# Patient Record
Sex: FEMALE | Race: WHITE | NOT HISPANIC OR LATINO | Employment: OTHER | URBAN - METROPOLITAN AREA
[De-identification: names, ages, dates, MRNs, and addresses within clinical notes are randomized per-mention and may not be internally consistent; named-entity substitution may affect disease eponyms.]

---

## 2020-07-04 ENCOUNTER — APPOINTMENT (OUTPATIENT)
Dept: CT IMAGING | Facility: HOSPITAL | Age: 80
End: 2020-07-04

## 2020-07-04 ENCOUNTER — HOSPITAL ENCOUNTER (INPATIENT)
Facility: HOSPITAL | Age: 80
LOS: 6 days | Discharge: NURSING, REHAB OR LONG-TERM CARE | End: 2020-07-10
Attending: INTERNAL MEDICINE | Admitting: INTERNAL MEDICINE

## 2020-07-04 DIAGNOSIS — Z78.9 IMPAIRED MOBILITY AND ADLS: ICD-10-CM

## 2020-07-04 DIAGNOSIS — Z74.09 IMPAIRED MOBILITY AND ADLS: ICD-10-CM

## 2020-07-04 DIAGNOSIS — F41.9 ANXIETY DISORDER, UNSPECIFIED TYPE: Primary | ICD-10-CM

## 2020-07-04 DIAGNOSIS — E11.40 TYPE 2 DIABETES MELLITUS WITH DIABETIC NEUROPATHY, WITH LONG-TERM CURRENT USE OF INSULIN (HCC): ICD-10-CM

## 2020-07-04 DIAGNOSIS — Z79.4 TYPE 2 DIABETES MELLITUS WITH DIABETIC NEUROPATHY, WITH LONG-TERM CURRENT USE OF INSULIN (HCC): ICD-10-CM

## 2020-07-04 PROBLEM — J96.01 ACUTE HYPOXEMIC RESPIRATORY FAILURE (HCC): Status: ACTIVE | Noted: 2020-07-04

## 2020-07-04 PROBLEM — J69.0 ASPIRATION PNEUMONIA OF BOTH LUNGS (HCC): Status: ACTIVE | Noted: 2020-07-04

## 2020-07-04 PROCEDURE — 70496 CT ANGIOGRAPHY HEAD: CPT

## 2020-07-04 PROCEDURE — 74176 CT ABD & PELVIS W/O CONTRAST: CPT

## 2020-07-04 PROCEDURE — 70498 CT ANGIOGRAPHY NECK: CPT

## 2020-07-04 PROCEDURE — 71250 CT THORAX DX C-: CPT

## 2020-07-04 PROCEDURE — 93005 ELECTROCARDIOGRAM TRACING: CPT | Performed by: INTERNAL MEDICINE

## 2020-07-04 PROCEDURE — 70450 CT HEAD/BRAIN W/O DYE: CPT

## 2020-07-04 PROCEDURE — 0042T HC CT CEREBRAL PERFUSION W/WO CONTRAST: CPT

## 2020-07-04 RX ORDER — SODIUM CHLORIDE 0.9 % (FLUSH) 0.9 %
10 SYRINGE (ML) INJECTION EVERY 12 HOURS SCHEDULED
Status: DISCONTINUED | OUTPATIENT
Start: 2020-07-05 | End: 2020-07-10 | Stop reason: HOSPADM

## 2020-07-04 RX ORDER — ASPIRIN 300 MG/1
300 SUPPOSITORY RECTAL DAILY
Status: DISCONTINUED | OUTPATIENT
Start: 2020-07-05 | End: 2020-07-07

## 2020-07-04 RX ORDER — ATORVASTATIN CALCIUM 40 MG/1
80 TABLET, FILM COATED ORAL NIGHTLY
Status: DISCONTINUED | OUTPATIENT
Start: 2020-07-05 | End: 2020-07-10 | Stop reason: HOSPADM

## 2020-07-04 RX ORDER — SODIUM CHLORIDE 0.9 % (FLUSH) 0.9 %
10 SYRINGE (ML) INJECTION AS NEEDED
Status: DISCONTINUED | OUTPATIENT
Start: 2020-07-04 | End: 2020-07-10 | Stop reason: HOSPADM

## 2020-07-04 RX ORDER — ASPIRIN 325 MG
325 TABLET ORAL DAILY
Status: DISCONTINUED | OUTPATIENT
Start: 2020-07-05 | End: 2020-07-07

## 2020-07-04 RX ADMIN — IOPAMIDOL 115 ML: 755 INJECTION, SOLUTION INTRAVENOUS at 23:50

## 2020-07-05 ENCOUNTER — APPOINTMENT (OUTPATIENT)
Dept: CARDIOLOGY | Facility: HOSPITAL | Age: 80
End: 2020-07-05

## 2020-07-05 ENCOUNTER — APPOINTMENT (OUTPATIENT)
Dept: MRI IMAGING | Facility: HOSPITAL | Age: 80
End: 2020-07-05

## 2020-07-05 PROBLEM — I25.10 CAD (CORONARY ARTERY DISEASE): Status: ACTIVE | Noted: 2020-07-05

## 2020-07-05 PROBLEM — I50.22 SYSTOLIC CHF, CHRONIC (HCC): Status: ACTIVE | Noted: 2020-07-05

## 2020-07-05 PROBLEM — E11.9 DM2 (DIABETES MELLITUS, TYPE 2) (HCC): Status: ACTIVE | Noted: 2020-07-05

## 2020-07-05 PROBLEM — N39.0 ACUTE UTI (URINARY TRACT INFECTION): Status: ACTIVE | Noted: 2020-07-05

## 2020-07-05 PROBLEM — R53.1 LEFT-SIDED WEAKNESS: Status: ACTIVE | Noted: 2020-07-05

## 2020-07-05 PROBLEM — I82.409 ACUTE DVT (DEEP VENOUS THROMBOSIS) (HCC): Status: ACTIVE | Noted: 2020-07-05

## 2020-07-05 PROBLEM — I63.9 ACUTE CVA (CEREBROVASCULAR ACCIDENT) (HCC): Status: ACTIVE | Noted: 2020-07-05

## 2020-07-05 PROBLEM — I10 HTN (HYPERTENSION): Status: ACTIVE | Noted: 2020-07-05

## 2020-07-05 LAB
ALBUMIN SERPL-MCNC: 3.2 G/DL (ref 3.5–5.2)
ALBUMIN SERPL-MCNC: 3.4 G/DL (ref 3.5–5.2)
ALBUMIN/GLOB SERPL: 1.1 G/DL
ALBUMIN/GLOB SERPL: 1.2 G/DL
ALP SERPL-CCNC: 75 U/L (ref 39–117)
ALP SERPL-CCNC: 81 U/L (ref 39–117)
ALT SERPL W P-5'-P-CCNC: 35 U/L (ref 1–33)
ALT SERPL W P-5'-P-CCNC: 37 U/L (ref 1–33)
ANION GAP SERPL CALCULATED.3IONS-SCNC: 14 MMOL/L (ref 5–15)
ANION GAP SERPL CALCULATED.3IONS-SCNC: 14 MMOL/L (ref 5–15)
APTT PPP: 31.1 SECONDS (ref 50–95)
APTT PPP: 69.7 SECONDS (ref 50–95)
AST SERPL-CCNC: 60 U/L (ref 1–32)
AST SERPL-CCNC: 61 U/L (ref 1–32)
BACTERIA UR QL AUTO: ABNORMAL /HPF
BASOPHILS # BLD AUTO: 0.03 10*3/MM3 (ref 0–0.2)
BASOPHILS # BLD AUTO: 0.03 10*3/MM3 (ref 0–0.2)
BASOPHILS NFR BLD AUTO: 0.2 % (ref 0–1.5)
BASOPHILS NFR BLD AUTO: 0.2 % (ref 0–1.5)
BH CV ECHO MEAS - AO MAX PG (FULL): 11.3 MMHG
BH CV ECHO MEAS - AO MAX PG: 15 MMHG
BH CV ECHO MEAS - AO MEAN PG (FULL): 6 MMHG
BH CV ECHO MEAS - AO MEAN PG: 8.2 MMHG
BH CV ECHO MEAS - AO ROOT AREA: 7.8 CM^2
BH CV ECHO MEAS - AO ROOT DIAM: 3.1 CM
BH CV ECHO MEAS - AO V2 MAX: 194.1 CM/SEC
BH CV ECHO MEAS - AO V2 MEAN: 131.5 CM/SEC
BH CV ECHO MEAS - AO V2 VTI: 48 CM
BH CV ECHO MEAS - ASC AORTA: 3.7 CM
BH CV ECHO MEAS - AVA(I,A): 1.6 CM^2
BH CV ECHO MEAS - AVA(I,D): 1.6 CM^2
BH CV ECHO MEAS - AVA(V,A): 1.5 CM^2
BH CV ECHO MEAS - AVA(V,D): 1.5 CM^2
BH CV ECHO MEAS - BSA(HAYCOCK): 2.3 M^2
BH CV ECHO MEAS - BSA: 2.1 M^2
BH CV ECHO MEAS - BZI_BMI: 43.6 KILOGRAMS/M^2
BH CV ECHO MEAS - BZI_METRIC_HEIGHT: 160 CM
BH CV ECHO MEAS - BZI_METRIC_WEIGHT: 111.6 KG
BH CV ECHO MEAS - EDV(CUBED): 87.1 ML
BH CV ECHO MEAS - EDV(MOD-SP2): 86.2 ML
BH CV ECHO MEAS - EDV(MOD-SP4): 111 ML
BH CV ECHO MEAS - EDV(TEICH): 89.2 ML
BH CV ECHO MEAS - EF(CUBED): 65.4 %
BH CV ECHO MEAS - EF(MOD-SP2): 51.6 %
BH CV ECHO MEAS - EF(MOD-SP4): 54.3 %
BH CV ECHO MEAS - EF(TEICH): 57.1 %
BH CV ECHO MEAS - EF{MOD-BP}: 54.1 %
BH CV ECHO MEAS - ESV(CUBED): 30.1 ML
BH CV ECHO MEAS - ESV(MOD-SP2): 41.7 ML
BH CV ECHO MEAS - ESV(MOD-SP4): 50.7 ML
BH CV ECHO MEAS - ESV(TEICH): 38.3 ML
BH CV ECHO MEAS - FS: 29.8 %
BH CV ECHO MEAS - IVS/LVPW: 0.92
BH CV ECHO MEAS - IVSD: 0.96 CM
BH CV ECHO MEAS - LA DIMENSION: 3.2 CM
BH CV ECHO MEAS - LA/AO: 1
BH CV ECHO MEAS - LAD MAJOR: 6.3 CM
BH CV ECHO MEAS - LV MASS(C)D: 148.9 GRAMS
BH CV ECHO MEAS - LV MAX PG: 3.7 MMHG
BH CV ECHO MEAS - LV MEAN PG: 2.2 MMHG
BH CV ECHO MEAS - LV V1 MAX: 96 CM/SEC
BH CV ECHO MEAS - LV V1 MEAN: 65.3 CM/SEC
BH CV ECHO MEAS - LV V1 VTI: 25 CM
BH CV ECHO MEAS - LVIDD: 4.4 CM
BH CV ECHO MEAS - LVIDS: 3.1 CM
BH CV ECHO MEAS - LVLD AP2: 7.5 CM
BH CV ECHO MEAS - LVLD AP4: 7.3 CM
BH CV ECHO MEAS - LVLS AP2: 5.7 CM
BH CV ECHO MEAS - LVLS AP4: 6.1 CM
BH CV ECHO MEAS - LVOT AREA (M): 3.1 CM^2
BH CV ECHO MEAS - LVOT AREA: 3 CM^2
BH CV ECHO MEAS - LVOT DIAM: 2 CM
BH CV ECHO MEAS - LVPWD: 1 CM
BH CV ECHO MEAS - MV A MAX VEL: 98.5 CM/SEC
BH CV ECHO MEAS - MV DEC SLOPE: 300.6 CM/SEC^2
BH CV ECHO MEAS - MV DEC TIME: 0.55 SEC
BH CV ECHO MEAS - MV E MAX VEL: 108 CM/SEC
BH CV ECHO MEAS - MV E/A: 1.1
BH CV ECHO MEAS - PA ACC TIME: 0.18 SEC
BH CV ECHO MEAS - PA MAX PG: 2.6 MMHG
BH CV ECHO MEAS - PA PR(ACCEL): -1 MMHG
BH CV ECHO MEAS - PA V2 MAX: 79.5 CM/SEC
BH CV ECHO MEAS - RAP SYSTOLE: 8 MMHG
BH CV ECHO MEAS - RVSP: 43 MMHG
BH CV ECHO MEAS - SV(AO): 373.8 ML
BH CV ECHO MEAS - SV(CUBED): 57 ML
BH CV ECHO MEAS - SV(LVOT): 76.2 ML
BH CV ECHO MEAS - SV(MOD-SP2): 44.5 ML
BH CV ECHO MEAS - SV(MOD-SP4): 60.3 ML
BH CV ECHO MEAS - SV(TEICH): 50.9 ML
BH CV ECHO MEAS - TAPSE (>1.6): 1.89 CM2
BH CV ECHO MEAS - TR MAX PG: 35 MMHG
BH CV ECHO MEAS - TR MAX VEL: 396 CM/SEC
BH CV ECHO MEAS - TV E MAX VEL: 55.3 CM/SEC
BH CV LOW VAS LEFT COMMON FEMORAL SPONT: 1
BH CV LOW VAS LEFT DISTAL FEMORAL SPONT: 1
BH CV LOW VAS LEFT GASTRONEMIUS VESSEL: 1
BH CV LOW VAS LEFT MID FEMORAL SPONT: 1
BH CV LOW VAS LEFT PERONEAL VESSEL: 1
BH CV LOW VAS LEFT POPLITEAL SPONT: 1
BH CV LOW VAS LEFT POSTERIOR TIBIAL VESSEL: 1
BH CV LOW VAS LEFT PROXIMAL FEMORAL SPONT: 1
BH CV LOWER VASCULAR LEFT COMMON FEMORAL AUGMENT: NORMAL
BH CV LOWER VASCULAR LEFT COMMON FEMORAL COMPRESS: NORMAL
BH CV LOWER VASCULAR LEFT COMMON FEMORAL PHASIC: NORMAL
BH CV LOWER VASCULAR LEFT COMMON FEMORAL SPONT: NORMAL
BH CV LOWER VASCULAR LEFT COMMON FEMORAL THROMBUS: NORMAL
BH CV LOWER VASCULAR LEFT DISTAL FEMORAL AUGMENT: NORMAL
BH CV LOWER VASCULAR LEFT DISTAL FEMORAL COMPRESS: NORMAL
BH CV LOWER VASCULAR LEFT DISTAL FEMORAL PHASIC: NORMAL
BH CV LOWER VASCULAR LEFT DISTAL FEMORAL SPONT: NORMAL
BH CV LOWER VASCULAR LEFT DISTAL FEMORAL THROMBUS: NORMAL
BH CV LOWER VASCULAR LEFT GASTRONEMIUS COMPRESS: NORMAL
BH CV LOWER VASCULAR LEFT GASTRONEMIUS THROMBUS: NORMAL
BH CV LOWER VASCULAR LEFT GREATER SAPH AK COMPRESS: NORMAL
BH CV LOWER VASCULAR LEFT GREATER SAPH BK COMPRESS: NORMAL
BH CV LOWER VASCULAR LEFT LESSER SAPH COMPRESS: NORMAL
BH CV LOWER VASCULAR LEFT MID FEMORAL AUGMENT: NORMAL
BH CV LOWER VASCULAR LEFT MID FEMORAL COMPETENT: NORMAL
BH CV LOWER VASCULAR LEFT MID FEMORAL COMPRESS: NORMAL
BH CV LOWER VASCULAR LEFT MID FEMORAL PHASIC: NORMAL
BH CV LOWER VASCULAR LEFT MID FEMORAL SPONT: NORMAL
BH CV LOWER VASCULAR LEFT MID FEMORAL THROMBUS: NORMAL
BH CV LOWER VASCULAR LEFT PERONEAL COMPRESS: NORMAL
BH CV LOWER VASCULAR LEFT PERONEAL THROMBUS: NORMAL
BH CV LOWER VASCULAR LEFT POPLITEAL AUGMENT: NORMAL
BH CV LOWER VASCULAR LEFT POPLITEAL COMPRESS: NORMAL
BH CV LOWER VASCULAR LEFT POPLITEAL PHASIC: NORMAL
BH CV LOWER VASCULAR LEFT POPLITEAL SPONT: NORMAL
BH CV LOWER VASCULAR LEFT POPLITEAL THROMBUS: NORMAL
BH CV LOWER VASCULAR LEFT POSTERIOR TIBIAL COMPRESS: NORMAL
BH CV LOWER VASCULAR LEFT POSTERIOR TIBIAL THROMBUS: NORMAL
BH CV LOWER VASCULAR LEFT PROFUNDA FEMORAL COMPRESS: NORMAL
BH CV LOWER VASCULAR LEFT PROXIMAL FEMORAL AUGMENT: NORMAL
BH CV LOWER VASCULAR LEFT PROXIMAL FEMORAL COMPRESS: NORMAL
BH CV LOWER VASCULAR LEFT PROXIMAL FEMORAL PHASIC: NORMAL
BH CV LOWER VASCULAR LEFT PROXIMAL FEMORAL SPONT: NORMAL
BH CV LOWER VASCULAR LEFT PROXIMAL FEMORAL THROMBUS: NORMAL
BH CV LOWER VASCULAR LEFT SAPHENOFEMORAL JUNCTION AUGMENT: NORMAL
BH CV LOWER VASCULAR LEFT SAPHENOFEMORAL JUNCTION COMPRESS: NORMAL
BH CV LOWER VASCULAR LEFT SAPHENOFEMORAL JUNCTION PHASIC: NORMAL
BH CV LOWER VASCULAR LEFT SAPHENOFEMORAL JUNCTION SPONT: NORMAL
BH CV LOWER VASCULAR RIGHT COMMON FEMORAL AUGMENT: NORMAL
BH CV LOWER VASCULAR RIGHT COMMON FEMORAL COMPRESS: NORMAL
BH CV LOWER VASCULAR RIGHT COMMON FEMORAL PHASIC: NORMAL
BH CV LOWER VASCULAR RIGHT COMMON FEMORAL SPONT: NORMAL
BH CV LOWER VASCULAR RIGHT DISTAL FEMORAL COMPRESS: NORMAL
BH CV LOWER VASCULAR RIGHT GASTRONEMIUS COMPRESS: NORMAL
BH CV LOWER VASCULAR RIGHT GREATER SAPH AK COMPRESS: NORMAL
BH CV LOWER VASCULAR RIGHT GREATER SAPH BK COMPRESS: NORMAL
BH CV LOWER VASCULAR RIGHT LESSER SAPH COMPRESS: NORMAL
BH CV LOWER VASCULAR RIGHT MID FEMORAL AUGMENT: NORMAL
BH CV LOWER VASCULAR RIGHT MID FEMORAL COMPRESS: NORMAL
BH CV LOWER VASCULAR RIGHT MID FEMORAL PHASIC: NORMAL
BH CV LOWER VASCULAR RIGHT MID FEMORAL SPONT: NORMAL
BH CV LOWER VASCULAR RIGHT PERONEAL COMPRESS: NORMAL
BH CV LOWER VASCULAR RIGHT POPLITEAL AUGMENT: NORMAL
BH CV LOWER VASCULAR RIGHT POPLITEAL COMPRESS: NORMAL
BH CV LOWER VASCULAR RIGHT POPLITEAL PHASIC: NORMAL
BH CV LOWER VASCULAR RIGHT POPLITEAL SPONT: NORMAL
BH CV LOWER VASCULAR RIGHT POSTERIOR TIBIAL COMPRESS: NORMAL
BH CV LOWER VASCULAR RIGHT PROFUNDA FEMORAL COMPRESS: NORMAL
BH CV LOWER VASCULAR RIGHT PROXIMAL FEMORAL COMPRESS: NORMAL
BH CV LOWER VASCULAR RIGHT SAPHENOFEMORAL JUNCTION AUGMENT: NORMAL
BH CV LOWER VASCULAR RIGHT SAPHENOFEMORAL JUNCTION COMPRESS: NORMAL
BH CV LOWER VASCULAR RIGHT SAPHENOFEMORAL JUNCTION PHASIC: NORMAL
BH CV LOWER VASCULAR RIGHT SAPHENOFEMORAL JUNCTION SPONT: NORMAL
BH CV LOWER VASCULAR RIGHT SOLEAL COMPRESS: NORMAL
BH CV XLRA - RV BASE: 3.9 CM
BH CV XLRA - RV LENGTH: 8.2 CM
BH CV XLRA - RV MID: 3.9 CM
BH CV XLRA - TDI S': 12.6 CM/SEC
BILIRUB SERPL-MCNC: 0.3 MG/DL (ref 0.2–1.2)
BILIRUB SERPL-MCNC: 0.3 MG/DL (ref 0.2–1.2)
BILIRUB UR QL STRIP: NEGATIVE
BUN SERPL-MCNC: 22 MG/DL (ref 8–23)
BUN SERPL-MCNC: 23 MG/DL (ref 8–23)
BUN/CREAT SERPL: 18.5 (ref 7–25)
BUN/CREAT SERPL: 20 (ref 7–25)
CALCIUM SPEC-SCNC: 8.8 MG/DL (ref 8.6–10.5)
CALCIUM SPEC-SCNC: 8.8 MG/DL (ref 8.6–10.5)
CHLORIDE SERPL-SCNC: 101 MMOL/L (ref 98–107)
CHLORIDE SERPL-SCNC: 99 MMOL/L (ref 98–107)
CHOLEST SERPL-MCNC: 312 MG/DL (ref 0–200)
CLARITY UR: CLEAR
CO2 SERPL-SCNC: 23 MMOL/L (ref 22–29)
CO2 SERPL-SCNC: 25 MMOL/L (ref 22–29)
COLOR UR: YELLOW
CREAT SERPL-MCNC: 1.1 MG/DL (ref 0.57–1)
CREAT SERPL-MCNC: 1.24 MG/DL (ref 0.57–1)
D DIMER PPP FEU-MCNC: 11.18 MCGFEU/ML (ref 0–0.56)
D-LACTATE SERPL-SCNC: 2.4 MMOL/L (ref 0.5–2)
D-LACTATE SERPL-SCNC: 2.4 MMOL/L (ref 0.5–2)
DEPRECATED RDW RBC AUTO: 44.1 FL (ref 37–54)
DEPRECATED RDW RBC AUTO: 45.8 FL (ref 37–54)
EOSINOPHIL # BLD AUTO: 0.01 10*3/MM3 (ref 0–0.4)
EOSINOPHIL # BLD AUTO: 0.03 10*3/MM3 (ref 0–0.4)
EOSINOPHIL NFR BLD AUTO: 0.1 % (ref 0.3–6.2)
EOSINOPHIL NFR BLD AUTO: 0.2 % (ref 0.3–6.2)
ERYTHROCYTE [DISTWIDTH] IN BLOOD BY AUTOMATED COUNT: 13 % (ref 12.3–15.4)
ERYTHROCYTE [DISTWIDTH] IN BLOOD BY AUTOMATED COUNT: 13.2 % (ref 12.3–15.4)
GFR SERPL CREATININE-BSD FRML MDRD: 42 ML/MIN/1.73
GFR SERPL CREATININE-BSD FRML MDRD: 48 ML/MIN/1.73
GLOBULIN UR ELPH-MCNC: 2.7 GM/DL
GLOBULIN UR ELPH-MCNC: 3.1 GM/DL
GLUCOSE BLDC GLUCOMTR-MCNC: 197 MG/DL (ref 70–130)
GLUCOSE BLDC GLUCOMTR-MCNC: 285 MG/DL (ref 70–130)
GLUCOSE BLDC GLUCOMTR-MCNC: 290 MG/DL (ref 70–130)
GLUCOSE BLDC GLUCOMTR-MCNC: 329 MG/DL (ref 70–130)
GLUCOSE SERPL-MCNC: 284 MG/DL (ref 65–99)
GLUCOSE SERPL-MCNC: 334 MG/DL (ref 65–99)
GLUCOSE UR STRIP-MCNC: ABNORMAL MG/DL
HBA1C MFR BLD: 8.1 % (ref 4.8–5.6)
HBA1C MFR BLD: 8.2 % (ref 4.8–5.6)
HCT VFR BLD AUTO: 35.4 % (ref 34–46.6)
HCT VFR BLD AUTO: 37.7 % (ref 34–46.6)
HDLC SERPL-MCNC: 44 MG/DL (ref 40–60)
HGB BLD-MCNC: 11.4 G/DL (ref 12–15.9)
HGB BLD-MCNC: 12.3 G/DL (ref 12–15.9)
HGB UR QL STRIP.AUTO: ABNORMAL
HYALINE CASTS UR QL AUTO: ABNORMAL /LPF
IMM GRANULOCYTES # BLD AUTO: 0.07 10*3/MM3 (ref 0–0.05)
IMM GRANULOCYTES # BLD AUTO: 0.11 10*3/MM3 (ref 0–0.05)
IMM GRANULOCYTES NFR BLD AUTO: 0.5 % (ref 0–0.5)
IMM GRANULOCYTES NFR BLD AUTO: 0.7 % (ref 0–0.5)
INR PPP: 1.12 (ref 0.85–1.16)
INR PPP: 1.16 (ref 0.85–1.16)
KETONES UR QL STRIP: NEGATIVE
LACTATE HOLD SPECIMEN: NORMAL
LDH SERPL-CCNC: 292 U/L (ref 135–214)
LDLC SERPL CALC-MCNC: 233 MG/DL (ref 0–100)
LDLC/HDLC SERPL: 5.29 {RATIO}
LEUKOCYTE ESTERASE UR QL STRIP.AUTO: ABNORMAL
LIPASE SERPL-CCNC: 18 U/L (ref 13–60)
LYMPHOCYTES # BLD AUTO: 2.12 10*3/MM3 (ref 0.7–3.1)
LYMPHOCYTES # BLD AUTO: 2.48 10*3/MM3 (ref 0.7–3.1)
LYMPHOCYTES NFR BLD AUTO: 13.7 % (ref 19.6–45.3)
LYMPHOCYTES NFR BLD AUTO: 19.3 % (ref 19.6–45.3)
MAXIMAL PREDICTED HEART RATE: 140 BPM
MCH RBC QN AUTO: 30.4 PG (ref 26.6–33)
MCH RBC QN AUTO: 30.6 PG (ref 26.6–33)
MCHC RBC AUTO-ENTMCNC: 32.2 G/DL (ref 31.5–35.7)
MCHC RBC AUTO-ENTMCNC: 32.6 G/DL (ref 31.5–35.7)
MCV RBC AUTO: 93.1 FL (ref 79–97)
MCV RBC AUTO: 94.9 FL (ref 79–97)
MONOCYTES # BLD AUTO: 0.92 10*3/MM3 (ref 0.1–0.9)
MONOCYTES # BLD AUTO: 1.1 10*3/MM3 (ref 0.1–0.9)
MONOCYTES NFR BLD AUTO: 7.1 % (ref 5–12)
MONOCYTES NFR BLD AUTO: 7.1 % (ref 5–12)
NEUTROPHILS NFR BLD AUTO: 12.04 10*3/MM3 (ref 1.7–7)
NEUTROPHILS NFR BLD AUTO: 72.8 % (ref 42.7–76)
NEUTROPHILS NFR BLD AUTO: 78.1 % (ref 42.7–76)
NEUTROPHILS NFR BLD AUTO: 9.36 10*3/MM3 (ref 1.7–7)
NITRITE UR QL STRIP: POSITIVE
NRBC BLD AUTO-RTO: 0 /100 WBC (ref 0–0.2)
NRBC BLD AUTO-RTO: 0 /100 WBC (ref 0–0.2)
PH UR STRIP.AUTO: <=5 [PH] (ref 5–8)
PLAT MORPH BLD: NORMAL
PLATELET # BLD AUTO: 138 10*3/MM3 (ref 140–450)
PLATELET # BLD AUTO: 144 10*3/MM3 (ref 140–450)
PMV BLD AUTO: 11.3 FL (ref 6–12)
PMV BLD AUTO: 11.5 FL (ref 6–12)
POTASSIUM SERPL-SCNC: 3.2 MMOL/L (ref 3.5–5.2)
POTASSIUM SERPL-SCNC: 3.5 MMOL/L (ref 3.5–5.2)
PROCALCITONIN SERPL-MCNC: 7.52 NG/ML (ref 0.1–0.25)
PROT SERPL-MCNC: 5.9 G/DL (ref 6–8.5)
PROT SERPL-MCNC: 6.5 G/DL (ref 6–8.5)
PROT UR QL STRIP: ABNORMAL
PROTHROMBIN TIME: 14.1 SECONDS (ref 11.5–14)
PROTHROMBIN TIME: 14.5 SECONDS (ref 11.5–14)
RBC # BLD AUTO: 3.73 10*6/MM3 (ref 3.77–5.28)
RBC # BLD AUTO: 4.05 10*6/MM3 (ref 3.77–5.28)
RBC # UR: ABNORMAL /HPF
RBC MORPH BLD: NORMAL
REF LAB TEST METHOD: ABNORMAL
SARS-COV-2 RNA RESP QL NAA+PROBE: NOT DETECTED
SODIUM SERPL-SCNC: 138 MMOL/L (ref 136–145)
SODIUM SERPL-SCNC: 138 MMOL/L (ref 136–145)
SP GR UR STRIP: 1.05 (ref 1–1.03)
SQUAMOUS #/AREA URNS HPF: ABNORMAL /HPF
STRESS TARGET HR: 119 BPM
TRIGL SERPL-MCNC: 176 MG/DL (ref 0–150)
TROPONIN T SERPL-MCNC: 0.41 NG/ML (ref 0–0.03)
TROPONIN T SERPL-MCNC: 0.45 NG/ML (ref 0–0.03)
TSH SERPL DL<=0.05 MIU/L-ACNC: 0.64 UIU/ML (ref 0.27–4.2)
UFH PPP CHRO-ACNC: 0.1 IU/ML (ref 0.3–0.7)
UFH PPP CHRO-ACNC: 0.26 IU/ML (ref 0.3–0.7)
UFH PPP CHRO-ACNC: 0.39 IU/ML (ref 0.3–0.7)
UROBILINOGEN UR QL STRIP: ABNORMAL
VLDLC SERPL-MCNC: 35.2 MG/DL
WBC # BLD AUTO: 12.87 10*3/MM3 (ref 3.4–10.8)
WBC # BLD AUTO: 15.43 10*3/MM3 (ref 3.4–10.8)
WBC MORPH BLD: NORMAL
WBC UR QL AUTO: ABNORMAL /HPF

## 2020-07-05 PROCEDURE — 80053 COMPREHEN METABOLIC PANEL: CPT | Performed by: INTERNAL MEDICINE

## 2020-07-05 PROCEDURE — 85007 BL SMEAR W/DIFF WBC COUNT: CPT | Performed by: INTERNAL MEDICINE

## 2020-07-05 PROCEDURE — 85379 FIBRIN DEGRADATION QUANT: CPT | Performed by: INTERNAL MEDICINE

## 2020-07-05 PROCEDURE — C9803 HOPD COVID-19 SPEC COLLECT: HCPCS | Performed by: INTERNAL MEDICINE

## 2020-07-05 PROCEDURE — 70551 MRI BRAIN STEM W/O DYE: CPT

## 2020-07-05 PROCEDURE — 25010000002 CEFTRIAXONE: Performed by: INTERNAL MEDICINE

## 2020-07-05 PROCEDURE — 85025 COMPLETE CBC W/AUTO DIFF WBC: CPT | Performed by: INTERNAL MEDICINE

## 2020-07-05 PROCEDURE — 92523 SPEECH SOUND LANG COMPREHEN: CPT

## 2020-07-05 PROCEDURE — 99222 1ST HOSP IP/OBS MODERATE 55: CPT | Performed by: PSYCHIATRY & NEUROLOGY

## 2020-07-05 PROCEDURE — 0 IOPAMIDOL PER 1 ML: Performed by: INTERNAL MEDICINE

## 2020-07-05 PROCEDURE — 83036 HEMOGLOBIN GLYCOSYLATED A1C: CPT | Performed by: INTERNAL MEDICINE

## 2020-07-05 PROCEDURE — 80061 LIPID PANEL: CPT | Performed by: INTERNAL MEDICINE

## 2020-07-05 PROCEDURE — 85520 HEPARIN ASSAY: CPT | Performed by: INTERNAL MEDICINE

## 2020-07-05 PROCEDURE — 25010000002 HEPARIN (PORCINE) PER 1000 UNITS

## 2020-07-05 PROCEDURE — 93010 ELECTROCARDIOGRAM REPORT: CPT | Performed by: INTERNAL MEDICINE

## 2020-07-05 PROCEDURE — 85730 THROMBOPLASTIN TIME PARTIAL: CPT | Performed by: INTERNAL MEDICINE

## 2020-07-05 PROCEDURE — 82962 GLUCOSE BLOOD TEST: CPT

## 2020-07-05 PROCEDURE — 87086 URINE CULTURE/COLONY COUNT: CPT | Performed by: INTERNAL MEDICINE

## 2020-07-05 PROCEDURE — 85520 HEPARIN ASSAY: CPT

## 2020-07-05 PROCEDURE — 84443 ASSAY THYROID STIM HORMONE: CPT | Performed by: INTERNAL MEDICINE

## 2020-07-05 PROCEDURE — 93970 EXTREMITY STUDY: CPT

## 2020-07-05 PROCEDURE — 25010000002 HEPARIN (PORCINE) 25000-0.45 UT/250ML-% SOLUTION: Performed by: INTERNAL MEDICINE

## 2020-07-05 PROCEDURE — 81001 URINALYSIS AUTO W/SCOPE: CPT | Performed by: INTERNAL MEDICINE

## 2020-07-05 PROCEDURE — 84484 ASSAY OF TROPONIN QUANT: CPT | Performed by: INTERNAL MEDICINE

## 2020-07-05 PROCEDURE — 93306 TTE W/DOPPLER COMPLETE: CPT

## 2020-07-05 PROCEDURE — 83605 ASSAY OF LACTIC ACID: CPT | Performed by: INTERNAL MEDICINE

## 2020-07-05 PROCEDURE — 99223 1ST HOSP IP/OBS HIGH 75: CPT | Performed by: INTERNAL MEDICINE

## 2020-07-05 PROCEDURE — 93970 EXTREMITY STUDY: CPT | Performed by: INTERNAL MEDICINE

## 2020-07-05 PROCEDURE — 85610 PROTHROMBIN TIME: CPT | Performed by: INTERNAL MEDICINE

## 2020-07-05 PROCEDURE — 25010000002 CEFTRIAXONE PER 250 MG: Performed by: INTERNAL MEDICINE

## 2020-07-05 PROCEDURE — 83690 ASSAY OF LIPASE: CPT | Performed by: INTERNAL MEDICINE

## 2020-07-05 PROCEDURE — 83615 LACTATE (LD) (LDH) ENZYME: CPT | Performed by: INTERNAL MEDICINE

## 2020-07-05 PROCEDURE — 93306 TTE W/DOPPLER COMPLETE: CPT | Performed by: INTERNAL MEDICINE

## 2020-07-05 PROCEDURE — 87635 SARS-COV-2 COVID-19 AMP PRB: CPT | Performed by: INTERNAL MEDICINE

## 2020-07-05 PROCEDURE — 63710000001 INSULIN LISPRO (HUMAN) PER 5 UNITS: Performed by: INTERNAL MEDICINE

## 2020-07-05 PROCEDURE — 84145 PROCALCITONIN (PCT): CPT | Performed by: INTERNAL MEDICINE

## 2020-07-05 RX ORDER — ERGOCALCIFEROL 1.25 MG/1
50000 CAPSULE ORAL WEEKLY
COMMUNITY

## 2020-07-05 RX ORDER — FERROUS SULFATE 325(65) MG
325 TABLET ORAL
Status: DISCONTINUED | OUTPATIENT
Start: 2020-07-05 | End: 2020-07-10 | Stop reason: HOSPADM

## 2020-07-05 RX ORDER — ACETAMINOPHEN 650 MG/1
650 SUPPOSITORY RECTAL EVERY 4 HOURS PRN
Status: DISCONTINUED | OUTPATIENT
Start: 2020-07-05 | End: 2020-07-10 | Stop reason: HOSPADM

## 2020-07-05 RX ORDER — CYCLOBENZAPRINE HCL 10 MG
10 TABLET ORAL 3 TIMES DAILY PRN
Status: ON HOLD | COMMUNITY
End: 2020-07-06

## 2020-07-05 RX ORDER — FAMOTIDINE 20 MG/1
40 TABLET, FILM COATED ORAL DAILY
Status: DISCONTINUED | OUTPATIENT
Start: 2020-07-05 | End: 2020-07-10 | Stop reason: HOSPADM

## 2020-07-05 RX ORDER — AMITRIPTYLINE HYDROCHLORIDE 10 MG/1
10 TABLET, FILM COATED ORAL NIGHTLY
COMMUNITY

## 2020-07-05 RX ORDER — SODIUM CHLORIDE 0.9 % (FLUSH) 0.9 %
10 SYRINGE (ML) INJECTION AS NEEDED
Status: DISCONTINUED | OUTPATIENT
Start: 2020-07-05 | End: 2020-07-10 | Stop reason: HOSPADM

## 2020-07-05 RX ORDER — LEVOTHYROXINE SODIUM 0.05 MG/1
50 TABLET ORAL DAILY
COMMUNITY

## 2020-07-05 RX ORDER — NICOTINE POLACRILEX 4 MG
15 LOZENGE BUCCAL
Status: DISCONTINUED | OUTPATIENT
Start: 2020-07-05 | End: 2020-07-10 | Stop reason: HOSPADM

## 2020-07-05 RX ORDER — PRAMIPEXOLE DIHYDROCHLORIDE 0.5 MG/1
0.5 TABLET ORAL NIGHTLY
Status: ON HOLD | COMMUNITY
End: 2020-07-06

## 2020-07-05 RX ORDER — CARVEDILOL 12.5 MG/1
12.5 TABLET ORAL 2 TIMES DAILY WITH MEALS
Status: DISCONTINUED | OUTPATIENT
Start: 2020-07-05 | End: 2020-07-10 | Stop reason: HOSPADM

## 2020-07-05 RX ORDER — ONDANSETRON 4 MG/1
4 TABLET, FILM COATED ORAL EVERY 6 HOURS PRN
Status: DISCONTINUED | OUTPATIENT
Start: 2020-07-05 | End: 2020-07-10 | Stop reason: HOSPADM

## 2020-07-05 RX ORDER — PREGABALIN 100 MG/1
300 CAPSULE ORAL 2 TIMES DAILY
COMMUNITY
End: 2020-07-10 | Stop reason: HOSPADM

## 2020-07-05 RX ORDER — CARBAMAZEPINE 100 MG/1
100 TABLET, EXTENDED RELEASE ORAL NIGHTLY
COMMUNITY
End: 2020-07-10 | Stop reason: HOSPADM

## 2020-07-05 RX ORDER — IPRATROPIUM BROMIDE AND ALBUTEROL SULFATE 2.5; .5 MG/3ML; MG/3ML
3 SOLUTION RESPIRATORY (INHALATION) EVERY 4 HOURS PRN
COMMUNITY

## 2020-07-05 RX ORDER — HYDROCODONE BITARTRATE AND ACETAMINOPHEN 5; 325 MG/1; MG/1
1 TABLET ORAL EVERY 8 HOURS PRN
Status: DISCONTINUED | OUTPATIENT
Start: 2020-07-05 | End: 2020-07-06

## 2020-07-05 RX ORDER — FUROSEMIDE 40 MG/1
40 TABLET ORAL DAILY
COMMUNITY

## 2020-07-05 RX ORDER — DULOXETIN HYDROCHLORIDE 60 MG/1
60 CAPSULE, DELAYED RELEASE ORAL NIGHTLY
COMMUNITY

## 2020-07-05 RX ORDER — OMEPRAZOLE 20 MG/1
20 CAPSULE, DELAYED RELEASE ORAL DAILY
COMMUNITY

## 2020-07-05 RX ORDER — HEPARIN SODIUM 1000 [USP'U]/ML
2500 INJECTION, SOLUTION INTRAVENOUS; SUBCUTANEOUS ONCE
Status: COMPLETED | OUTPATIENT
Start: 2020-07-05 | End: 2020-07-05

## 2020-07-05 RX ORDER — HYDROCODONE BITARTRATE AND ACETAMINOPHEN 10; 325 MG/1; MG/1
1 TABLET ORAL 3 TIMES DAILY
COMMUNITY

## 2020-07-05 RX ORDER — SODIUM CHLORIDE 0.9 % (FLUSH) 0.9 %
10 SYRINGE (ML) INJECTION EVERY 12 HOURS SCHEDULED
Status: DISCONTINUED | OUTPATIENT
Start: 2020-07-05 | End: 2020-07-10 | Stop reason: HOSPADM

## 2020-07-05 RX ORDER — DOCUSATE SODIUM 100 MG/1
100 CAPSULE, LIQUID FILLED ORAL 2 TIMES DAILY PRN
Status: DISCONTINUED | OUTPATIENT
Start: 2020-07-05 | End: 2020-07-10 | Stop reason: HOSPADM

## 2020-07-05 RX ORDER — HEPARIN SODIUM 1000 [USP'U]/ML
40 INJECTION, SOLUTION INTRAVENOUS; SUBCUTANEOUS AS NEEDED
Status: DISCONTINUED | OUTPATIENT
Start: 2020-07-05 | End: 2020-07-05

## 2020-07-05 RX ORDER — CARVEDILOL 12.5 MG/1
12.5 TABLET ORAL 2 TIMES DAILY WITH MEALS
Status: ON HOLD | COMMUNITY
End: 2020-07-06

## 2020-07-05 RX ORDER — FLUTICASONE PROPIONATE 50 MCG
2 SPRAY, SUSPENSION (ML) NASAL DAILY
Status: DISCONTINUED | OUTPATIENT
Start: 2020-07-05 | End: 2020-07-10 | Stop reason: HOSPADM

## 2020-07-05 RX ORDER — ACETAMINOPHEN 325 MG/1
650 TABLET ORAL EVERY 4 HOURS PRN
Status: DISCONTINUED | OUTPATIENT
Start: 2020-07-05 | End: 2020-07-10 | Stop reason: HOSPADM

## 2020-07-05 RX ORDER — ICOSAPENT ETHYL 1000 MG/1
1 CAPSULE ORAL 2 TIMES DAILY WITH MEALS
COMMUNITY

## 2020-07-05 RX ORDER — ASPIRIN 81 MG/1
81 TABLET ORAL DAILY
COMMUNITY

## 2020-07-05 RX ORDER — DONEPEZIL HYDROCHLORIDE 10 MG/1
10 TABLET, FILM COATED ORAL NIGHTLY
COMMUNITY

## 2020-07-05 RX ORDER — ONDANSETRON 2 MG/ML
4 INJECTION INTRAMUSCULAR; INTRAVENOUS EVERY 6 HOURS PRN
Status: DISCONTINUED | OUTPATIENT
Start: 2020-07-05 | End: 2020-07-10 | Stop reason: HOSPADM

## 2020-07-05 RX ORDER — HEPARIN SODIUM 10000 [USP'U]/100ML
13 INJECTION, SOLUTION INTRAVENOUS
Status: DISCONTINUED | OUTPATIENT
Start: 2020-07-05 | End: 2020-07-06

## 2020-07-05 RX ORDER — ACETAMINOPHEN 160 MG/5ML
650 SOLUTION ORAL EVERY 4 HOURS PRN
Status: DISCONTINUED | OUTPATIENT
Start: 2020-07-05 | End: 2020-07-10 | Stop reason: HOSPADM

## 2020-07-05 RX ORDER — LEVOTHYROXINE SODIUM 0.05 MG/1
50 TABLET ORAL DAILY
Status: DISCONTINUED | OUTPATIENT
Start: 2020-07-05 | End: 2020-07-10 | Stop reason: HOSPADM

## 2020-07-05 RX ORDER — PROPRANOLOL HYDROCHLORIDE 40 MG/1
40 TABLET ORAL 2 TIMES DAILY
COMMUNITY

## 2020-07-05 RX ORDER — LOSARTAN POTASSIUM 50 MG/1
50 TABLET ORAL 2 TIMES DAILY
Status: ON HOLD | COMMUNITY
End: 2020-07-10 | Stop reason: SDUPTHER

## 2020-07-05 RX ORDER — HEPARIN SODIUM 1000 [USP'U]/ML
80 INJECTION, SOLUTION INTRAVENOUS; SUBCUTANEOUS AS NEEDED
Status: DISCONTINUED | OUTPATIENT
Start: 2020-07-05 | End: 2020-07-05

## 2020-07-05 RX ORDER — SODIUM CHLORIDE 9 MG/ML
50 INJECTION, SOLUTION INTRAVENOUS CONTINUOUS
Status: DISCONTINUED | OUTPATIENT
Start: 2020-07-05 | End: 2020-07-05

## 2020-07-05 RX ORDER — BUMETANIDE 2 MG/1
2 TABLET ORAL DAILY
COMMUNITY
End: 2020-07-10 | Stop reason: HOSPADM

## 2020-07-05 RX ORDER — IPRATROPIUM BROMIDE AND ALBUTEROL SULFATE 2.5; .5 MG/3ML; MG/3ML
3 SOLUTION RESPIRATORY (INHALATION) EVERY 4 HOURS PRN
Status: DISCONTINUED | OUTPATIENT
Start: 2020-07-05 | End: 2020-07-10 | Stop reason: HOSPADM

## 2020-07-05 RX ORDER — DEXTROSE MONOHYDRATE 25 G/50ML
25 INJECTION, SOLUTION INTRAVENOUS
Status: DISCONTINUED | OUTPATIENT
Start: 2020-07-05 | End: 2020-07-10 | Stop reason: HOSPADM

## 2020-07-05 RX ORDER — ISOSORBIDE MONONITRATE 60 MG/1
60 TABLET, EXTENDED RELEASE ORAL DAILY
COMMUNITY
End: 2020-07-10 | Stop reason: HOSPADM

## 2020-07-05 RX ORDER — FERROUS SULFATE 325(65) MG
325 TABLET ORAL
COMMUNITY

## 2020-07-05 RX ORDER — LORAZEPAM 1 MG/1
1 TABLET ORAL NIGHTLY PRN
Status: ON HOLD | COMMUNITY
End: 2020-07-10 | Stop reason: SDUPTHER

## 2020-07-05 RX ADMIN — HYDROCODONE BITARTRATE AND ACETAMINOPHEN 1 TABLET: 5; 325 TABLET ORAL at 13:30

## 2020-07-05 RX ADMIN — SODIUM CHLORIDE, PRESERVATIVE FREE 10 ML: 5 INJECTION INTRAVENOUS at 20:18

## 2020-07-05 RX ADMIN — CEFTRIAXONE SODIUM 1 G: 1 INJECTION, POWDER, FOR SOLUTION INTRAMUSCULAR; INTRAVENOUS at 20:14

## 2020-07-05 RX ADMIN — ASPIRIN 325 MG ORAL TABLET 325 MG: 325 PILL ORAL at 08:14

## 2020-07-05 RX ADMIN — LEVOTHYROXINE SODIUM 50 MCG: 50 TABLET ORAL at 06:18

## 2020-07-05 RX ADMIN — HEPARIN SODIUM 13.4 UNITS/KG/HR: 10000 INJECTION, SOLUTION INTRAVENOUS at 10:59

## 2020-07-05 RX ADMIN — SODIUM CHLORIDE 500 ML: 9 INJECTION, SOLUTION INTRAVENOUS at 03:30

## 2020-07-05 RX ADMIN — HEPARIN SODIUM 2500 UNITS: 1000 INJECTION, SOLUTION INTRAVENOUS; SUBCUTANEOUS at 19:45

## 2020-07-05 RX ADMIN — INSULIN LISPRO 4 UNITS: 100 INJECTION, SOLUTION INTRAVENOUS; SUBCUTANEOUS at 12:33

## 2020-07-05 RX ADMIN — FAMOTIDINE 40 MG: 20 TABLET, FILM COATED ORAL at 08:14

## 2020-07-05 RX ADMIN — CEFTRIAXONE SODIUM 1 G: 1 INJECTION, POWDER, FOR SOLUTION INTRAMUSCULAR; INTRAVENOUS at 03:30

## 2020-07-05 RX ADMIN — HYDROCODONE BITARTRATE AND ACETAMINOPHEN 1 TABLET: 5; 325 TABLET ORAL at 05:38

## 2020-07-05 RX ADMIN — SODIUM CHLORIDE 50 ML/HR: 9 INJECTION, SOLUTION INTRAVENOUS at 06:14

## 2020-07-05 RX ADMIN — INSULIN LISPRO 4 UNITS: 100 INJECTION, SOLUTION INTRAVENOUS; SUBCUTANEOUS at 08:17

## 2020-07-05 RX ADMIN — ATORVASTATIN CALCIUM 80 MG: 40 TABLET, FILM COATED ORAL at 00:59

## 2020-07-05 RX ADMIN — ATORVASTATIN CALCIUM 80 MG: 40 TABLET, FILM COATED ORAL at 20:14

## 2020-07-05 RX ADMIN — INSULIN LISPRO 5 UNITS: 100 INJECTION, SOLUTION INTRAVENOUS; SUBCUTANEOUS at 03:30

## 2020-07-05 RX ADMIN — ACETAMINOPHEN 650 MG: 650 SUPPOSITORY RECTAL at 00:58

## 2020-07-05 RX ADMIN — FERROUS SULFATE TAB 325 MG (65 MG ELEMENTAL FE) 325 MG: 325 (65 FE) TAB at 08:14

## 2020-07-05 RX ADMIN — HYDROCODONE BITARTRATE AND ACETAMINOPHEN 1 TABLET: 5; 325 TABLET ORAL at 20:28

## 2020-07-05 NOTE — NURSING NOTE
ACC REVIEW REPORT: [facility]        PATIENT NAME: Steffen Zeng    PATIENT ID: [id number]      COVID-19 ACC SCREENING       DOES THE PATIENT HAVE A FEVER GREATER THAN OR EQUAL .4: no    IS THE PATIENT EXPERIENCING SHORTNESS OF BREATH: no    DOES THE PATIENT HAVE A COUGH: no    DOES THE PATIENT HAVE ANY OF THE FOLLOWING RISK FACTORS:    EXPOSURE TO SUSPECTED OR KNOWN COVID-19: no    RECENT TRAVEL HISTORY TO ENDEMIC AREA (DOMESTIC/LOCAL): no    IS THE PATIENT A HEALTHCARE WORKER: no    HAS THE PATIENT BEEN TESTED FOR COVID-19: yes    DATE TESTED: 7/4/2020    LAB TESTING SENT TO: in house and it was negative      BED: S321    BED TYPE: TELEMETRY    BED GIVEN TO: LAILA ISAACS RN    TIME BED GIVEN: 2234    YOB: 1940    AGE: 79    GENDER: FEMALE    PREVIOUS ADMIT TO [facility]: NO    PREVIOUS ADMISSION DATE: N/A    PATIENT CLASS: OBSERVATION    TODAY'S DATE: 7/4/2020    TRANSFER DATE: 7/4/2020    ETA: 2310    TRANSFERRING FACILITY: [facility]    TRANSFERRING FACILITY PHONE # : [phone]    TRANSFERRING MD: ALICIA    ACCEPTING PROVIDER: NAVIGATOR    NEUROLOGY PHYSICIAN: YULISA    DATE/TIME REQUEST RECEIVED: 7/4/2020 2106    [facility] RN: EULALIA CISNEROS RN    REPORT FROM: LAILA ISAACS RN    TIME REPORT TAKEN: 2230    DIAGNOSIS: CVA    REASON FOR TRANSFER TO [facility]: HIGHER LEVEL OF CARE    TRANSPORTATION: HELICOPTER    CLINICAL REASON FOR TRANSFER TO [facility]: LAST KNOWN WELL 830 AM DAUGHTER HAD FOUND HER SLUMPED OVER ON TOILET AROUND 5PM  SHE ARRIVED AT ER 1927.      CLINICAL INFORMATION    HEIGHT: 62 INCHES    WEIGHT: 252.4 LBS    ALLERGIES: SULFA, CODEINE    WYATT: NO    INFECTIOUS DISEASE: NO    ISOLATION: NO    LAST VITAL SIGNS:  TIME: 2230  TEMP: 99.8  PULSE: 98  B/P: 115/59  RESP: 18    LAB INFORMATION: wbc 13.94, PTT 22.2, BLOOD GLUCOSE 335, TROPONIN 0.48    MEDS/IV FLUIDS: #22 LH, #20 RAC 1 GM ROCEPHIN, 325 MG ASA RECTALLY, ERYTHROMYCIN 500 MG IV, HEPARIN 5000 UNIT BOLUS AND 12/KG/HR,  NS@500ML/HR      CARDIAC SYSTEM:    CHEST PAIN: NO    RATE:0    SCALE: 1/10    RHYTHM: SINUS TACH 1 MM DEPRESSION IN V5 V6    Is patient taking or has patient been given any drugs that could increase bleeding? YES  (Plavix, Brilinta, Effient, Eliquis, Xarelto, Warfarin, Integrilin, Angiomax)    DRUG: PLAVIX     DOSE/FREQUENCY: 75 MG DAILY      RESPIRATORY SYSTEM:    LUNG SOUNDS:    CLEAR: Y  CRACKLES: N  WHEEZES: N  RHONCHI: N  DIMINISHED: N    OXYGEN: YES    O2 SAT: 98    ADMINISTRATION ROUTE: 40% VENTI MASK.  SHE WAS 96% ON ROOM AIR BUT THEY SAID SHE WAS MOUTH BREATHING SO THEY PUT HER ON THE MASK    IMAGING FINDINGS: CHEST XRAY WAS NOT DONE THEY SAID THERE WASN'T TIME      CNS/MUSCULOSKELETAL      DAISY COMA SCALE:    E: 4  M: 6  V: 5    LAST KNOWN WELL: 830 AM          NIHSS    Survey Item  0: Means Alert  1: Drowsy or Answer Correctly  2: Incorrect, Forced, Can't Resist Gravity  3: Complete or No Effort  4: No Movement  NT: Not Testable Acceptable As Noted Above      1A: Level of Consciousness: 0    1B: LOC Questions (month, age) : 0    1C: LOC Commands (open/close eyes, make a fist & let go): 0    2:  Best Gaze (eyes open-pt follows examiner's fingers or face): 0    3:  Visual (introduce visual stimulus/threat to pt's visual field quad. Cover 1 eye and hold up fingers in all 4 quadrants) : 0    4.  Facial Palsy (show teeth, raise eyebrows and squeeze eyes tightly shut): 2    5A: Motor Arm-Left (elevate extremity to 90 degrees and score drift/movement.  Count to 10 aloud and use fingers for visual cue): 1    5B:  Motor Arm-Right (elevate extremity to 90 degrees and score drift/movement.  Count to 10 aloud and use fingers for visual cue): 0    6A:  Motor Leg-Left (elevate extremity to 30 degrees and score drift/movement.  Count to 5 out loud and use fingers for visual cue): 2    6B:  Motor Leg-Right (elevate extremity to 30 degrees and score drift/movement.  Count to 5 out loud and use fingers for visual cue):  2    7:  Limb Ataxia- finger to nose, heel down shin: 0    8:  Sensory- pin prick to face, arms, trunk, and legs. Compare sharpness side to side: 0    9:  Best Language- name, items, describe picture, and read sentences.  Do not forget glasses if they normally wear them: 0    10: Dysarthria- elevate speech clarity by pt reading or repeating words on a list: 2    11: Extinction and Inattention- Use information from prior testing or double simultaneous stimuli testing to identify neglect. Face, arms, legs and visual field: 0    Total NIHSS Score: 9-11  Date: 4/4/2020  Time of NIHSS Assessment: 2000        SIZE OF HEMORRHAGE: N/A    CAT SCAN RESULTS: NO ACUTE FINDINGS    MRI RESULTS: N/A    CNS/MUSCULOSKELETAL NOTES: NO      GI//GY      ABDOMINAL PAIN: NO    VOMITING: NO    DIARRHEA: NO    NAUSEA: NO    BOWEL SOUNDS: ACTIVE    OCCULT STOOL: UNKNOWN    VAGINAL BLEEDING: NO    TESTICULAR PAIN: N/A    HEMATURIA: NO     PAST MEDICAL HISTORY: DM, CHF, MI 2016, COPD, CAD, HTN    ADDITIONAL NOTES: NO URINE STUDIES ARE DONE.          Mary Hernandez RN  7/4/2020  21:40

## 2020-07-05 NOTE — PROGRESS NOTES
[facility] Medicine Services  ADMISSION FOLLOW-UP NOTE          Patient admitted after midnight, H&P by my partner performed earlier on today's date reviewed.  Interim findings, labs, and charting also reviewed.        The [facility] Hospital Problem List has been managed and updated to include any new diagnoses:  Active Hospital Problems    Diagnosis  POA   • **Left-sided weakness [R53.1]  Yes   • HTN (hypertension) [I10]  Yes   • CAD (coronary artery disease) [I25.10]  Yes   • Systolic CHF, chronic (CMS/HCC) [I50.22]  Yes   • DM2 (diabetes mellitus, type 2) (CMS/[facility]) [E11.9]  Yes   • Acute UTI (urinary tract infection) [N39.0]  Yes   • Acute hypoxemic respiratory failure (CMS/[facility]) [J96.01]  Yes      Resolved Hospital Problems   No resolved problems to display.         ADDITIONAL PLAN:  - detailed assessment and plan from admission reviewed  -Awaiting MRI of the brain.  Ceftriaxone treatment for urinary tract infection.  Continue treatment per protocol.  Neurology recommendations appreciated.  Supportive care and symptom treatment.  Repeat laboratory studies tomorrow.      Electronically signed by Karthik Vazquez MD, 07/05/20, 8:24 AM.

## 2020-07-05 NOTE — NURSING NOTE
WOC nurse consulted for Stage 2 pressure injury on right gluteus and left ankle; skin tear right lower leg. Pt having venous doppler and Echo at this time. Skin orders place, including Q2H turning, off loading heel boots. WOC nurse will f/u at later time.

## 2020-07-05 NOTE — PLAN OF CARE
Problem: Patient Care Overview  Goal: Plan of Care Review  Outcome: Outcome(s) achieved  Flowsheets (Taken 7/5/2020 1116)  Plan of Care Reviewed With: patient  Note:   SLP evaluation completed. Will sign-off for speech, language and cognition at this time . Please see note for further details and recommendations.

## 2020-07-05 NOTE — THERAPY DISCHARGE NOTE
"Acute Care - Speech Language Pathology Initial Eval/Discharge  [facility]     Patient Name: Steffen Zeng  : 1940  MRN: [id number]  Today's Date: 2020               Admit Date: 2020     Visit Dx:  No diagnosis found.  Patient Active Problem List   Diagnosis   • Acute hypoxemic respiratory failure (CMS/HCC)   • HTN (hypertension)   • CAD (coronary artery disease)   • Systolic CHF, chronic (CMS/HCC)   • DM2 (diabetes mellitus, type 2) (CMS/HCC)   • Left-sided weakness   • Acute UTI (urinary tract infection)     Past Medical History:   Diagnosis Date   • CHF (congestive heart failure) (CMS/HCC)    • Coronary artery disease    • Diabetes mellitus (CMS/HCC)    • Disease of thyroid gland    • Elevated cholesterol    • Hypertension    • Stroke (CMS/HCC)      Past Surgical History:   Procedure Laterality Date   • APPENDECTOMY     • CARDIAC CATHETERIZATION     •  SECTION     • CHOLECYSTECTOMY     • HYSTERECTOMY     • KNEE ARTHROSCOPY Left           SLP EVALUATION (last 72 hours)      SLP SLC Evaluation     Row Name 20 1030                   Communication Assessment/Intervention    Document Type  evaluation  -HG        Subjective Information  no complaints  -HG        Patient Observations  alert;cooperative;agree to therapy  -HG        Patient/Family Observations  Pt alert, in bed and stated, \"I have a bad memory.\"  -HG        Patient Effort  good  -HG        Symptoms Noted During/After Treatment  none  -HG           General Information    Patient Profile Reviewed  yes  -HG        Pertinent History Of Current Problem  Pt is an 79 year old female admitted with left sided weakness via stroke pathway.  Pt with dysarthria, left upper/lower extremity weakness, facial droop. Cardiac hx.   -HG        Precautions/Limitations, Vision  WFL with corrective lenses;other (see comments) glasses not present  -HG        Precautions/Limitations, Hearing  hearing impairment, bilaterally;other (see comments) not " present for eval.  -HG        Patient Level of Education  8th grade  -HG        Prior Level of Function-Communication  other (see comments) unknown- no family present  -        Plans/Goals Discussed with  patient  -HG        Barriers to Rehab  none identified  -           Pain Scale: Numbers Pre/Post-Treatment    Pre/Post Treatment Pain Comment  chronic back pain  -HG           Auditory Comprehension Assessment/Intervention    Auditory Comprehension (Communication)  WFL  -HG        Answers Questions (Communication)  mulit-unit;[employer]  -HG        Able to Follow Commands (Communication)  3-step;[employer]  -HG        Narrative Discourse  simple paragraph level;L  -HG           Reading Comprehension Assessment/Intervention    Visual Matching Ability (Reading)  picture/word;L  -HG           Verbal Expression Assessment/Intervention    Verbal Expression  WFL  -        Repetition  sentences;[employer]  -        Phrase Completion  WFL  -        Responsive Naming  WFL  -        Confrontational Naming  WFL  -        Spontaneous/Functional Words  WFL  -        Sentence Formulation  WFL  -        Conversational Discourse/Fluency  WF  -           Graphic Expression Assessment/Intervention    Graphic Expression  WFL  -        Biographical Information  name;address;[employer]  -           Oral Motor Structure and Function    Oral Motor Structure and Function  WFL  -HG        Dentition Assessment  natural, present and adequate  -        Mucosal Quality  moist, healthy  -           Oral Musculature and Cranial Nerve Assessment    Oral Motor General Assessment  WFL  -HG           Motor Speech Assessment/Intervention    Motor Speech Function  WFL  -HG           Cursory Voice Assessment/Intervention    Quality and Resonance (Voice)  WF  -           Cognitive Assessment Intervention- SLP    Orientation Status (Cognition)  awareness of basic personal information;person;place;time;situation;WFL  -        Memory (Cognitive)   simple;immediate;short-term;long-term;delayed;related;[facility]        Attention (Cognitive)  sustained;alternating;[facility]        Thought Organization (Cognitive)  concrete divergent;concrete convergent;mild impairment  -        Reasoning (Cognitive)  mental flexibility;[facility]        Problem Solving (Cognitive)  simple;[facility]        Pragmatics (Communication)  [facility]  -           SLP Clinical Impressions    SLP Diagnosis  Fxnl speech, language and cog skills at this time. Pt with reported memory deficits pre-existing before admitting symptoms started.   -           Recommendations    Therapy Frequency (SLP SLC)  evaluation only  -           SLP Discharge Summary    Discharge Destination  unknown  -          User Key  (r) = Recorded By, (t) = Taken By, (c) = Cosigned By    Initials Name Effective Dates    Cari Baer MS CCC-SLP 06/22/15 -            EDUCATION  The patient has been educated in the following areas:   Cognitive Impairment Communication Impairment.      SLP Recommendation and Plan  SLP Diagnosis: Fxnl speech, language and cog skills at this time. Pt with reported memory deficits pre-existing before admitting symptoms started.                    Plan of Care Reviewed With: patient              Time Calculation:   Time Calculation- SLP     Row Name 07/05/20 1120             Time Calculation- SLP    SLP Start Time  1030  -      SLP Received On  07/05/20  -        User Key  (r) = Recorded By, (t) = Taken By, (c) = Cosigned By    Initials Name Provider Type    Cari Baer MS CCC-SLP Speech and Language Pathologist          Therapy Charges for Today     Code Description Service Date Service Provider Modifiers Qty    89712000281 HC ST EVAL SPEECH AND PROD W LANG  4 7/5/2020 Cari Aly MS CCC-SLP GN 1                   SLP Discharge Summary  Discharge Destination: unknown    MS GINA Reed  7/5/2020

## 2020-07-05 NOTE — PROGRESS NOTES
Prelim LE US positive for DVT, heparin drip for now and monitor, consider NOAC later depending on findings and plan

## 2020-07-05 NOTE — CONSULTS
Stroke Consult Note    Patient Name: Steffen Zeng   MRN: [id number]  Age: 79 y.o.  Sex: female  : 1940    Primary Care Physician: Provider, No Known  Referring Physician:  Stacey Sommer MD    TIME STROKE TEAM CALLED:  EST     TIME PATIENT SEEN:  EST    Handedness: Unknown  Race:     Chief Complaint/Reason for Consultation: Altered mental status, LUE weakness,BLE weakness, slurred speech    Subjective .  HPI: Steffen Zeng is an 79 yr old female with a PMH significant for HTN, T2DM, CHF, MI (), CAD, COPD, and stroke (per family she received TPA 1 yr ago), that presents to [facility] via [facility] from [facility] for further evaluation of stroke symptoms. She is on Plavix daily.  Per the OSH, the patient's LKW was 0830 2020. Her daughter tried to call her at 1650 and was unable to reach her. She went to her home @ 1700 and found her sitting on the toilet slumped to the left side and unresponsive. EMS was called and the patient was transported to the OSH. Upon arrival there, she was noted to respond to voice and follow simple commands. BLE weakness, diaphoresis, and slurred speech noted as well. Her initial FSBS was 370. GCS 13. NIH fluctuating from 9-11 per ED physician. The ED physician denied any SOA, fever, cough, or N/V/D. He stated the patient was on room air and normotensive. CTH @ OSH was negative for any acute processes. Rapid COVID test @ OSH was reported as negative. 325mg ASA given per rectum.     Upon arrival to [facility], the patient was found to be on antibiotics and a heparin gtt. Per the flight crew, the patient was diagnosed with a NSTEMI @ the OSH. Troponin was 0.48. Her WBC was 13.94. In addition, she was on a 40% venti mask on arrival. She was taken to CT for a CTA/CTP. Dr. Sommer notified of changes from original report. CT chest and abdomen ordered. CT chest revealed bibasilar atelectasis. CT abdomen negative for any acute processes. CTP indicated a 10cc  area of ischemic change in the left occipital lobe without visualized core infarction. CTA H/N with no significant carotid stenosis. RPCA visualized. Proximal  LPCA as well as the proximal vertebral arteries are difficult to visualize per radiology.      Last Known Normal Date/Time: 0830 7/4/2020EST     Review of Systems   Constitutional: Positive for activity change and diaphoresis.   HENT: Negative.    Eyes: Negative.    Respiratory: Positive for shortness of breath.    Gastrointestinal: Positive for abdominal pain.   Endocrine: Negative.    Genitourinary: Negative.    Musculoskeletal: Positive for back pain.   Skin: Negative.    Allergic/Immunologic: Negative.    Neurological: Positive for speech difficulty and weakness.      Past Medical History:   Diagnosis Date   • Arthritis    • Coronary artery disease    • Elevated cholesterol    • Hypertension    • Stroke (CMS/HCC)      No past surgical history on file.  No family history on file.  Social History     Socioeconomic History   • Marital status:      Spouse name: Not on file   • Number of children: Not on file   • Years of education: Not on file   • Highest education level: Not on file   Tobacco Use   • Smoking status: Never Smoker   • Smokeless tobacco: Never Used     Not on File  Prior to Admission medications    Not on File             Objective     Temp:  [96.5 °F (35.8 °C)] 96.5 °F (35.8 °C)  Heart Rate:  [81-86] 81  Resp:  [18] 18  BP: (132)/(83) 132/83  Neurological Exam  Mental Status  Awake and alert. Oriented only to person. dysarthria present. Language is fluent with no aphasia.    Cranial Nerves  CN II: Visual fields full to confrontation.  CN III, IV, VI: Extraocular movements intact bilaterally. Extraocular movements intact bilaterally. Normal lids and orbits bilaterally. Pupils equal round and reactive to light bilaterally.  CN V: Facial sensation is normal.  CN VII:  Right: Minor facial droop.  CN VIII: Hearing is normal.  CN IX, X:  Palate elevates symmetrically  CN XI: Shoulder shrug strength is normal.  CN XII: Tongue midline without atrophy or fasciculations.    Motor    MAZARIEGOS x4. BLE strength 3/5 on arrival to [facility]. When reassessed after CT obtained, improved to 5/5. 5/5 in BUE. .    Sensory  RLE.       Physical Exam   Constitutional: She appears well-developed and well-nourished.   HENT:   Head: Normocephalic and atraumatic.   Eyes: Pupils are equal, round, and reactive to light. EOM and lids are normal.   Neck: Normal range of motion. Neck supple.   Cardiovascular: Normal rate and regular rhythm.   Pulmonary/Chest: Breath sounds normal.   SOA when lying flat   Abdominal: Soft. Bowel sounds are normal.   Musculoskeletal:   As above   Neurological: She is alert.   As above   Skin: Skin is warm and dry.       Acute Stroke Data    Alteplase (tPA) Inclusion / Exclusion Criteria    Time: 2325  Person Administering Scale: KAIA Ho    Inclusion Criteria  [x]   18 years of age or greater   []   Onset of symptoms < 4.5 hours before beginning treatment (stroke onset = time patient was last seen well or without symptoms).   []   Diagnosis of acute ischemic stroke causing measurable disabling deficit (Complete Hemianopia, Any Aphasia, Visual or Sensory Extinction, Any weakness limiting sustained effort against gravity)   []   Any remaining deficit considered potentially disabling in view of patient and practitioner   Exclusion criteria (Do not proceed with Alteplase if any are checked under exclusion criteria)  [x]   Onset unknown or GREATER than 4.5 hours   []   ICH on CT/MRI   []   CT demonstrates hypodensity representing acute or subacute infarct   []   Significant head trauma or prior stroke in the previous 3 months   []   Symptoms suggestive of subarachnoid hemorrhage   []   History of un-ruptured intracranial aneurysm GREATER than 10 mm   []   Recent intracranial or intraspinal surgery within the last 3 months   []   Arterial  puncture at a non-compressible site in the previous 7 days   []   Active internal bleeding   []   Acute bleeding tendency   []   Platelet count LESS than 100,000 for known hematological diseases such as leukemia, thrombocytopenia or chronic cirrhosis   []   Current use of anticoagulant with INR GREATER than 1.7 or PT GREATER than 15 seconds, aPTT GREATER than 40 seconds   []   Heparin received within 48 hours, resulting in abnormally elevated aPTT GREATER than upper limit of normal   []   Current use of direct thrombin inhibitors or direct factor Xa inhibitors in the past 48 hours   []   Elevated blood pressure refractory to treatment (systolic GREATER than 185 mm/Hg or diastolic  GREATER than 110 mm/Hg   []   Suspected infective endocarditis and aortic arch dissection   []   Current use of therapeutic treatment dose of low-molecular-weight heparin (LMWH) within the previous 24 hours   []   Structural GI malignancy or bleed   Relative exclusion for all patients  []   Only minor non-disabling symptoms   []   Pregnancy   []   Seizure at onset with postictal residual neurological impairments   []   Major surgery or previous trauma within past 14 days   []   History of previous spontaneous ICH, intracranial neoplasm, or AV malformation   []   Postpartum (within previous 14 days)   []   Recent GI or urinary tract hemorrhage (within previous 21 days)   []   Recent acute MI (within previous 3 months)   []   History of un-ruptured intracranial aneurysm LESS than 10 mm   []   History of ruptured intracranial aneurysm   []   Blood glucose LESS than 50 mg/dL (2.7 mmol/L)   []   Dural puncture within the last 7 days   []   Known GREATER than 10 cerebral microbleeds   Additional exclusions for patients with symptoms onset between 3 and 4.5 hours.  []   Age > 80.   []   On any anticoagulants regardless of INR  >>> Warfarin (Coumadin), Heparin, Enoxaparin (Lovenox), fondaparinux (Arixtra), bivalirudin (Angiomax), Argatroban,  dabigatran (Pradaxa), rivaroxaban (Xarelto), or apixaban (Eliquis)   []   Severe stroke (NIHSS > 25).   []   History of BOTH diabetes and previous ischemic stroke.   []   The risks and benefits have been discussed with the patient or family related to the administration of IV Alteplase for stroke symptoms.   []   I have discussed and reviewed the patient's case and imaging with the attending prior to IV Alteplase.    Time Alteplase administered       Hospital Meds:  Scheduled-     aspirin 325 mg Oral Daily   Or      aspirin 300 mg Rectal Daily   atorvastatin 80 mg Oral Nightly   fluticasone 2 spray Each Nare Daily   sodium chloride 10 mL Intravenous Q12H     Infusions-     PRNs- •  acetaminophen  •  sodium chloride    Functional Status Prior to Current Stroke/[place] Score: 2    NIH Stroke Scale  Time: 5  Person Administering Scale: KAIA Ho    1a  Level of consciousness: 0=alert; keenly responsive   1b. LOC questions:  1=Performs one task correctly   1c. LOC commands: 0=Performs both tasks correctly   2.  Best Gaze: 0=normal   3.  Visual: 0=No visual loss   4. Facial Palsy: 1=Minor paralysis   5a.  Motor left arm: 1=Drift, limb holds 90 (or 45) degrees but drifts down before full 10 seconds: does not hit bed   5b.  Motor right arm: 0=No drift, limb holds 90 (or 45) degrees for full 10 seconds   6a. motor left le=Some effort against gravity, limb cannot get to or maintain (if cured) 90 (or 45) degrees, drifts down to bed, but has some effort against gravity   6b  Motor right le=Some effort against gravity, limb cannot get to or maintain (if cured) 90 (or 45) degrees, drifts down to bed, but has some effort against gravity   7. Limb Ataxia: 0=Absent   8.  Sensory: 1=Mild to moderate sensory loss; patient feels pinprick is less sharp or is dull on the affected side; there is a loss of superficial pain with pinprick but patient is aware She is being touched   9. Best Language:  0=No aphasia,  normal   10. Dysarthria: 1=Mild to moderate, patient slurs at least some words and at worst, can be understood with some difficulty   11. Extinction and Inattention: 0=No abnormality    Total:   9       Results Reviewed:  I have personally reviewed current lab, radiology, and data and agree with results.    Ct Abdomen Pelvis Without Contrast    Result Date: 7/4/2020  Negative CT of the abdomen and pelvis. Signer Name: Franklin Bella MD  Signed: 7/4/2020 11:56 PM  Workstation Name: [facility]  Radiology [facility]    Ct Angiogram Neck    Result Date: 7/5/2020  Study is somewhat limited. There is poor evaluation of both posterior cerebral arteries. Probably the right one is present but is difficult see the proximal left PCA. It is also Also difficult see the proximal vertebral arteries in the neck. No significant carotid stenosis is identified. Signer Name: Franklin Bella MD  Signed: 7/5/2020 12:17 AM  Workstation Name: [facility]    Ct Chest Without Contrast    Result Date: 7/4/2020  Minimal bibasilar atelectasis. Otherwise negative CT chest Signer Name: Franklin Bella MD  Signed: 7/4/2020 11:53 PM  Workstation Name: [facility]    Ct Angiogram Head    Result Date: 7/5/2020  Study is somewhat limited. There is poor evaluation of both posterior cerebral arteries. Probably the right one is present but is difficult see the proximal left PCA. It is also Also difficult see the proximal vertebral arteries in the neck. No significant carotid stenosis is identified. Signer Name: Franklin Bella MD  Signed: 7/5/2020 12:17 AM  Workstation Name: [facility]    Ct Head Without Contrast Stroke Protocol    Result Date: 7/4/2020  Normal, negative unenhanced head CT. Signer Name: Franklin Bella MD  Signed: 7/4/2020 11:50 PM  Workstation Name: [facility]    Ct Cerebral Perfusion With &  Without Contrast    Result Date: 7/5/2020  There is a 10 cc area of ischemic change in the left occipital lobe without visualized core infarct. I have given the results to the patient's nurse (Karly) by telephone at 12 4:00 AM Signer Name: Franklin Bella MD  Signed: 7/5/2020 12:04 AM  Workstation Name: [email or web address]              Assessment/Plan:  Steffen Zeng is an 79 yr old female with a PMH significant for HTN, T2DM, CHF, MI (2016), CAD, COPD, and stroke (per family she received TPA 1 yr ago), that presents to [facility] via [facility] from [facility] for further evaluation of stroke symptoms. CTP indicated a 10cc area of ischemic change in the left occipital lobe without visualized core infarction.        Altered mental status, LUE weakness,BLE weakness, slurred speech;   -CTP indicated a 10cc area of ischemic change in the left occipital lobe without visualized core infarction.   -CTA H/N with no significant carotid stenosis. RPCA visualized. Proximal  LPCA as well as the proximal vertebral arteries are difficult to visualize per radiology.  -Hx of CVA; unknown at this time if she has residual deficits    -Ischemic Stroke without thrombolytic therapy order set  -MRI later today  -ASA 325mg; given SD @ OSH yesterday  -High dose statin; Lipids in am.   -T2DM; HgbA1c in am; SSI per hospital medicine  -CHF; Echo later today  -COVID testing from OSH negative; retesting per hospital medicine; patient placed in contact and airborne precautions  -?NSTEMI; Serial troponins ordered; Heparin on hold per hospital medicine  -HTN; permissive for 24 hrs  -PT/OT/SLP  -Fall precautions  -Bed rest      KAIA Ho, AGACNP-BC, Stroke Navigator  July 5, 2020  12:43 AM

## 2020-07-05 NOTE — H&P
[facility] Medicine Services  HISTORY AND PHYSICAL    Patient Name: Steffen Zeng  : 1940  MRN: [id number]  Primary Care Physician: Provider, No Known  Date of admission: 2020      Subjective   Subjective     Chief Complaint:  Transferred from [facility] for stroke evaluation, patient moderate historian.  History obtained from the chart    HPI:  Steffen Zeng is a 79 y.o. female, presented to our hospital for stroke work-up.  As per records at 5 PM on 2020-daughter found patient slumped over on toilet, pale, very diaphoretic, had no strength in her extremities, awake but not answering questions-the last time she was known well was at 8:30 AM in the morning.  Patient was noted to have left-sided weakness- his NIH stroke scale-was reported as 9- for dysarthria, left upper/lower extremity weakness, facial droop.    On ED work-up patient was also found to have leukocytosis, troponin-0.48, EKG was reported as 1 mm ST depression in V5-V6 sinus tachycardia-currently there are no records available to review.    Patient herself complains chiefly of back pain currently, does not complain of headache, do not complain of any chest pain, or abdominal pain.  No acute illness is reported by patient.  Denies any complaint of cough, does complain of exertional dyspnea.  Patient cannot tell us whether she takes aspirin or Plavix at home, but does state that she has stroke affecting her left upper extremity in the past.    At the external ED patient was given aspirin 325 per rectum, Rocephin, erythromycin and heparin drip was initiated, normal saline at 500.    Review of Systems   Complete ROS done, which is negative except as above and HPI.  Old records reviewed and summarized in PM hx    Past medical history  DM 2  COPD  CHF  Hypertension  CAD- with history of MI in 2016  Morbidly obese  ?  History of CVA-affecting right upper extremity      Past surgical history- hysterectomy, stent  placement  P hysterectomy, stent as past medical history past medical history t medical history  Past medical historcal Histor  Family History: Patient could not give any detailed history.    Social History:  Currently non-smoker and no alcohol use.    Social History     Social History Narrative   • Not on file       Medications:  Available home medication information reviewed.  No medications prior to admission.       Not on File    Objective   Objective     Vital Signs:   Temp:  [96.5 °F (35.8 °C)] 96.5 °F (35.8 °C)  Heart Rate:  [86] 86  Resp:  [18] 18  BP: (132)/(83) 132/83   Total (NIH Stroke Scale): 8    Physical Exam     GENERAL-obese, not in distress  RS-decreased air entry anteriorly  CVS- s1s2 Regular, no murmur.  ABD-distended bowel sounds positive, nontender  EXT-2+ edema  NEURO- AAO-place, person not so much to time, speech is slow- but clear, finger  equal in both hands, left upper/lower extremity slightly weaker than the right, no obvious sensory deficit could be elicited, Babinski equivocal both extremity, deep tendon reflexes 2+,  EYES-extraocular movement intact, visual field could not be tested conjunctivae are normal. Pupils are equal, round, and reactive to light. No scleral icterus.   ENT- no external ear nose lesions, mucosa moist.  NECK- No JVD present. No tracheal deviation present. No thyromegaly present,No cervical lymphadenopathy.  JOINTS/MSK- no deformity, no swelling.  SKIN- no rash , warm to touch.  PSYCHIATRIC- Normal mood and affect. Behavior is normal. Thought content normal.     Results Reviewed:  I have personally reviewed current lab and radiology data.              Invalid input(s):  ALKPHOS  CrCl cannot be calculated (No successful lab value found.).  Brief Urine Lab Results     None        Imaging Results (Last 24 Hours)     Procedure Component Value Units Date/Time    CT Angiogram Neck [[id number]] Collected:  07/05/20 0017     Updated:  07/05/20 0019    Narrative:        CTA Neck, CTA Head    INDICATION:   Decreased responsiveness. Abnormal perfusion study showing ischemic change in the left occipital lobe    TECHNIQUE:   CT angiogram of the head and neck with IV contrast. 3-D reconstructions were obtained and reviewed.  Evaluation for a significant carotid arterial stenosis is based on the NASCET criteria. Radiation dose reduction techniques included automated exposure  control or exposure modulation based on body size. Count of known CT and cardiac nuc med studies performed in previous 12 months: 0.     COMPARISON:   CT brain and CT perfusion study of the brain from today    FINDINGS:   CTA neck:  Lung apices clear. Thyroid gland is enlarged and has a low-density lesion in the right lobe measuring 2.6 cm in diameter. Submandibular and parotid glands are normal.    Vascular findings aortic arch is normal in size. Great vessels are all patent. The study slightly degraded by motion. It is difficult to see either vertebral artery in the lower neck. There is reconstitution of the more distal vertebral arteries and the  left one is dominant. 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 There are calcified plaques in each carotid bifurcation region without significant stenosis. The rest of the internal carotid arteries are patent.    CTA head:  The middle and anterior tissue arteries appear normal.. A right posterior cerebral artery appears be present but is a lot of venous opacification. Its difficult to clearly see the left posterior cerebral artery.      Impression:       Study is somewhat limited. There is poor evaluation of both posterior cerebral arteries. Probably the right one is present but is difficult see the proximal left PCA. It is also Also difficult see the proximal vertebral arteries in the neck.    No significant carotid stenosis is identified.    Signer Name: Franklin Bella MD   Signed: 7/5/2020 12:17 AM   Workstation Name: [facility]    [facility]      CT Angiogram Head [[id number]] Collected:  07/05/20 0017     Updated:  07/05/20 0019    Narrative:       CTA Neck, CTA Head    INDICATION:   Decreased responsiveness. Abnormal perfusion study showing ischemic change in the left occipital lobe    TECHNIQUE:   CT angiogram of the head and neck with IV contrast. 3-D reconstructions were obtained and reviewed.  Evaluation for a significant carotid arterial stenosis is based on the NASCET criteria. Radiation dose reduction techniques included automated exposure  control or exposure modulation based on body size. Count of known CT and cardiac nuc med studies performed in previous 12 months: 0.     COMPARISON:   CT brain and CT perfusion study of the brain from today    FINDINGS:   CTA neck:  Lung apices clear. Thyroid gland is enlarged and has a low-density lesion in the right lobe measuring 2.6 cm in diameter. Submandibular and parotid glands are normal.    Vascular findings aortic arch is normal in size. Great vessels are all patent. The study slightly degraded by motion. It is difficult to see either vertebral artery in the lower neck. There is reconstitution of the more distal vertebral arteries and the  left one is dominant. 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 There are calcified plaques in each carotid bifurcation region without significant stenosis. The rest of the internal carotid arteries are patent.    CTA head:  The middle and anterior tissue arteries appear normal.. A right posterior cerebral artery appears be present but is a lot of venous opacification. Its difficult to clearly see the left posterior cerebral artery.      Impression:       Study is somewhat limited. There is poor evaluation of both posterior cerebral arteries. Probably the right one is present but is difficult see the proximal left PCA. It is also Also difficult see the proximal vertebral arteries in the neck.    No significant carotid stenosis is identified.    Signer Name: Franklin  MD Shayne   Signed: 7/5/2020 12:17 AM   Workstation Name: [facility]    [facility]    CT Cerebral Perfusion With & Without Contrast [[id number]] Collected:  07/05/20 0004     Updated:  07/05/20 0006    Narrative:       CT CEREBRAL PERFUSION W WO CONTRAST    HISTORY:       TECHNIQUE:   Axial CT images of the brain without and with intravenous contrast using cerebral perfusion protocol. Post-processing parametric maps were created using RAPID software and reviewed. Radiation dose reduction techniques included automated exposure control  or exposure modulation based on body size. CT and nuclear cardiology exams in the last 12 months: 0.    COMPARISON:   CT scan earlier today    FINDINGS:   Arterial input function is optimal.     Ischemic tissue volume (Tmax > 6 seconds, includes core and penumbra): 10 cc in the left occipital lobe  Ischemic core volume (rCBF < 30%): 0  Mismatch (penumbra) volume 10 cc, ratio and then it  Volume of poor collateral perfusion (Tmax > 10 seconds): 0  Hypoperfusion index (Tmax > 10 seconds/Tmax > 6 seconds): 0  CBV index: 0.1      Impression:       There is a 10 cc area of ischemic change in the left occipital lobe without visualized core infarct.    I have given the results to the patient's nurse (Karly) by telephone at 12 4:00 AM          Signer Name: Franklin Bella MD   Signed: 7/5/2020 12:04 AM   Workstation Name: [facility]    CT Abdomen Pelvis Without Contrast [[id number]] Collected:  07/04/20 2356     Updated:  07/04/20 2358    Narrative:       CT Abdomen Pelvis WO    INDICATION:   Abdominal pain. Decreased responsiveness    TECHNIQUE:   CT of the abdomen and pelvis without IV contrast. Coronal and sagittal reconstructions were obtained.  Radiation dose reduction techniques included automated exposure control or exposure modulation based on body size. Count of known CT and cardiac nuc  med studies performed in previous 12  months: 0.     COMPARISON:   None available.    FINDINGS:  Abdomen: The gallbladder is been removed. The liver, spleen, pancreas, adrenal glands and kidneys are normal in appearance. Aorta is normal in size. There is no adenopathy. Bowel is normal except for a few sigmoid diverticuli.    Pelvis: Uterus is been removed. There are no adnexal masses. The bladder is normal except for focal area of benign-appearing calcification so she with posterior wall. This is 12 mm in diameter.      Impression:       Negative CT of the abdomen and pelvis.          Signer Name: Franklin Bella MD   Signed: 7/4/2020 11:56 PM   Workstation Name: [facility]    CT Chest Without Contrast [[id number]] Collected:  07/04/20 2353     Updated:  07/04/20 2355    Narrative:       CT Chest WO    INDICATION:   Shortness of air tonight    TECHNIQUE:   CT of the thorax without IV contrast. Coronal and sagittal reconstructions were obtained.  Radiation dose reduction techniques included automated exposure control or exposure modulation based on body size. Count of known CT and cardiac nuc med studies  performed in previous 12 months: 0.     COMPARISON:   None available.    FINDINGS:  There is minimal basilar atelectasis otherwise the lungs are clear. The airways are patent. The thyroid tissue is enlarged bilaterally. The aorta is normal in size. There is no adenopathy. Bones are unremarkable.      Impression:       Minimal bibasilar atelectasis. Otherwise negative CT chest    Signer Name: Franklin Bella MD   Signed: 7/4/2020 11:53 PM   Workstation [facility]: [facility]    CT Head Without Contrast Stroke Protocol [[id number]] Collected:  07/04/20 2350     Updated:  07/04/20 2352    Narrative:       CT Head WO Code Stroke    HISTORY:   Decreased responsiveness tonight    TECHNIQUE:   Axial unenhanced head CT. Radiation dose reduction techniques included automated exposure control or  exposure modulation based on body size. Count of known CT and cardiac nuc med studies performed in previous 12 months: 0.     Time of scan: 11:40 PM    COMPARISON:   None.    FINDINGS:   No intracranial hemorrhage, mass, or infarct. No hydrocephalus or extra-axial fluid collection. Brain parenchymal density is normal. The skull base, calvarium, and extracranial soft tissues are normal.      Impression:       Normal, negative unenhanced head CT.          Signer Name: Franklin Bella MD   Signed: 7/4/2020 11:50 PM   Workstation Name: [email or web address]    [facility]             Ekg-  Cxr-    Assessment/Plan   Active Hospital Problems    Diagnosis POA   • Acute hypoxemic respiratory failure (CMS/HCC) [J96.01] Yes       Assessment & Plan   Acute onset of left-sided weakness-  CT perfusion-positive for ischemia changes in the left occipital lobe without any visualized cord infarct.  CT angiogram head and neck- limited study-please see the official report.  -Patient already seen by stroke navigator, and received aspirin 325 external ED, high-dose statin initiated.  - We will try to call the daughter.  (Need updated medication list).    Leukocytosis- history of recurrent UTI, already started on Rocephin will continue that.  -UA still pending please follow-up.    Troponin- 0.48, 0.44-no acute ST changes on EKG, no complaint of chest pain-we will continue to monitor.    CKD-monitor the creatinine.  BUN/creatinine 23/1.2    DM 2-current blood glucose is 329, will start with fingerstick coverage, currently patient n.p.o.    COPD?-initially patient was on 40% FiO2, not in any acute respiratory distress, once the patient settled down she was noted to be on 97% on 2 L nasal cannula.  - Patient had a rapid COVID testing done at the external ED which was negative, considering she was hypoxic initially, patient is again retested for COVID-19.    CHF/chronic pedal edema-on diuretics-Lasix, dose unknown-patient does  appear to have pedal edema, home meds unknown, proBNP sent, echocardiogram in a.m., currently patient is not overtly hypoxic-once we have more data- can start her on her home meds.  CT chest-minimal bibasilar atelectasis.    Hypertension-will allow permissive hypertension secondary to acute CVA.  As per daughter-on losartan 50 mg p.o. every 12, Imdur 30 mg p.o. daily    CAD- with history of MI in 2016- patient is unsure about her home medications, troponin elevated at the external ED at 0.4, EKG-sinus rhythm at 84 bpm, first-degree AV block, , Q waves in lead III and V1 and V2 no acute ST-T wave changes noted.  -Recently was seen by cardiologist in [place], offered her stress test which she declined.  -Follow-up troponin trend, currently holding the heparin drip until we get more results.    Morbidly obese- monitor for hypoxia during her sleep.    ?  History of CVA-affecting left upper extremity- which has completely resolved-currently on aspirin 81 mg.    Chronic severe back pain/spinal stenosis/neuropathy-on Lyrica, hydrocodone-q 6 hrly.-  Which family thinks needs to be restarted as soon as possible-given her history of almost.  Withdrawal symptoms in the past.    History of recurrent UTI-recently treated inpatient.    DVT prophylaxis:    -TEDs/SCDs  -Lovenox    CODE STATUS:    Code Status and Medical Interventions:   Ordered at: 07/04/20 1948     Level Of Support Discussed With:    Patient     Code Status:    CPR     Medical Interventions (Level of Support Prior to Arrest):    Full       Admission Communication  Due to current limited visitation policies, an attempt will be made to update patient's identified best point-of-contact(s) at admission to discuss plan of care.   Contact: shaggy Kwon   Relation:    Time of communication: phone call 2 am  [phone]     Notes (if applicable):      Admission Status:  I believe this patient meets inpatient criteria secondary to need of work-up for stroke,  altered mental status.    Electronically signed by Radha Reed MD, 07/05/20, 12:46 AM.

## 2020-07-05 NOTE — PROGRESS NOTES
HEPARIN INFUSION    Steffen Zeng is an 79 y.o. female receiving heparin infusion.     Therapy for (VTE/Cardiac): VTE  Patient Weight: 112 kg  Initial Bolus (Y/N): No - received at OSH on 7/4  Any Bolus (Y/N): Yes        Signs or Symptoms of Bleeding: none noted    VTE (PE/DVT)   Initial Bolus: 80 units/kg (Max 10,000 units)  Initial rate: 18 units/kg/hr (Max 1,500 units/hr)      (Anti-Xa) (IU/mL) Bolus Dose Stop Infusion Rate Change Repeat (Anti-Xa)     ? 0.19 80 units/kg 0 hrs Increase rate by 4 units/kg/hr 6 hrs      0.2 - 0.29 40 units/kg 0 hrs Increase rate by 2 units/kg/hr 6 hrs    0.3 - 0.7  0 0 hrs No change 6 hrs    0.71 - 0.99   0  0 hrs Decrease rate by 2 units/kg/hr 6 hrs      ? 1 0 Hold 1 hr Decrease rate by 3 units/kg/hr 6 hrs      Results from last 7 days   Lab Units 07/05/20  1045 07/05/20  0513 07/05/20  0055   INR  1.12  --  1.16   HEMOGLOBIN g/dL  --  11.4* 12.3   HEMATOCRIT %  --  35.4 37.7   PLATELETS 10*3/mm3  --  138* 144        Date   Time   Anti-Xa Current Rate (Unit/kg/hr) Bolus   (Units) Rate Change   (Unit/kg/hr) New Rate (Unit/kg/hr) Next   Anti-Xa Comments  Pump Check Daily   7/5 1100 0.1 OFF -- -- 13.4 1700 Hep gtt started at OSH but was held upon admission here; d/w RN                                                                                                                                                                                                                                 Kendrick Washington, PharmD, BCPS  7/5/2020  11:51

## 2020-07-06 ENCOUNTER — APPOINTMENT (OUTPATIENT)
Dept: CT IMAGING | Facility: HOSPITAL | Age: 80
End: 2020-07-06

## 2020-07-06 PROBLEM — I21.4 NSTEMI, INITIAL EPISODE OF CARE (HCC): Status: ACTIVE | Noted: 2020-07-06

## 2020-07-06 PROBLEM — E78.5 HYPERLIPIDEMIA LDL GOAL <70: Status: ACTIVE | Noted: 2020-07-06

## 2020-07-06 PROBLEM — I21.4 NSTEMI, INITIAL EPISODE OF CARE (HCC): Status: RESOLVED | Noted: 2020-07-06 | Resolved: 2020-07-06

## 2020-07-06 PROBLEM — N18.30 CKD (CHRONIC KIDNEY DISEASE) STAGE 3, GFR 30-59 ML/MIN (HCC): Status: ACTIVE | Noted: 2020-07-06

## 2020-07-06 PROBLEM — I24.8 DEMAND ISCHEMIA (HCC): Status: ACTIVE | Noted: 2020-07-06

## 2020-07-06 PROBLEM — IMO0002 UNCONTROLLED TYPE 2 DIABETES MELLITUS: Status: ACTIVE | Noted: 2020-07-05

## 2020-07-06 PROBLEM — Z86.73 HISTORY OF CVA (CEREBROVASCULAR ACCIDENT): Status: ACTIVE | Noted: 2020-07-06

## 2020-07-06 PROBLEM — I50.9 CHRONIC HEART FAILURE (HCC): Status: ACTIVE | Noted: 2020-07-05

## 2020-07-06 LAB
ANION GAP SERPL CALCULATED.3IONS-SCNC: 12 MMOL/L (ref 5–15)
BACTERIA SPEC AEROBE CULT: NO GROWTH
BUN SERPL-MCNC: 17 MG/DL (ref 8–23)
BUN/CREAT SERPL: 17.7 (ref 7–25)
CALCIUM SPEC-SCNC: 9.1 MG/DL (ref 8.6–10.5)
CHLORIDE SERPL-SCNC: 102 MMOL/L (ref 98–107)
CO2 SERPL-SCNC: 25 MMOL/L (ref 22–29)
CREAT SERPL-MCNC: 0.96 MG/DL (ref 0.57–1)
DEPRECATED RDW RBC AUTO: 44.4 FL (ref 37–54)
ERYTHROCYTE [DISTWIDTH] IN BLOOD BY AUTOMATED COUNT: 13.2 % (ref 12.3–15.4)
GFR SERPL CREATININE-BSD FRML MDRD: 56 ML/MIN/1.73
GLUCOSE BLDC GLUCOMTR-MCNC: 236 MG/DL (ref 70–130)
GLUCOSE BLDC GLUCOMTR-MCNC: 266 MG/DL (ref 70–130)
GLUCOSE BLDC GLUCOMTR-MCNC: 294 MG/DL (ref 70–130)
GLUCOSE BLDC GLUCOMTR-MCNC: 323 MG/DL (ref 70–130)
GLUCOSE SERPL-MCNC: 294 MG/DL (ref 65–99)
HCT VFR BLD AUTO: 36.6 % (ref 34–46.6)
HGB BLD-MCNC: 12.1 G/DL (ref 12–15.9)
MCH RBC QN AUTO: 30.6 PG (ref 26.6–33)
MCHC RBC AUTO-ENTMCNC: 33.1 G/DL (ref 31.5–35.7)
MCV RBC AUTO: 92.7 FL (ref 79–97)
PLATELET # BLD AUTO: 153 10*3/MM3 (ref 140–450)
PMV BLD AUTO: 12 FL (ref 6–12)
POTASSIUM SERPL-SCNC: 3.5 MMOL/L (ref 3.5–5.2)
RBC # BLD AUTO: 3.95 10*6/MM3 (ref 3.77–5.28)
SODIUM SERPL-SCNC: 139 MMOL/L (ref 136–145)
UFH PPP CHRO-ACNC: 0.57 IU/ML (ref 0.3–0.7)
UFH PPP CHRO-ACNC: 0.81 IU/ML (ref 0.3–0.7)
WBC # BLD AUTO: 9.84 10*3/MM3 (ref 3.4–10.8)

## 2020-07-06 PROCEDURE — 25010000002 ONDANSETRON PER 1 MG: Performed by: INTERNAL MEDICINE

## 2020-07-06 PROCEDURE — 0 IOPAMIDOL PER 1 ML: Performed by: INTERNAL MEDICINE

## 2020-07-06 PROCEDURE — 63710000001 INSULIN DETEMIR PER 5 UNITS: Performed by: INTERNAL MEDICINE

## 2020-07-06 PROCEDURE — G0108 DIAB MANAGE TRN  PER INDIV: HCPCS

## 2020-07-06 PROCEDURE — 99232 SBSQ HOSP IP/OBS MODERATE 35: CPT | Performed by: NURSE PRACTITIONER

## 2020-07-06 PROCEDURE — 25010000002 HEPARIN (PORCINE) 25000-0.45 UT/250ML-% SOLUTION

## 2020-07-06 PROCEDURE — 97110 THERAPEUTIC EXERCISES: CPT

## 2020-07-06 PROCEDURE — 97165 OT EVAL LOW COMPLEX 30 MIN: CPT

## 2020-07-06 PROCEDURE — 99232 SBSQ HOSP IP/OBS MODERATE 35: CPT | Performed by: INTERNAL MEDICINE

## 2020-07-06 PROCEDURE — 25010000002 CEFTRIAXONE PER 250 MG: Performed by: INTERNAL MEDICINE

## 2020-07-06 PROCEDURE — 99222 1ST HOSP IP/OBS MODERATE 55: CPT | Performed by: NURSE PRACTITIONER

## 2020-07-06 PROCEDURE — 25010000002 MORPHINE PER 10 MG: Performed by: INTERNAL MEDICINE

## 2020-07-06 PROCEDURE — 80048 BASIC METABOLIC PNL TOTAL CA: CPT | Performed by: INTERNAL MEDICINE

## 2020-07-06 PROCEDURE — 82962 GLUCOSE BLOOD TEST: CPT

## 2020-07-06 PROCEDURE — 85520 HEPARIN ASSAY: CPT

## 2020-07-06 PROCEDURE — 63710000001 INSULIN LISPRO (HUMAN) PER 5 UNITS: Performed by: INTERNAL MEDICINE

## 2020-07-06 PROCEDURE — 71275 CT ANGIOGRAPHY CHEST: CPT

## 2020-07-06 PROCEDURE — 85027 COMPLETE CBC AUTOMATED: CPT | Performed by: INTERNAL MEDICINE

## 2020-07-06 RX ORDER — DULOXETIN HYDROCHLORIDE 60 MG/1
60 CAPSULE, DELAYED RELEASE ORAL NIGHTLY
Status: DISCONTINUED | OUTPATIENT
Start: 2020-07-06 | End: 2020-07-10 | Stop reason: HOSPADM

## 2020-07-06 RX ORDER — CLONIDINE HYDROCHLORIDE 0.1 MG/1
0.1 TABLET ORAL 2 TIMES DAILY PRN
Status: ON HOLD | COMMUNITY
End: 2020-07-06

## 2020-07-06 RX ORDER — LORAZEPAM 1 MG/1
1 TABLET ORAL NIGHTLY PRN
Status: DISCONTINUED | OUTPATIENT
Start: 2020-07-06 | End: 2020-07-10 | Stop reason: HOSPADM

## 2020-07-06 RX ORDER — CASTOR OIL AND BALSAM, PERU 788; 87 MG/G; MG/G
OINTMENT TOPICAL EVERY 12 HOURS SCHEDULED
Status: DISCONTINUED | OUTPATIENT
Start: 2020-07-06 | End: 2020-07-10 | Stop reason: HOSPADM

## 2020-07-06 RX ORDER — CHLORAL HYDRATE 500 MG
1000 CAPSULE ORAL
COMMUNITY

## 2020-07-06 RX ORDER — NITROGLYCERIN 0.4 MG/1
0.4 TABLET SUBLINGUAL
COMMUNITY

## 2020-07-06 RX ORDER — MORPHINE SULFATE 2 MG/ML
2 INJECTION, SOLUTION INTRAMUSCULAR; INTRAVENOUS EVERY 4 HOURS PRN
Status: DISCONTINUED | OUTPATIENT
Start: 2020-07-06 | End: 2020-07-10 | Stop reason: HOSPADM

## 2020-07-06 RX ORDER — HYDROCODONE BITARTRATE AND ACETAMINOPHEN 5; 325 MG/1; MG/1
1 TABLET ORAL EVERY 6 HOURS PRN
Status: DISCONTINUED | OUTPATIENT
Start: 2020-07-06 | End: 2020-07-10 | Stop reason: HOSPADM

## 2020-07-06 RX ORDER — CARBAMAZEPINE 100 MG/1
100 TABLET, EXTENDED RELEASE ORAL NIGHTLY
Status: DISCONTINUED | OUTPATIENT
Start: 2020-07-06 | End: 2020-07-06

## 2020-07-06 RX ORDER — ISOSORBIDE MONONITRATE 30 MG/1
30 TABLET, EXTENDED RELEASE ORAL
Status: DISCONTINUED | OUTPATIENT
Start: 2020-07-06 | End: 2020-07-10 | Stop reason: HOSPADM

## 2020-07-06 RX ORDER — GUAIFENESIN 600 MG/1
600 TABLET, EXTENDED RELEASE ORAL 2 TIMES DAILY
COMMUNITY

## 2020-07-06 RX ORDER — SACCHAROMYCES BOULARDII 250 MG
250 CAPSULE ORAL DAILY
COMMUNITY

## 2020-07-06 RX ORDER — VITAMIN E 268 MG
400 CAPSULE ORAL DAILY
COMMUNITY

## 2020-07-06 RX ORDER — AMITRIPTYLINE HYDROCHLORIDE 10 MG/1
10 TABLET, FILM COATED ORAL NIGHTLY
Status: DISCONTINUED | OUTPATIENT
Start: 2020-07-06 | End: 2020-07-10 | Stop reason: HOSPADM

## 2020-07-06 RX ORDER — LORATADINE 10 MG/1
10 TABLET ORAL DAILY
COMMUNITY

## 2020-07-06 RX ORDER — CYCLOBENZAPRINE HCL 10 MG
10 TABLET ORAL 3 TIMES DAILY PRN
Status: DISCONTINUED | OUTPATIENT
Start: 2020-07-06 | End: 2020-07-10 | Stop reason: HOSPADM

## 2020-07-06 RX ORDER — PRAMIPEXOLE DIHYDROCHLORIDE 0.5 MG/1
0.5 TABLET ORAL
COMMUNITY

## 2020-07-06 RX ORDER — TROLAMINE SALICYLATE 10 G/100G
CREAM TOPICAL 4 TIMES DAILY PRN
Status: DISCONTINUED | OUTPATIENT
Start: 2020-07-06 | End: 2020-07-10 | Stop reason: HOSPADM

## 2020-07-06 RX ORDER — CYCLOBENZAPRINE HCL 10 MG
10 TABLET ORAL
COMMUNITY

## 2020-07-06 RX ORDER — CARVEDILOL 12.5 MG/1
12.5 TABLET ORAL 2 TIMES DAILY WITH MEALS
COMMUNITY

## 2020-07-06 RX ADMIN — HYDROCODONE BITARTRATE AND ACETAMINOPHEN 1 TABLET: 5; 325 TABLET ORAL at 15:56

## 2020-07-06 RX ADMIN — ISOSORBIDE MONONITRATE 30 MG: 30 TABLET, EXTENDED RELEASE ORAL at 15:56

## 2020-07-06 RX ADMIN — INSULIN LISPRO 4 UNITS: 100 INJECTION, SOLUTION INTRAVENOUS; SUBCUTANEOUS at 08:45

## 2020-07-06 RX ADMIN — HYDROCODONE BITARTRATE AND ACETAMINOPHEN 1 TABLET: 5; 325 TABLET ORAL at 05:57

## 2020-07-06 RX ADMIN — MORPHINE SULFATE 2 MG: 2 INJECTION, SOLUTION INTRAMUSCULAR; INTRAVENOUS at 13:10

## 2020-07-06 RX ADMIN — AMITRIPTYLINE HYDROCHLORIDE 10 MG: 10 TABLET, FILM COATED ORAL at 20:47

## 2020-07-06 RX ADMIN — ACETAMINOPHEN 650 MG: 325 TABLET, FILM COATED ORAL at 10:19

## 2020-07-06 RX ADMIN — IOPAMIDOL 95 ML: 755 INJECTION, SOLUTION INTRAVENOUS at 15:30

## 2020-07-06 RX ADMIN — FERROUS SULFATE TAB 325 MG (65 MG ELEMENTAL FE) 325 MG: 325 (65 FE) TAB at 08:45

## 2020-07-06 RX ADMIN — FLUTICASONE PROPIONATE 2 SPRAY: 50 SPRAY, METERED NASAL at 10:19

## 2020-07-06 RX ADMIN — FAMOTIDINE 40 MG: 20 TABLET, FILM COATED ORAL at 08:44

## 2020-07-06 RX ADMIN — LEVOTHYROXINE SODIUM 50 MCG: 50 TABLET ORAL at 05:57

## 2020-07-06 RX ADMIN — INSULIN LISPRO 5 UNITS: 100 INJECTION, SOLUTION INTRAVENOUS; SUBCUTANEOUS at 11:56

## 2020-07-06 RX ADMIN — TROLAMINE SALICYLATE: 100 CREAM TOPICAL at 23:56

## 2020-07-06 RX ADMIN — CARVEDILOL 12.5 MG: 12.5 TABLET, FILM COATED ORAL at 08:45

## 2020-07-06 RX ADMIN — ATORVASTATIN CALCIUM 80 MG: 40 TABLET, FILM COATED ORAL at 20:43

## 2020-07-06 RX ADMIN — INSULIN DETEMIR 10 UNITS: 100 INJECTION, SOLUTION SUBCUTANEOUS at 20:49

## 2020-07-06 RX ADMIN — HEPARIN SODIUM 13 UNITS/KG/HR: 10000 INJECTION, SOLUTION INTRAVENOUS at 05:57

## 2020-07-06 RX ADMIN — SODIUM CHLORIDE, PRESERVATIVE FREE 10 ML: 5 INJECTION INTRAVENOUS at 08:46

## 2020-07-06 RX ADMIN — LORAZEPAM 1 MG: 1 TABLET ORAL at 21:52

## 2020-07-06 RX ADMIN — INSULIN LISPRO 3 UNITS: 100 INJECTION, SOLUTION INTRAVENOUS; SUBCUTANEOUS at 16:54

## 2020-07-06 RX ADMIN — CASTOR OIL AND BALSAM, PERU: 788; 87 OINTMENT TOPICAL at 20:48

## 2020-07-06 RX ADMIN — ASPIRIN 325 MG ORAL TABLET 325 MG: 325 PILL ORAL at 08:44

## 2020-07-06 RX ADMIN — CEFTRIAXONE SODIUM 1 G: 1 INJECTION, POWDER, FOR SOLUTION INTRAMUSCULAR; INTRAVENOUS at 20:43

## 2020-07-06 RX ADMIN — APIXABAN 5 MG: 5 TABLET, FILM COATED ORAL at 15:46

## 2020-07-06 RX ADMIN — CASTOR OIL AND BALSAM, PERU: 788; 87 OINTMENT TOPICAL at 15:56

## 2020-07-06 RX ADMIN — CARVEDILOL 12.5 MG: 12.5 TABLET, FILM COATED ORAL at 16:54

## 2020-07-06 RX ADMIN — HYDROCODONE BITARTRATE AND ACETAMINOPHEN 1 TABLET: 5; 325 TABLET ORAL at 23:56

## 2020-07-06 RX ADMIN — CYCLOBENZAPRINE HYDROCHLORIDE 10 MG: 10 TABLET, FILM COATED ORAL at 20:43

## 2020-07-06 RX ADMIN — MORPHINE SULFATE 2 MG: 2 INJECTION, SOLUTION INTRAMUSCULAR; INTRAVENOUS at 21:53

## 2020-07-06 RX ADMIN — ONDANSETRON 4 MG: 2 INJECTION INTRAMUSCULAR; INTRAVENOUS at 13:10

## 2020-07-06 RX ADMIN — DULOXETINE HYDROCHLORIDE 60 MG: 60 CAPSULE, DELAYED RELEASE ORAL at 20:43

## 2020-07-06 RX ADMIN — SODIUM CHLORIDE, PRESERVATIVE FREE 10 ML: 5 INJECTION INTRAVENOUS at 20:44

## 2020-07-06 NOTE — NURSING NOTE
Pt a/o x4. NIH 4. Neurovascular checks preformed. Heparin gtt infusing @ 13u/kg/hr. Pt monitored closely. Prolapsed bladder noted. In-out cath done pt retaining 700ml of urine.

## 2020-07-06 NOTE — PROGRESS NOTES
Continued Stay Note  [facility]     Patient Name: Steffen Zeng  MRN: [id number]  Today's Date: 7/6/2020    Admit Date: 7/4/2020    Discharge Plan     Row Name 07/06/20 1231       Plan    Plan  [facility]    Provided Post Acute Provider List?  Yes    Post Acute Provider List  Inpatient Rehab    Provided Post Acute Provider Quality & Resource List?  Yes    Post Acute Provider Quality and Resource List  Inpatient Rehab    Delivered To  Patient    Method of Delivery  In person    Patient/Family in Agreement with Plan  yes    Plan Comments  Spopke with patient at [facility] (daughter) at bedside. They would like rehab at [facility]. CM called Karla at [facility] and made referral and faxed report. CM will continue to follow.    Final Discharge Disposition Code  03 - skilled nursing facility (SNF)        Discharge Codes    No documentation.             Kota Mayers RN

## 2020-07-06 NOTE — PLAN OF CARE
Problem: Patient Care Overview  Goal: Plan of Care Review  Outcome: Ongoing (interventions implemented as appropriate)  Flowsheets (Taken 7/6/2020 1123)  Consent Given to Review Plan with: OT evaluation completed.  Pt. mod of 1-2 with all bed mobility and transfer up to BSC.  Pt. able to perform grooming SBA, but max A with higher level ADL task.  Decreased standing balance and limited core strength.  Pt. appropriate for skilled OT services to promote return to PLOF.  Recommend SNF at discharge.  Plan of Care Reviewed With: patient  Patient Agreement with Plan of Care: agrees

## 2020-07-06 NOTE — CONSULTS
Inpatient Cardiology Consult  Consult performed by: Marie Maria APRN  Consult ordered by: Anna Castillo DO          [facility] at [facility]  Cardiovascular Consultation Note    Reason for consult DVT     Patient is an 79-year-old female with a history of prior CVA, diabetes mellitus, coronary artery disease status post PCI, hypertension, hyperlipidemia, and heart failure that was transferred from [facility] in [place], [place] on 7/5/2020 for concern of possible CVA.  The patient's daughter found her on the toilet slumped over, pale and diaphoretic with decreased level of consciousness.  Although her mother was conscious she was unable to answer any questions.  She was also noted to have left-sided weakness and facial droop.  Work-up at outside facility showed elevated troponin at 0.48,  EKG with 1 mm ST depression in V5-V6 with sinus tachycardia and leukocytosis.  She was treated with full strength aspirin, IV Rocephin, erythromycin and heparin drip initiated.  No IV TPA was given as patient was outside of window for last known well.  Neurology has evaluated and CT of the head/neck were unremarkable and MRI did not reveal an acute CVA.  The patient underwent a venous duplex of her lower extremities due to left leg edema and pain.  Results showed an extensive DVT of the left leg for which we have been asked to consult for consideration of a mechanical thrombectomy and/or catheter infused TPA.     The patient has some memory difficulties but her daughter is present at bedside.  The patient apparently had an MI back in 2016 and a cardiac cath showed multivessel disease.  She was referred to CT surgery for consideration of coronary artery bypass grafting.  According to the daughter a CT surgeon did evaluate and reported the patient would be high risk for CABG.  The patient and her family made the decision to proceed with multivessel PCI and medical management.  The  daughter reports the patient was a nonresponder to Plavix and was maintained on aspirin and Brilinta for quite some time.  The patient reports since her PCI several years ago she has always experienced occasional angina a few times a year that resolves with 1 sublingual nitroglycerin. Over the past 3 months she reports progressive angina and almost weekly use of sublingual nitroglycerin. She reports it now takes 2 nitroglycerin to relieve her chest discomfort.  Troponins here x2 have been elevated but flat at 0.447 and 0.411.  EKG shows sinus rhythm with first-degree AV block but no ST changes concerning for acute ischemia.    Cardiac risk factors: Advanced age, known CAD, hypertension, hyperlipidemia, uncontrolled diabetes mellitus    Past medical and surgical history, social and family history reviewed in EMR.    REVIEW OF SYSTEMS:   H&P ROS reviewed and pertinent CV ROS as noted in HPI.         Vital Sign Min/Max for last 24 hours  Temp  Min: 97.3 °F (36.3 °C)  Max: 98.1 °F (36.7 °C)   BP  Min: 158/74  Max: 178/86   Pulse  Min: 78  Max: 92   Resp  Min: 17  Max: 19   SpO2  Min: 94 %  Max: 96 %   No data recorded      Intake/Output Summary (Last 24 hours) at 7/6/2020 1531  Last data filed at 7/6/2020 0235  Gross per 24 hour   Intake --   Output 700 ml   Net -700 ml           Physical Exam   Constitutional: She is oriented to person, place, and time. She appears well-developed and well-nourished.   HENT:   Head: Normocephalic and atraumatic.   Eyes: Conjunctivae are normal. No scleral icterus.   Neck: Normal range of motion. No JVD present. Carotid bruit is not present. No thyromegaly present.   Cardiovascular: Normal rate and regular rhythm. Exam reveals no gallop.   No murmur heard.  Pulmonary/Chest: Effort normal and breath sounds normal.   Abdominal: Soft. She exhibits no distension and no mass. There is no hepatosplenomegaly.   Musculoskeletal: She exhibits no edema.   Neurological: She is alert and oriented to  person, place, and time.   Skin: Skin is warm and dry. No rash noted.   Psychiatric: She has a normal mood and affect. Her behavior is normal.       EKG: Normal sinus rhythm first degree AV block 7/5/2020    Lab Review:   Labs reviewed in the electronic medical record.  Pertinent findings include:  Lab Results   Component Value Date    GLUCOSE 294 (H) 07/06/2020    BUN 17 07/06/2020    CREATININE 0.96 07/06/2020    EGFRIFNONA 56 (L) 07/06/2020    BCR 17.7 07/06/2020    K 3.5 07/06/2020    CO2 25.0 07/06/2020    CALCIUM 9.1 07/06/2020    ALBUMIN 3.20 (L) 07/05/2020    AST 60 (H) 07/05/2020    ALT 35 (H) 07/05/2020     Lab Results   Component Value Date    WBC 9.84 07/06/2020    HGB 12.1 07/06/2020    HCT 36.6 07/06/2020    MCV 92.7 07/06/2020     07/06/2020     Lab Results   Component Value Date    CHOL 312 (H) 07/05/2020    TRIG 176 (H) 07/05/2020    HDL 44 07/05/2020     (H) 07/05/2020     Results from last 7 days   Lab Units 07/05/20  0055   HEMOGLOBIN A1C % 8.20*  8.10*            Active Hospital Problems    Diagnosis   • **Left-sided weakness     · Transfer from [facility] for signs symptoms concerning of CVA on 7/4/2020  · CT of the head and neck (7/4/2020): Perfusion deficit in the left occipital region and some stenosis in the left PCA as well as vertebrals but no significant intracarotid stenosis  · MRI (7/5/2020): Unremarkable no acute intracranial disease     • History of CVA (cerebrovascular accident)   • CKD (chronic kidney disease) stage 3, GFR 30-59 ml/min (CMS/[facility])   • Demand ischemia (CMS/[facility])   • Uncontrolled type 2 diabetes mellitus (CMS/[facility])   • Acute DVT (deep venous thrombosis) (CMS/[facility])     · Lower extremity venous duplex (7/5/2020):  Acute left extremity DVT in the common femoral, proximal femoral, mid femoral, distal femoral, popliteal, posterior tibial, peroneal and gastrocnemius     • Acute hypoxemic respiratory failure (CMS/[facility])   • Coronary  artery disease involving native coronary artery of native heart without angina pectoris     · Cardiac cath for MI at [facility] (2016): Multivessel CAD.  Deemed not candidate for CABG.  Multiple PCI.  Data deficit  · Echo (7/5/2020): LVEF 51-55%.  Moderate TR.  RVSP 43 mmHg     • Chronic heart failure (CMS/HCC)     · Echo (7/5/2020): LVEF 51-55%.  Moderate TR.  RVSP 43 mmHg.     • Hyperlipidemia LDL goal <70     · High intensity statin therapy indicated given presence of CAD and history of CVA     • Essential hypertension   • Acute UTI (urinary tract infection)               Acute DVT of left lower extremity  · Recommend anticoagulation with IV heparin which is gold standard for treatment of DVTs. Reserve invasive therapies including mechanical thrombectomy and/or catheter infused TPA for DVT's that are refractory to anticoagulation therapy.  Please contact us if left leg edema and pain persist despite anticoagulation.  · Recommend transitioning IV heparin to p.o. Eliquis at discharge      TIA versus CVA  · CT angio of the head and neck does not indicate acute CVA  · MRI does not indicate acute CVA  · Currently on full strength aspirin and statin therapy    Demand ischemia  · Elevated troponins x2 are flat which is consistent with demand ischemia in the setting of TIA  · Patient reports progressive angina over the past 3 months requiring increased use of sublingual nitro   · Patient was deemed to be too high risk for CABG in 2016 by CT surgery and underwent multivessel PCI.  This reportedly was done in [place].  Data deficit.  Trying to obtain records  · Patient prefers medical management for her CAD and does not want invasive procedures or cardiac caths  · Start isosorbide 30 mg daily    Heart failure with preserved EF  · Appears compensated  · Echo shows normal LVEF    Hypertension  · Blood pressures not well controlled currently 158/74  · On Coreg 12.5 mg twice daily  · Consider addition of amlodipine  if needed to keep B/P less than 130/80    Hyperlipidemia  · Lipitor 80 mg daily  · LDL not at goal 233    Uncontrolled diabetes mellitus  · Hemoglobin A1c greater than 8.0  · Receiving subcu insulin  · Hospitalist managing on insulin    Leukocytosis  · History of recurrent UTI  · Receiving antibiotics and management per hospitalist    Stage III chronic kidney disease  · Creatinine 1.10        · Continue IV heparin as gold standard for treatment of DVTs.  Contact us if DVT refractory to anticoagulation and will consider invasive procedure such as thrombectomy and/or catheter infused TPA  · Medical management for angina and CAD.  Add isosorbide 30 mg daily.  Consider addition of amlodipine to maintain SBP less than 130/80  · Patient can follow-up with cardiologist in her area at discharge or with us in 4 weeks.      Marie Maria, APRN, CCRN

## 2020-07-06 NOTE — PROGRESS NOTES
Discharge Planning Assessment  [facility]     Patient Name: Steffen Zeng  MRN: [id number]  Today's Date: 7/6/2020    Admit Date: 7/4/2020    Discharge Needs Assessment     Row Name 07/06/20 0813       Living Environment    Lives With  alone    Current Living Arrangements  home/apartment/condo    Primary Care Provided by  self    Provides Primary Care For  no one    Family Caregiver if Needed  child(mainor), adult    Family Caregiver Names  Cher Velázquez (daughter) [phone]    Quality of Family Relationships  helpful;involved;supportive    Able to Return to Prior Arrangements  yes       Resource/Environmental Concerns    Transportation Concerns  car, none       Transition Planning    Patient/Family Anticipates Transition to  home with help/services;inpatient rehabilitation facility    Patient/Family Anticipated Services at Transition  none    Transportation Anticipated  family or friend will provide       Discharge Needs Assessment    Readmission Within the Last 30 Days  no previous admission in last 30 days    Concerns to be Addressed  denies needs/concerns at this time    Equipment Currently Used at Home  walker, rolling    Anticipated Changes Related to Illness  none    Equipment Needed After Discharge  none        Discharge Plan     Row Name 07/06/20 0814       Plan    Plan  Home vs Rehab    Patient/Family in Agreement with Plan  yes    Plan Comments  Spoke with patient at bedside. Lives alone in [place].. Contact is Cher Velázquez (daughter) [phone]. Is independent with ADL's, daughter helps. No problems with Medicare/[employer] insurance or medications. Has a rolling walker at home. Plan is home vs rehab in [place]. CM will continue to follow.     Final Discharge Disposition Code  30 - still a patient        Destination      Coordination has not been started for this encounter.      Durable Medical Equipment      Coordination has not been started for this encounter.      Dialysis/Infusion      Coordination  has not been started for this encounter.      Home Medical Care      Coordination has not been started for this encounter.      Therapy      Coordination has not been started for this encounter.      Community Resources      Coordination has not been started for this encounter.          Demographic Summary     Row Name 07/06/20 0812       General Information    Admission Type  inpatient    Arrived From  hospital    Referral Source  admission list    Reason for Consult  discharge planning    Preferred Language  English     Used During This Interaction  no       Contact Information    Permission Granted to Share Info With      Contact Information Obtained for      Contact Information Comments  PCP is MD to me        Functional Status     Row Name 07/06/20 0812       Functional Status    Usual Activity Tolerance  moderate    Current Activity Tolerance  moderate       Functional Status, IADL    Medications  independent    Meal Preparation  assistive person    Housekeeping  independent    Shopping  independent;assistive person       Mental Status    General Appearance WDL  WDL       Mental Status Summary    Recent Changes in Mental Status/Cognitive Functioning  no changes       Employment/    Employment Status  retired        Psychosocial    No documentation.       Abuse/Neglect    No documentation.       Legal    No documentation.       Substance Abuse    No documentation.       Patient Forms    No documentation.           Kota Mayers RN

## 2020-07-06 NOTE — NURSING NOTE
WOC consult for wounds to RLE, left lateral ankle and right gluteal.     RLE with open blister/ulceration-Xeroform and foam dressing in place.     Left lateral ankle with stage 2 pressure injury-xeroform and foam dressing in place. Concur with both of these treatments.     Right lateral heel with old healed pressure injury-nothing needed at this time.     Patient is having a lot of pain at present-blood clot in LE-per daughter has small open area on right gluteal-will order Venelex for affected area-may mix with Z guard.     Will order dolphin bed without low air loss topper for patient comfort.     WOC will continue to follow.

## 2020-07-06 NOTE — PROGRESS NOTES
HEPARIN INFUSION    Steffen Zeng is an 79 y.o. female receiving heparin infusion.     Therapy for (VTE/Cardiac): VTE  Patient Weight: 112 kg  Initial Bolus (Y/N): No - received at OSH on 7/4  Any Bolus (Y/N): Yes        Signs or Symptoms of Bleeding: none noted    VTE (PE/DVT)   Initial Bolus: 80 units/kg (Max 10,000 units)  Initial rate: 18 units/kg/hr (Max 1,500 units/hr)      (Anti-Xa) (IU/mL) Bolus Dose Stop Infusion Rate Change Repeat (Anti-Xa)     ? 0.19 80 units/kg 0 hrs Increase rate by 4 units/kg/hr 6 hrs      0.2 - 0.29 40 units/kg 0 hrs Increase rate by 2 units/kg/hr 6 hrs    0.3 - 0.7  0 0 hrs No change 6 hrs    0.71 - 0.99   0  0 hrs Decrease rate by 2 units/kg/hr 6 hrs      ? 1 0 Hold 1 hr Decrease rate by 3 units/kg/hr 6 hrs      Results from last 7 days   Lab Units 07/06/20  0903 07/05/20  1045 07/05/20  0513 07/05/20  0055   INR   --  1.12  --  1.16   HEMOGLOBIN g/dL 12.1  --  11.4* 12.3   HEMATOCRIT % 36.6  --  35.4 37.7   PLATELETS 10*3/mm3 153  --  138* 144        Date   Time   Anti-Xa Current Rate (Unit/kg/hr) Bolus   (Units) Rate Change   (Unit/kg/hr) New Rate (Unit/kg/hr) Next   Anti-Xa Comments  Pump Check Daily   7/5 1100 0.1 OFF -- -- 13.4 1700 Hep gtt started at OSH but was held upon admission here; d/w RN   7/5 1715 0.26 13.4 2500 +1.6 15 7/6 @ 0100 D/w [place]   7/6 0039 0.81 15 -- -2 13 0900 Discussed w/ nurse   7/6 0903 0.57 13 -- -- 13 1500 D/W Annika Alfonso, PharmD  Pharmacy Resident   7/6/2020 12:07

## 2020-07-06 NOTE — CONSULTS
"Diabetes Education  Assessment/Teaching    Patient Name:  Steffen Zeng  YOB: 1940  MRN: [id number]  Admit Date:  7/4/2020      Assessment Date:  7/6/2020    Most Recent Value   General Information    Referral From:  -- [stroke protocol]   Height  160 cm (63\")   Height Method  Stated   Weight  110 kg (242 lb)   Weight Method  Bed scale   Pregnancy Assessment   Diabetes History   What type of diabetes do you have?  Type 2   Length of Diabetes Diagnosis  6 - 10 years   Current DM knowledge  fair   Have you had diabetes education/teaching in the past?  yes   When and where was your diabetes education?  per PCP office   Do you test your blood sugar at home?  yes   Frequency of checks  \" when i can remeber too, maybe a few times a week\"   Who performs the test?  \"daughter or self\"   Have you had low blood sugar? (<70mg/dl)  no   Have you had high blood sugar? (>140mg/dl)  yes   How often do you have high blood sugar?  occasionally   How would you rate your diabetes control?  fair   How often do you check your feet?  never   Education Preferences   What areas of diabetes would you like to learn about?  avoiding high blood sugar, avoiding low blood sugar, testing my blood sugar at home, diet information   Nutrition Information   Assessment Topics   Healthy Eating - Assessment  Needs education   Being Active - Assessment  Needs education   Taking Medication - Assessment  Competent   Problem Solving - Assessment  Needs education   Reducing Risk - Assessment  Needs education   Healthy Coping - Assessment  Needs education   Monitoring - Assessment  Needs education   DM Goals            Most Recent Value   DM Education Needs   Meter  Has own   Frequency of Testing  AC/HS   Blood Glucose Target Range  educated on blood glucose ranges fasting nad post prandial   Medication  Oral   Problem Solving  Hypoglycemia, Hyperglycemia, Sick days, Signs, Symptoms, Treatment   Reducing Risks  A1C testing   Physical Activity  " "None   Physical Activity Frequency  Never   Motivation  Moderate   Teaching Method  Explanation, Handouts   Patient Response  Verbalized understanding, Needs reinforcement            Other Comments:  Patient consents to diabetes education and allows for education in front of daughter whose at bedside     Discussed and taught patient and daughter about type 2 diabetes self-management, risk factors, and importance of blood glucose control to reduce complications. Target blood glucose readings and A1c goals per ADA were reviewed. Reviewed with patient current A1c 8.2 and discussed its significance. Signs, symptoms, and treatment of hyperglycemia and hypoglycemia were discussed. Lifestyle changes such as physical activity with MD approval and healthy eating were encouraged. Stressed the importance of strict blood sugar control after surgery to prevent complications such as infection and to promote healing of incision. Encouraged pt to monitor blood sugar at home 1-2  times per day and to call PCP if blood sugar is trending terry. Encouraged to keep record of blood glucose readings to take to follow up appointment with PCP. Provided patient with copy of [employer]'s \"What is Diabetes\" handout, \"Blood Glucose Goals\" handout, and \"What is A1c\" handout. We reviewed the plate method as a way to build a healthy diet as well as carb counting and attempted to stick to a recomened 45-60g of carbs per meal. Will place nutrition consult for further aid in dietary education. Thank you for this referral.          Electronically signed by:  Juan Bey RN  07/06/20 12:19  "

## 2020-07-06 NOTE — PROGRESS NOTES
[facility] Medicine Services  PROGRESS NOTE    Patient Name: Steffen Zeng  : 1940  MRN: [id number]    Date of Admission: 2020  Primary Care Physician: Provider, No Known    Subjective   Subjective     CC:  Mental status change, LE DVT    HPI:  Complaining of some pain in her left leg, requiring assistance with some activities. Daughter at bedside has list of meds.     Review of Systems  Gen- No fevers, chills  CV- No chest pain, palpitations  Resp- No cough, dyspnea  GI- No N/V/D, abd pain    Objective   Objective     Vital Signs:   Temp:  [97.5 °F (36.4 °C)-98.1 °F (36.7 °C)] 97.5 °F (36.4 °C)  Heart Rate:  [78-92] 92  Resp:  [17-19] 18  BP: (162-178)/(58-86) 164/58  Total (NIH Stroke Scale): 2     Physical Exam:  Constitutional: No acute distress, awake, alert  HENT: NCAT, mucous membranes moist  Respiratory: Clear to auscultation bilaterally, respiratory effort normal   Cardiovascular: RRR, no murmur  Gastrointestinal: Positive bowel sounds, soft, nontender  Musculoskeletal: pitting edema, left greater than right  Psychiatric: Appropriate affect, cooperative  Neurologic: Oriented to self, speech clear  Skin: No rashes    Results Reviewed:  Results from last 7 days   Lab Units 20  0920  1045 20  0520  0055   WBC 10*3/mm3 9.84  --  12.87* 15.43*   HEMOGLOBIN g/dL 12.1  --  11.4* 12.3   HEMATOCRIT % 36.6  --  35.4 37.7   PLATELETS 10*3/mm3 153  --  138* 144   INR   --  1.12  --  1.16   PROCALCITONIN ng/mL  --   --   --  7.52*     Results from last 7 days   Lab Units 20  0903 20  0513 20  0055   SODIUM mmol/L 139 138 138   POTASSIUM mmol/L 3.5 3.2* 3.5   CHLORIDE mmol/L 102 101 99   CO2 mmol/L 25.0 23.0 25.0   BUN mg/dL 17 22 23   CREATININE mg/dL 0.96 1.10* 1.24*   GLUCOSE mg/dL 294* 284* 334*   CALCIUM mg/dL 9.1 8.8 8.8   ALT (SGPT) U/L  --  35* 37*   AST (SGOT) U/L  --  60* 61*   TROPONIN T ng/mL  --  0.411* 0.447*     Estimated  Creatinine Clearance: 55.6 mL/min (by C-G formula based on SCr of 0.96 mg/dL).    Microbiology Results Abnormal     Procedure Component Value - Date/Time    Urine Culture - Urine, Urine, Clean Catch [[id number]]  (Normal) Collected:  07/05/20 0723    Lab Status:  Final result Specimen:  Urine, Clean Catch Updated:  07/06/20 1030     Urine Culture No growth    COVID PRE-OP / PRE-PROCEDURE SCREENING ORDER (NO ISOLATION) - Swab, Nasopharynx [[id number]] Collected:  07/05/20 0137    Lab Status:  Final result Specimen:  Swab from Nasopharynx Updated:  07/05/20 0249    Narrative:       The following orders were created for panel order COVID PRE-OP / PRE-PROCEDURE SCREENING ORDER (NO ISOLATION) - Swab, Nasopharynx.  Procedure                               Abnormality         Status                     ---------                               -----------         ------                     COVID-19,CEPHEID,MYA IN-...[[id number]]  Normal              Final result                 Please view results for these tests on the individual orders.    COVID-19,CEPHEID,MYA IN-HOUSE(OR EMERGENT/ADD-ON),NP SWAB IN TRANSPORT MEDIA 3-4 HR TAT - Swab, Nasopharynx [[id number]]  (Normal) Collected:  07/05/20 0137    Lab Status:  Final result Specimen:  Swab from Nasopharynx Updated:  07/05/20 0249     COVID19 Not Detected    Narrative:       Fact sheet for providers: https://www.fda.gov/media/883299/download     Fact sheet for patients: https://www.fda.gov/media/166059/download          Imaging Results (Last 24 Hours)     Procedure Component Value Units Date/Time    MRI Brain Without Contrast [[id number]] Collected:  07/05/20 1346     Updated:  07/05/20 2251    Narrative:       EXAMINATION: MRI BRAIN WO CONTRAST - 07/05/2020     INDICATION: Left-sided weakness. Evaluate for stroke.     TECHNIQUE: Sagittal and axial T1 and axial T2 FLAIR and  diffusion-weighted images of the brain, axial T2 Flash images.     COMPARISON: Head CT scan and cerebral  perfusion exam of 07/04/2020     FINDINGS: Perfusion study report indicates approximately 10 mL volume  area of ischemia in the left occipital lobe by rapid analysis. Today's  study shows no evidence of restricted diffusion to suggest acute  infarction the left occipital lobe or elsewhere. Remaining sequences  show a small focus of encephalomalacia in the medial left temporal lobe  and perhaps trace amount of gliosis in the same location of the right  occipital lobe. There is age-appropriate generalized cerebral atrophy  elsewhere. There is relatively mild nonconfluent central white matter  disease. There are no signal changes suggestive of hemorrhage, no  evidence of mass, mass effect, hydrocephalus, or abnormal extra-axial  collection. Sagittal images show the midline structures to appear  grossly normal. Anatomy of the craniocervical junction appears normal.  There is expected flow signal in the major intracranial arteries and in  the median sagittal sinus. There appears to be chronic opacification  left maxillary sinus. Remaining paranasal sinuses appear grossly clear.       Impression:       1. Small focus of encephalomalacia in the left occipital lobe.. No  evidence of acute infarction here or elsewhere.  2. Chronic-appearing changes of the aging brain. No evidence of acute  intracranial disease.     DICTATED:   07/05/2020  EDITED/ls :   07/05/2020      This report was finalized on 7/5/2020 10:48 PM by Dr. Matt Malave MD.             Results for orders placed during the hospital encounter of 07/04/20   Adult Transthoracic Echo Complete W/ Cont if Necessary Per Protocol    Narrative · Cardiac chamber sizes and wall thicknesses are normal. All wall   thicknesses are normal.  · Left ventricular systolic function appears to be at the lower limits of   normal to slightly depressed with an estimated ejection fraction of 51% -   55%.  · The trileaflet aortic valve has some degree of calcific changes.   Significant  aortic stenosis/aortic insufficiency is not seen.  · The mitral valve is normal in structure and function.  · There is moderate tricuspid regurgitation with an estimated right   ventricular systolic pressure of 43 mmHg, sugesting moderately severe PA   hypertension.  · An agitated saline study reveals no evidence of intracardiac shunting.  · There are no other important findings on this study.          I have reviewed the medications:  Scheduled Meds:  aspirin 325 mg Oral Daily   Or      aspirin 300 mg Rectal Daily   atorvastatin 80 mg Oral Nightly   carvedilol 12.5 mg Oral BID With Meals   cefTRIAXone 1 g Intravenous Q24H   famotidine 40 mg Oral Daily   ferrous sulfate 325 mg Oral Daily With Breakfast   fluticasone 2 spray Each Nare Daily   insulin detemir 10 Units Subcutaneous Nightly   insulin lispro 0-7 Units Subcutaneous TID AC   levothyroxine 50 mcg Oral Daily   sodium chloride 10 mL Intravenous Q12H   sodium chloride 10 mL Intravenous Q12H     Continuous Infusions:  heparin 13 Units/kg/hr Last Rate: 13 Units/kg/hr (07/06/20 0557)   Pharmacy to Dose Heparin       PRN Meds:.•  acetaminophen **OR** acetaminophen **OR** acetaminophen  •  acetaminophen  •  dextrose  •  dextrose  •  docusate sodium  •  glucagon (human recombinant)  •  HYDROcodone-acetaminophen  •  ipratropium-albuterol  •  ondansetron **OR** ondansetron  •  Pharmacy to Dose Heparin  •  sodium chloride  •  sodium chloride    Assessment/Plan   Assessment & Plan     Active Hospital Problems    Diagnosis  POA   • **Left-sided weakness [R53.1]  Yes   • HTN (hypertension) [I10]  Yes   • CAD (coronary artery disease) [I25.10]  Yes   • Systolic CHF, chronic (CMS/[facility]) [I50.22]  Yes   • DM2 (diabetes mellitus, type 2) (CMS/[facility]) [E11.9]  Yes   • Acute UTI (urinary tract infection) [N39.0]  Yes   • Acute DVT (deep venous thrombosis) (CMS/[facility]) [I82.409]  Yes   • Acute hypoxemic respiratory failure (CMS/[facility]) [J96.01]  Yes      Resolved Hospital Problems   No  resolved problems to display.        Brief Hospital Course to date:  Steffen Zeng is a 79 y.o. female 79 y.o. female, presented to our hospital for stroke work-up. MRI negative however LE duplex positive for significant LE DVT    Acute onset of left-sided weakness-  CT perfusion-positive for ischemia changes in the left occipital lobe without any visualized cord infarct.  CT angiogram head and neck without significant stenosis  CT perfusion shows 10 cc area of ischemic change in the left occipital lobe without visualized core infarct  MRI shows small focus of encephalomalacia in the left lobe with no evidence of acute infarction    Leukocytosis  - history of recurrent UTI  -Continue Rocephin  -Await urine culture    CKD stage III  -Baseline creatinine    History of heart failure  -Most recent echo shows EF 50 to 55% with no cardiac shunt  -We will order oral diuretics once home meds are in  -Check CTA chest to rule out PE    LLE DVT  -Continue on heparin drip  -As cardiology to eval regarding intracatheter TPA  -Spoke with Dr. Owens who says cardiology is able to do this  -Continue heparin drip, will need transition to oral anticoagulation prior to DC       Chronic severe back pain/spinal stenosis/neuropathy  - Restart home meds once updated list available    DVT Prophylaxis: On heparin drip  Discussed with daughter at bedside  Disposition: I expect the patient to be discharged TBD - will need rehab    CODE STATUS:   Code Status and Medical Interventions:   Ordered at: 07/05/20 0205     Limited Support to NOT Include:    Cardioversion/Defibrillation    Intubation     Level Of Support Discussed With:    Next of Kin (If No Surrogate)     Code Status:    No CPR     Medical Interventions (Level of Support Prior to Arrest):    Limited     Comments:    Discussed with daughter at length, patient never wished to be on the ventilator or wanted CPR.         Electronically signed by Anna Castillo DO, 07/06/20, 12:06.

## 2020-07-06 NOTE — DISCHARGE PLACEMENT REQUEST
"Steffen Zeng (79 y.o. Female)   Carlos Mayers, RN Case Manager  [phone]    Date of Birth Social Security Number Address Home Phone MRN    1940  [address] [place] [address] [phone] [id number]    [employer] Marital Status          [employer]        Admission Date Admission Type Admitting Provider Attending Provider Department, Room/Bed    7/4/20 Urgent Anna Castillo, Anna Vincent,  [facility] 3F, S314/1    Discharge Date Discharge Disposition Discharge Destination                       Attending Provider:  Anna Castillo DO    Allergies:  Not on File    Isolation:  None   Infection:  None   Code Status:  No CPR    Ht:  160 cm (63\")   Wt:  110 kg (242 lb)    Admission Cmt:  None   Principal Problem:  Left-sided weakness [R53.1]                 Active Insurance as of 7/4/2020     Primary Coverage     Payor Plan Insurance Group Employer/Plan Group    MEDICARE MEDICARE A & B      Payor Plan Address Payor Plan Phone Number Payor Plan Fax Number Effective Dates    [address] [phone]  10/1/2004 - None Entered    [facility] [id number]       Subscriber Name Subscriber Birth Date Member ID       STEFFEN ZENG 1940 [id number]           Secondary Coverage     Payor Plan Insurance Group Employer/Plan Group    [facility] SUPP [id number]     Payor Plan Address Payor Plan Phone Number Payor Plan Fax Number Effective Dates    [address]   12/1/2016 - None Entered    [facility] [id number]       Subscriber Name Subscriber Birth Date Member ID       STEFFEN ZENG 1940 [id number]                 Emergency Contacts      (Rel.) Home Phone Work Phone Mobile Phone    Cher Velázquez (Daughter) [phone] -- --    Payal Luu (Sister) [phone] -- --               History & Physical      aRdha Reed MD at 07/05/20 0046              [facility] Medicine Services  HISTORY AND PHYSICAL    Patient Name: Steffen " Karri  : 1940  MRN: [id number]  Primary Care Physician: Provider, No Known  Date of admission: 2020      Subjective   Subjective     Chief Complaint:  Transferred from [facility] for stroke evaluation, patient moderate historian.  History obtained from the chart    HPI:  Steffen Zeng is a 79 y.o. female, presented to our hospital for stroke work-up.  As per records at 5 PM on 2020-daughter found patient slumped over on toilet, pale, very diaphoretic, had no strength in her extremities, awake but not answering questions-the last time she was known well was at 8:30 AM in the morning.  Patient was noted to have left-sided weakness- his NIH stroke scale-was reported as 9- for dysarthria, left upper/lower extremity weakness, facial droop.    On ED work-up patient was also found to have leukocytosis, troponin-0.48, EKG was reported as 1 mm ST depression in V5-V6 sinus tachycardia-currently there are no records available to review.    Patient herself complains chiefly of back pain currently, does not complain of headache, do not complain of any chest pain, or abdominal pain.  No acute illness is reported by patient.  Denies any complaint of cough, does complain of exertional dyspnea.  Patient cannot tell us whether she takes aspirin or Plavix at home, but does state that she has stroke affecting her left upper extremity in the past.    At the external ED patient was given aspirin 325 per rectum, Rocephin, erythromycin and heparin drip was initiated, normal saline at 500.    Review of Systems   Complete ROS done, which is negative except as above and HPI.  Old records reviewed and summarized in PM hx    Past medical history  DM 2  COPD  CHF  Hypertension  CAD- with history of MI in 2016  Morbidly obese  ?  History of CVA-affecting right upper extremity      Past surgical history- hysterectomy, stent placement  P hysterectomy, stent as past medical history past medical history t medical history  Past  medical historcal Histor  Family History: Patient could not give any detailed history.    Social History:  Currently non-smoker and no alcohol use.    Social History     Social History Narrative   • Not on file       Medications:  Available home medication information reviewed.  No medications prior to admission.       Not on File    Objective   Objective     Vital Signs:   Temp:  [96.5 °F (35.8 °C)] 96.5 °F (35.8 °C)  Heart Rate:  [86] 86  Resp:  [18] 18  BP: (132)/(83) 132/83   Total (NIH Stroke Scale): 8    Physical Exam     GENERAL-obese, not in distress  RS-decreased air entry anteriorly  CVS- s1s2 Regular, no murmur.  ABD-distended bowel sounds positive, nontender  EXT-2+ edema  NEURO- AAO-place, person not so much to time, speech is slow- but clear, finger  equal in both hands, left upper/lower extremity slightly weaker than the right, no obvious sensory deficit could be elicited, Babinski equivocal both extremity, deep tendon reflexes 2+,  EYES-extraocular movement intact, visual field could not be tested conjunctivae are normal. Pupils are equal, round, and reactive to light. No scleral icterus.   ENT- no external ear nose lesions, mucosa moist.  NECK- No JVD present. No tracheal deviation present. No thyromegaly present,No cervical lymphadenopathy.  JOINTS/MSK- no deformity, no swelling.  SKIN- no rash , warm to touch.  PSYCHIATRIC- Normal mood and affect. Behavior is normal. Thought content normal.     Results Reviewed:  I have personally reviewed current lab and radiology data.              Invalid input(s):  ALKPHOS  CrCl cannot be calculated (No successful lab value found.).  Brief Urine Lab Results     None        Imaging Results (Last 24 Hours)     Procedure Component Value Units Date/Time    CT Angiogram Neck [[id number]] Collected:  07/05/20 0017     Updated:  07/05/20 0019    Narrative:       CTA Neck, CTA Head    INDICATION:   Decreased responsiveness. Abnormal perfusion study showing ischemic  change in the left occipital lobe    TECHNIQUE:   CT angiogram of the head and neck with IV contrast. 3-D reconstructions were obtained and reviewed.  Evaluation for a significant carotid arterial stenosis is based on the NASCET criteria. Radiation dose reduction techniques included automated exposure  control or exposure modulation based on body size. Count of known CT and cardiac nuc med studies performed in previous 12 months: 0.     COMPARISON:   CT brain and CT perfusion study of the brain from today    FINDINGS:   CTA neck:  Lung apices clear. Thyroid gland is enlarged and has a low-density lesion in the right lobe measuring 2.6 cm in diameter. Submandibular and parotid glands are normal.    Vascular findings aortic arch is normal in size. Great vessels are all patent. The study slightly degraded by motion. It is difficult to see either vertebral artery in the lower neck. There is reconstitution of the more distal vertebral arteries and the  left one is dominant. 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 There are calcified plaques in each carotid bifurcation region without significant stenosis. The rest of the internal carotid arteries are patent.    CTA head:  The middle and anterior tissue arteries appear normal.. A right posterior cerebral artery appears be present but is a lot of venous opacification. Its difficult to clearly see the left posterior cerebral artery.      Impression:       Study is somewhat limited. There is poor evaluation of both posterior cerebral arteries. Probably the right one is present but is difficult see the proximal left PCA. It is also Also difficult see the proximal vertebral arteries in the neck.    No significant carotid stenosis is identified.    Signer Name: Franklin Bella MD   Signed: 7/5/2020 12:17 AM   Workstation Name: [facility]    [facility]    CT Angiogram Head [[id number]] Collected:  07/05/20 0017     Updated:  07/05/20 0019    Narrative:         CTA Neck, CTA Head    INDICATION:   Decreased responsiveness. Abnormal perfusion study showing ischemic change in the left occipital lobe    TECHNIQUE:   CT angiogram of the head and neck with IV contrast. 3-D reconstructions were obtained and reviewed.  Evaluation for a significant carotid arterial stenosis is based on the NASCET criteria. Radiation dose reduction techniques included automated exposure  control or exposure modulation based on body size. Count of known CT and cardiac nuc med studies performed in previous 12 months: 0.     COMPARISON:   CT brain and CT perfusion study of the brain from today    FINDINGS:   CTA neck:  Lung apices clear. Thyroid gland is enlarged and has a low-density lesion in the right lobe measuring 2.6 cm in diameter. Submandibular and parotid glands are normal.    Vascular findings aortic arch is normal in size. Great vessels are all patent. The study slightly degraded by motion. It is difficult to see either vertebral artery in the lower neck. There is reconstitution of the more distal vertebral arteries and the  left one is dominant. 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 There are calcified plaques in each carotid bifurcation region without significant stenosis. The rest of the internal carotid arteries are patent.    CTA head:  The middle and anterior tissue arteries appear normal.. A right posterior cerebral artery appears be present but is a lot of venous opacification. Its difficult to clearly see the left posterior cerebral artery.      Impression:       Study is somewhat limited. There is poor evaluation of both posterior cerebral arteries. Probably the right one is present but is difficult see the proximal left PCA. It is also Also difficult see the proximal vertebral arteries in the neck.    No significant carotid stenosis is identified.    Signer Name: Franklin Bella MD   Signed: 7/5/2020 12:17 AM   Workstation Name: [facility]    [facility]  [place]    CT Cerebral Perfusion With & Without Contrast [[id number]] Collected:  07/05/20 0004     Updated:  07/05/20 0006    Narrative:       CT CEREBRAL PERFUSION W WO CONTRAST    HISTORY:       TECHNIQUE:   Axial CT images of the brain without and with intravenous contrast using cerebral perfusion protocol. Post-processing parametric maps were created using RAPID software and reviewed. Radiation dose reduction techniques included automated exposure control  or exposure modulation based on body size. CT and nuclear cardiology exams in the last 12 months: 0.    COMPARISON:   CT scan earlier today    FINDINGS:   Arterial input function is optimal.     Ischemic tissue volume (Tmax > 6 seconds, includes core and penumbra): 10 cc in the left occipital lobe  Ischemic core volume (rCBF < 30%): 0  Mismatch (penumbra) volume 10 cc, ratio and then it  Volume of poor collateral perfusion (Tmax > 10 seconds): 0  Hypoperfusion index (Tmax > 10 seconds/Tmax > 6 seconds): 0  CBV index: 0.1      Impression:       There is a 10 cc area of ischemic change in the left occipital lobe without visualized core infarct.    I have given the results to the patient's nurse (Karly) by telephone at 12 4:00 AM          Signer Name: Franklin Bella MD   Signed: 7/5/2020 12:04 AM   Workstation Name: [facility]-    [facility] [place]    CT Abdomen Pelvis Without Contrast [[id number]] Collected:  07/04/20 2356     Updated:  07/04/20 2358    Narrative:       CT Abdomen Pelvis WO    INDICATION:   Abdominal pain. Decreased responsiveness    TECHNIQUE:   CT of the abdomen and pelvis without IV contrast. Coronal and sagittal reconstructions were obtained.  Radiation dose reduction techniques included automated exposure control or exposure modulation based on body size. Count of known CT and cardiac nuc  med studies performed in previous 12 months: 0.     COMPARISON:   None available.    FINDINGS:  Abdomen: The gallbladder is been  removed. The liver, spleen, pancreas, adrenal glands and kidneys are normal in appearance. Aorta is normal in size. There is no adenopathy. Bowel is normal except for a few sigmoid diverticuli.    Pelvis: Uterus is been removed. There are no adnexal masses. The bladder is normal except for focal area of benign-appearing calcification so she with posterior wall. This is 12 mm in diameter.      Impression:       Negative CT of the abdomen and pelvis.          Signer Name: Franklin Bella MD   Signed: 7/4/2020 11:56 PM   Workstation Name: [facility]    CT Chest Without Contrast [[id number]] Collected:  07/04/20 2353     Updated:  07/04/20 2355    Narrative:       CT Chest WO    INDICATION:   Shortness of air tonight    TECHNIQUE:   CT of the thorax without IV contrast. Coronal and sagittal reconstructions were obtained.  Radiation dose reduction techniques included automated exposure control or exposure modulation based on body size. Count of known CT and cardiac nuc med studies  performed in previous 12 months: 0.     COMPARISON:   None available.    FINDINGS:  There is minimal basilar atelectasis otherwise the lungs are clear. The airways are patent. The thyroid tissue is enlarged bilaterally. The aorta is normal in size. There is no adenopathy. Bones are unremarkable.      Impression:       Minimal bibasilar atelectasis. Otherwise negative CT chest    Signer Name: Franklin Bella MD   Signed: 7/4/2020 11:53 PM   Workstation Name: [facility]    CT Head Without Contrast Stroke Protocol [[id number]] Collected:  07/04/20 2350     Updated:  07/04/20 2352    Narrative:       CT Head WO Code Stroke    HISTORY:   Decreased responsiveness tonight    TECHNIQUE:   Axial unenhanced head CT. Radiation dose reduction techniques included automated exposure control or exposure modulation based on body size. Count of known CT and cardiac nuc med studies performed in  previous 12 months: 0.     Time of scan: 11:40 PM    COMPARISON:   None.    FINDINGS:   No intracranial hemorrhage, mass, or infarct. No hydrocephalus or extra-axial fluid collection. Brain parenchymal density is normal. The skull base, calvarium, and extracranial soft tissues are normal.      Impression:       Normal, negative unenhanced head CT.          Signer Name: Frankiln Bella MD   Signed: 7/4/2020 11:50 PM   Workstation Name: [facility]-    [facility] [place]             Ekg-  Cxr-    Assessment/Plan   Active Hospital Problems    Diagnosis POA   • Acute hypoxemic respiratory failure (CMS/HCC) [J96.01] Yes       Assessment & Plan   Acute onset of left-sided weakness-  CT perfusion-positive for ischemia changes in the left occipital lobe without any visualized cord infarct.  CT angiogram head and neck- limited study-please see the official report.  -Patient already seen by stroke navigator, and received aspirin 325 external ED, high-dose statin initiated.  - We will try to call the daughter.  (Need updated medication list).    Leukocytosis- history of recurrent UTI, already started on Rocephin will continue that.  -UA still pending please follow-up.    Troponin- 0.48, 0.44-no acute ST changes on EKG, no complaint of chest pain-we will continue to monitor.    CKD-monitor the creatinine.  BUN/creatinine 23/1.2    DM 2-current blood glucose is 329, will start with fingerstick coverage, currently patient n.p.o.    COPD?-initially patient was on 40% FiO2, not in any acute respiratory distress, once the patient settled down she was noted to be on 97% on 2 L nasal cannula.  - Patient had a rapid COVID testing done at the external ED which was negative, considering she was hypoxic initially, patient is again retested for COVID-19.    CHF/chronic pedal edema-on diuretics-Lasix, dose unknown-patient does appear to have pedal edema, home meds unknown, proBNP sent, echocardiogram in a.m., currently patient is  not overtly hypoxic-once we have more data- can start her on her home meds.  CT chest-minimal bibasilar atelectasis.    Hypertension-will allow permissive hypertension secondary to acute CVA.  As per daughter-on losartan 50 mg p.o. every 12, Imdur 30 mg p.o. daily    CAD- with history of MI in 2016- patient is unsure about her home medications, troponin elevated at the external ED at 0.4, EKG-sinus rhythm at 84 bpm, first-degree AV block, , Q waves in lead III and V1 and V2 no acute ST-T wave changes noted.  -Recently was seen by cardiologist in [place], offered her stress test which she declined.  -Follow-up troponin trend, currently holding the heparin drip until we get more results.    Morbidly obese- monitor for hypoxia during her sleep.    ?  History of CVA-affecting left upper extremity- which has completely resolved-currently on aspirin 81 mg.    Chronic severe back pain/spinal stenosis/neuropathy-on Lyrica, hydrocodone-q 6 hrly.-  Which family thinks needs to be restarted as soon as possible-given her history of almost.  Withdrawal symptoms in the past.    History of recurrent UTI-recently treated inpatient.    DVT prophylaxis:    -TEDs/SCDs  -Lovenox    CODE STATUS:    Code Status and Medical Interventions:   Ordered at: 07/04/20 0099     Level Of Support Discussed With:    Patient     Code Status:    CPR     Medical Interventions (Level of Support Prior to Arrest):    Full       Admission Communication  Due to current limited visitation policies, an attempt will be made to update patient's identified best point-of-contact(s) at admission to discuss plan of care.   Contact: shaggy Kwon   Relation:    Time of communication: phone call 2 am  -[phone]     Notes (if applicable):      Admission Status:  I believe this patient meets inpatient criteria secondary to need of work-up for stroke, altered mental status.    Electronically signed by Radha Reed MD, 07/05/20, 12:46  AM.      Electronically signed by Radha Reed MD at 07/05/20 0342       Vital Signs (last day)     Date/Time   Temp   Temp src   Pulse   Resp   BP   Patient Position   SpO2    07/06/20 0759   97.5 (36.4)   Oral   92   18   164/58   Lying   94    07/06/20 0608   --   --   82   --   --   --   96    07/06/20 0320   97.6 (36.4)   Oral   86   19   178/86   Lying   95    07/06/20 0009   97.9 (36.6)   Oral   78   17   167/79   Lying   --    07/05/20 2133   98.1 (36.7)   Oral   78   17   162/70   Lying   95    07/05/20 1110   --   --   67   18   140/69   Lying   97    07/05/20 0900   --   --   67   --   --   --   98    07/05/20 0708   97.7 (36.5)   Oral   64   16   103/55   Lying   96    07/05/20 0619   --   --   66   --   --   --   --    07/05/20 0400   96.9 (36.1)   Oral   67   18   115/61   Lying   96    07/05/20 0048   96.5 (35.8)   Oral   81   18   132/83   Lying   96    07/05/20 0037   96.5 (35.8)   Oral   86   18   132/83   Lying   99                Current Facility-Administered Medications   Medication Dose Route Frequency Provider Last Rate Last Dose   • acetaminophen (TYLENOL) tablet 650 mg  650 mg Oral Q4H PRN Radha Reed MD   650 mg at 07/06/20 1019    Or   • acetaminophen (TYLENOL) 160 MG/5ML solution 650 mg  650 mg Oral Q4H PRN Radha Reed MD        Or   • acetaminophen (TYLENOL) suppository 650 mg  650 mg Rectal Q4H PRN Radha Reed MD       • acetaminophen (TYLENOL) suppository 650 mg  650 mg Rectal Q4H PRN Radha Reed MD   650 mg at 07/05/20 0058   • aspirin tablet 325 mg  325 mg Oral Daily Blanca Tom APRN   325 mg at 07/06/20 0844    Or   • aspirin suppository 300 mg  300 mg Rectal Daily Tom, Blanca J, APRN       • atorvastatin (LIPITOR) tablet 80 mg  80 mg Oral Nightly Blanca Tom, KAIA   80 mg at 07/05/20 2014   • carvedilol (COREG) tablet 12.5 mg  12.5 mg Oral BID With Meals Radha Reed MD   12.5 mg at 07/06/20 0845   • cefTRIAXone (ROCEPHIN) 1 g/100  mL 0.9% NS (MBP)  1 g Intravenous Q24H Karthik Vazquez MD   1 g at 07/05/20 2014   • dextrose (D50W) 25 g/ 50mL Intravenous Solution 25 g  25 g Intravenous Q15 Min PRN Radha Reed MD       • dextrose (GLUTOSE) oral gel 15 g  15 g Oral Q15 Min PRN Radha Reed MD       • docusate sodium (COLACE) capsule 100 mg  100 mg Oral BID PRN Radha Reed MD       • famotidine (PEPCID) tablet 40 mg  40 mg Oral Daily Radha Reed MD   40 mg at 07/06/20 0844   • ferrous sulfate tablet 325 mg  325 mg Oral Daily With Breakfast Radha Reed MD   325 mg at 07/06/20 0845   • fluticasone (FLONASE) 50 MCG/ACT nasal spray 2 spray  2 spray Each Nare Daily Radha Reed MD   2 spray at 07/06/20 1019   • glucagon (human recombinant) (GLUCAGEN DIAGNOSTIC) injection 1 mg  1 mg Subcutaneous Q15 Min PRN Radha Reed MD       • heparin 32576 units/250 mL (100 units/mL) in 0.45 % NaCl infusion  13 Units/kg/hr Intravenous Titrated Sanchez Kothari, [facility] 14.56 mL/hr at 07/06/20 0557 13 Units/kg/hr at 07/06/20 0557   • HYDROcodone-acetaminophen (NORCO) 5-325 MG per tablet 1 tablet  1 tablet Oral Q8H PRN Radha Reed MD   1 tablet at 07/06/20 0557   • insulin detemir (LEVEMIR) injection 10 Units  10 Units Subcutaneous Nightly Anna Castillo, DO       • insulin lispro (humaLOG) injection 0-7 Units  0-7 Units Subcutaneous TID AC Radha Reed MD   4 Units at 07/06/20 0845   • ipratropium-albuterol (DUO-NEB) nebulizer solution 3 mL  3 mL Nebulization Q4H PRN Radha Reed MD       • levothyroxine (SYNTHROID, LEVOTHROID) tablet 50 mcg  50 mcg Oral Daily Radha Reed MD   50 mcg at 07/06/20 0557   • ondansetron (ZOFRAN) tablet 4 mg  4 mg Oral Q6H PRN Radha Reed MD        Or   • ondansetron (ZOFRAN) injection 4 mg  4 mg Intravenous Q6H PRN Radha Reed MD       • Pharmacy to Dose Heparin   Does not apply Continuous PRN Karthik Vazquez MD       • sodium chloride 0.9 % flush 10 mL  10 mL  Intravenous Q12H Blanca Tom APRN   10 mL at 07/05/20 2018   • sodium chloride 0.9 % flush 10 mL  10 mL Intravenous PRN Blanca Tom APRN       • sodium chloride 0.9 % flush 10 mL  10 mL Intravenous Q12H Radha Reed MD   10 mL at 07/06/20 0846   • sodium chloride 0.9 % flush 10 mL  10 mL Intravenous PRN Radha Reed MD           Imaging Results (Last 48 Hours)     Procedure Component Value Units Date/Time    MRI Brain Without Contrast [[id number]] Collected:  07/05/20 1346     Updated:  07/05/20 2251    Narrative:       EXAMINATION: MRI BRAIN WO CONTRAST - 07/05/2020     INDICATION: Left-sided weakness. Evaluate for stroke.     TECHNIQUE: Sagittal and axial T1 and axial T2 FLAIR and  diffusion-weighted images of the brain, axial T2 Flash images.     COMPARISON: Head CT scan and cerebral perfusion exam of 07/04/2020     FINDINGS: Perfusion study report indicates approximately 10 mL volume  area of ischemia in the left occipital lobe by rapid analysis. Today's  study shows no evidence of restricted diffusion to suggest acute  infarction the left occipital lobe or elsewhere. Remaining sequences  show a small focus of encephalomalacia in the medial left temporal lobe  and perhaps trace amount of gliosis in the same location of the right  occipital lobe. There is age-appropriate generalized cerebral atrophy  elsewhere. There is relatively mild nonconfluent central white matter  disease. There are no signal changes suggestive of hemorrhage, no  evidence of mass, mass effect, hydrocephalus, or abnormal extra-axial  collection. Sagittal images show the midline structures to appear  grossly normal. Anatomy of the craniocervical junction appears normal.  There is expected flow signal in the major intracranial arteries and in  the median sagittal sinus. There appears to be chronic opacification  left maxillary sinus. Remaining paranasal sinuses appear grossly clear.       Impression:       1. Small  focus of encephalomalacia in the left occipital lobe.. No  evidence of acute infarction here or elsewhere.  2. Chronic-appearing changes of the aging brain. No evidence of acute  intracranial disease.     DICTATED:   07/05/2020  EDITED/ls :   07/05/2020      This report was finalized on 7/5/2020 10:48 PM by Dr. Matt Malave MD.       CT Angiogram Neck [[id number]] Collected:  07/05/20 0017     Updated:  07/05/20 0019    Narrative:       CTA Neck, CTA Head    INDICATION:   Decreased responsiveness. Abnormal perfusion study showing ischemic change in the left occipital lobe    TECHNIQUE:   CT angiogram of the head and neck with IV contrast. 3-D reconstructions were obtained and reviewed.  Evaluation for a significant carotid arterial stenosis is based on the NASCET criteria. Radiation dose reduction techniques included automated exposure  control or exposure modulation based on body size. Count of known CT and cardiac nuc med studies performed in previous 12 months: 0.     COMPARISON:   CT brain and CT perfusion study of the brain from today    FINDINGS:   CTA neck:  Lung apices clear. Thyroid gland is enlarged and has a low-density lesion in the right lobe measuring 2.6 cm in diameter. Submandibular and parotid glands are normal.    Vascular findings aortic arch is normal in size. Great vessels are all patent. The study slightly degraded by motion. It is difficult to see either vertebral artery in the lower neck. There is reconstitution of the more distal vertebral arteries and the  left one is dominant. 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 There are calcified plaques in each carotid bifurcation region without significant stenosis. The rest of the internal carotid arteries are patent.    CTA head:  The middle and anterior tissue arteries appear normal.. A right posterior cerebral artery appears be present but is a lot of venous opacification. Its difficult to clearly see the left posterior cerebral artery.       Impression:       Study is somewhat limited. There is poor evaluation of both posterior cerebral arteries. Probably the right one is present but is difficult see the proximal left PCA. It is also Also difficult see the proximal vertebral arteries in the neck.    No significant carotid stenosis is identified.    Signer Name: Franklin Bella MD   Signed: 7/5/2020 12:17 AM   Workstation Name: [facility]    [facility]    CT Angiogram Head [[id number]] Collected:  07/05/20 0017     Updated:  07/05/20 0019    Narrative:       CTA Neck, CTA Head    INDICATION:   Decreased responsiveness. Abnormal perfusion study showing ischemic change in the left occipital lobe    TECHNIQUE:   CT angiogram of the head and neck with IV contrast. 3-D reconstructions were obtained and reviewed.  Evaluation for a significant carotid arterial stenosis is based on the NASCET criteria. Radiation dose reduction techniques included automated exposure  control or exposure modulation based on body size. Count of known CT and cardiac nuc med studies performed in previous 12 months: 0.     COMPARISON:   CT brain and CT perfusion study of the brain from today    FINDINGS:   CTA neck:  Lung apices clear. Thyroid gland is enlarged and has a low-density lesion in the right lobe measuring 2.6 cm in diameter. Submandibular and parotid glands are normal.    Vascular findings aortic arch is normal in size. Great vessels are all patent. The study slightly degraded by motion. It is difficult to see either vertebral artery in the lower neck. There is reconstitution of the more distal vertebral arteries and the  left one is dominant. 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 There are calcified plaques in each carotid bifurcation region without significant stenosis. The rest of the internal carotid arteries are patent.    CTA head:  The middle and anterior tissue arteries appear normal.. A right posterior cerebral artery appears be present  but is a lot of venous opacification. Its difficult to clearly see the left posterior cerebral artery.      Impression:       Study is somewhat limited. There is poor evaluation of both posterior cerebral arteries. Probably the right one is present but is difficult see the proximal left PCA. It is also Also difficult see the proximal vertebral arteries in the neck.    No significant carotid stenosis is identified.    Signer Name: Franklin Bella MD   Signed: 7/5/2020 12:17 AM   Workstation Name: [facility]    [facility]    CT Cerebral Perfusion With & Without Contrast [[id number]] Collected:  07/05/20 0004     Updated:  07/05/20 0006    Narrative:       CT CEREBRAL PERFUSION W WO CONTRAST    HISTORY:       TECHNIQUE:   Axial CT images of the brain without and with intravenous contrast using cerebral perfusion protocol. Post-processing parametric maps were created using RAPID software and reviewed. Radiation dose reduction techniques included automated exposure control  or exposure modulation based on body size. CT and nuclear cardiology exams in the last 12 months: 0.    COMPARISON:   CT scan earlier today    FINDINGS:   Arterial input function is optimal.     Ischemic tissue volume (Tmax > 6 seconds, includes core and penumbra): 10 cc in the left occipital lobe  Ischemic core volume (rCBF < 30%): 0  Mismatch (penumbra) volume 10 cc, ratio and then it  Volume of poor collateral perfusion (Tmax > 10 seconds): 0  Hypoperfusion index (Tmax > 10 seconds/Tmax > 6 seconds): 0  CBV index: 0.1      Impression:       There is a 10 cc area of ischemic change in the left occipital lobe without visualized core infarct.    I have given the results to the patient's nurse (Karly) by telephone at 12 4:00 AM          Signer Name: Franklin Bella MD   Signed: 7/5/2020 12:04 AM   Workstation Name: [facility]    CT Abdomen Pelvis Without Contrast [[id number]] Collected:  07/04/20  2356     Updated:  07/04/20 2358    Narrative:       CT Abdomen Pelvis WO    INDICATION:   Abdominal pain. Decreased responsiveness    TECHNIQUE:   CT of the abdomen and pelvis without IV contrast. Coronal and sagittal reconstructions were obtained.  Radiation dose reduction techniques included automated exposure control or exposure modulation based on body size. Count of known CT and cardiac nuc  med studies performed in previous 12 months: 0.     COMPARISON:   None available.    FINDINGS:  Abdomen: The gallbladder is been removed. The liver, spleen, pancreas, adrenal glands and kidneys are normal in appearance. Aorta is normal in size. There is no adenopathy. Bowel is normal except for a few sigmoid diverticuli.    Pelvis: Uterus is been removed. There are no adnexal masses. The bladder is normal except for focal area of benign-appearing calcification so she with posterior wall. This is 12 mm in diameter.      Impression:       Negative CT of the abdomen and pelvis.          Signer Name: Franklin Bella MD   Signed: 7/4/2020 11:56 PM   Workstation Name: [facility]    [facility]    CT Chest Without Contrast [[id number]] Collected:  07/04/20 2353     Updated:  07/04/20 2355    Narrative:       CT Chest WO    INDICATION:   Shortness of air tonight    TECHNIQUE:   CT of the thorax without IV contrast. Coronal and sagittal reconstructions were obtained.  Radiation dose reduction techniques included automated exposure control or exposure modulation based on body size. Count of known CT and cardiac nuc med studies  performed in previous 12 months: 0.     COMPARISON:   None available.    FINDINGS:  There is minimal basilar atelectasis otherwise the lungs are clear. The airways are patent. The thyroid tissue is enlarged bilaterally. The aorta is normal in size. There is no adenopathy. Bones are unremarkable.      Impression:       Minimal bibasilar atelectasis. Otherwise negative CT chest    Signer  Name: Franklin Bella MD   Signed: 7/4/2020 11:53 PM   Workstation Name: [facility]    CT Head Without Contrast Stroke Protocol [[id number]] Collected:  07/04/20 2350     Updated:  07/04/20 2352    Narrative:       CT Head WO Code Stroke    HISTORY:   Decreased responsiveness tonight    TECHNIQUE:   Axial unenhanced head CT. Radiation dose reduction techniques included automated exposure control or exposure modulation based on body size. Count of known CT and cardiac nuc med studies performed in previous 12 months: 0.     Time of scan: 11:40 PM    COMPARISON:   None.    FINDINGS:   No intracranial hemorrhage, mass, or infarct. No hydrocephalus or extra-axial fluid collection. Brain parenchymal density is normal. The skull base, calvarium, and extracranial soft tissues are normal.      Impression:       Normal, negative unenhanced head CT.          Signer Name: Franklin Bella MD   Signed: 7/4/2020 11:50 PM   Workstation Name: [facility]           Physician Progress Notes (last 24 hours) (Notes from 07/05/20 1122 through 07/06/20 1122)      Karthik Vazquez MD at 07/05/20 1213        Prelim LE US positive for DVT, heparin drip for now and monitor, consider NOAC later depending on findings and plan    Electronically signed by Karthik Vazquez MD at 07/05/20 1214       Physical Therapy Notes (last 24 hours) (Notes from 07/05/20 1122 through 07/06/20 1122)    No notes exist for this encounter.         Occupational Therapy Notes (last 24 hours) (Notes from 07/05/20 1122 through 07/06/20 1122)    No notes exist for this encounter.

## 2020-07-06 NOTE — THERAPY EVALUATION
Acute Care - Occupational Therapy Initial Evaluation  [facility]     Patient Name: Steffen Zeng  : 1940  MRN: [id number]  Today's Date: 2020             Admit Date: 2020       ICD-10-CM ICD-9-CM   1. Impaired mobility and ADLs Z74.09 V49.89    Z78.9      Patient Active Problem List   Diagnosis   • Acute hypoxemic respiratory failure (CMS/HCC)   • Essential hypertension   • Coronary artery disease involving native coronary artery of native heart without angina pectoris   • Chronic heart failure (CMS/HCC)   • Uncontrolled type 2 diabetes mellitus (CMS/HCC)   • Left-sided weakness   • Acute UTI (urinary tract infection)   • Acute DVT (deep venous thrombosis) (CMS/HCC)   • History of CVA (cerebrovascular accident)   • NSTEMI, initial episode of care (CMS/[facility])   • Hyperlipidemia LDL goal <70     Past Medical History:   Diagnosis Date   • CHF (congestive heart failure) (CMS/HCC)    • Coronary artery disease    • Diabetes mellitus (CMS/HCC)    • Disease of thyroid gland    • Elevated cholesterol    • Hypertension    • Stroke (CMS/HCC)      Past Surgical History:   Procedure Laterality Date   • APPENDECTOMY     • CARDIAC CATHETERIZATION     •  SECTION     • CHOLECYSTECTOMY     • HYSTERECTOMY     • KNEE ARTHROSCOPY Left           OT ASSESSMENT FLOWSHEET (last 12 hours)      Occupational Therapy Evaluation     Row Name 20 1012                   OT Evaluation Time/Intention    Subjective Information  complains of;pain  -JAGDEEP        Document Type  evaluation  -JAGDEEP        Mode of Treatment  individual therapy;occupational therapy  -JAGDEEP        Patient Effort  good  -JAGDEEP           General Information    Patient Profile Reviewed?  yes  -JAGDEEP        Patient Observations  alert;cooperative;agree to therapy  -JAGDEEP        Patient/Family Observations  Pt. supine in bed with dtr., who is a nurse present.  -JAGDEEP        General Observations of Patient  Pt. sitting up in bed.  -JAGDEEP        Prior Level of Function   independent:;all household mobility;ADL's;max assist:;dependent:;home management;driving;shopping pt. heats up pre-prepared meals.  Per family multiple falls.  -JAGDEEP        Equipment Currently Used at Home  rollator;ramp;grab bar;bath bench  -JAGDEEP        Existing Precautions/Restrictions  fall DVT and on bedrest minus to bed  -JAGDEEP        Limitations/Impairments  hearing  -JAGDEEP        Risks Reviewed  LOB;increased discomfort;lines disloged;patient:;family:  -JAGDEEP        Benefits Reviewed  patient:;family:;improve function;increase independence;increase strength;increase balance;increase knowledge  -JAGDEEP        Barriers to Rehab  previous functional deficit  -JAGDEEP           Relationship/Environment    Lives With  alone  -JAGDEEP        Family Caregiver if Needed  child(mainor), adult dtr works and with children so not available at all times.  -JAGDEEP           Resource/Environmental Concerns    Current Living Arrangements  home/apartment/condo  -JAGDEEP           Cognitive Assessment/Intervention- PT/OT    Orientation Status (Cognition)  oriented x 3  -JAGDEEP        Follows Commands (Cognition)  follows one step commands;over 90% accuracy;repetition of directions required limited by [place]  -JAGDEEP        Safety Deficit (Cognitive)  safety precautions awareness;safety precautions follow-through/compliance;insight into deficits/self awareness  -JAGDEEP           Safety Issues, Functional Mobility    Safety Issues Affecting Function (Mobility)  safety precautions follow-through/compliance;safety precaution awareness;insight into deficits/self awareness  -JAGDEEP        Impairments Affecting Function (Mobility)  balance;endurance/activity tolerance;strength;shortness of breath  -JAGDEEP           Bed Mobility Assessment/Treatment    Bed Mobility Assessment/Treatment  supine-sit;sit-supine;scooting/bridging  -JAGDEEP        Scooting/Bridging [place] (Bed Mobility)  2 person assist;dependent (less than 25% patient effort) to HOB  -JAGDEEP        Supine-Sit [place] (Bed  Mobility)  moderate assist (50% patient effort);2 person assist;nonverbal cues (demo/gesture);verbal cues  -JAGDEEP        Sit-Supine [place] (Bed Mobility)  moderate assist (50% patient effort);2 person assist;nonverbal cues (demo/gesture);verbal cues  -JAGDEEP        Bed Mobility, Safety Issues  decreased use of arms for pushing/pulling;decreased use of legs for bridging/pushing;impaired trunk control for bed mobility  -JAGDEEP        Assistive Device (Bed Mobility)  bed rails;head of bed elevated  -JAGDEEP        Comment (Bed Mobility)  per dtr. pt. only sleep in a recliner at home.  -JAGDEEP           Functional Mobility    Functional Mobility- Comment  pt. only allowed [place] [facility]  -JAGDEEP           Transfer Assessment/Treatment    Transfer Assessment/Treatment  toilet transfer;stand-sit transfer;sit-stand transfer  -JAGDEEP           Sit-Stand Transfer    Sit-Stand [place] (Transfers)  moderate assist (50% patient effort);nonverbal cues (demo/gesture);verbal cues  -JAGDEEP        Assistive Device (Sit-Stand Transfers)  other (see comments) UE support  -JAGDEEP           Stand-Sit Transfer    Stand-Sit [place] (Transfers)  minimum assist (75% patient effort);2 person assist;nonverbal cues (demo/gesture);verbal cues  -JAGDEEP        Assistive Device (Stand-Sit Transfers)  other (see comments) gait belt and UE support  -JAGDEEP           Toilet Transfer    Type (Toilet Transfer)  stand-sit;sit-stand  -JAGDEEP        [place] Level (Toilet Transfer)  moderate assist (50% patient effort);nonverbal cues (demo/gesture);verbal cues  -JAGDEEP        Assistive Device (Toilet Transfer)  commode, bedside without drop arms  -JAGDEEP           ADL Assessment/Intervention    50311 - OT Self Care/Mgmt Minutes  5  -JAGDEEP        BADL Assessment/Intervention  grooming;upper body dressing;bathing;toileting  -JAGDEEP           Bathing Assessment/Intervention    Comment (Bathing)  dtr washed pt.s UB while sitting on BSC and while OT helping pt. back to bed washed aguilar area and  buttocks.  -JAGDEEP           Lower Body Dressing Assessment/Training    Lower Body Dressing [place] Level  doff;don;socks;dependent (less than 25% patient effort)  -JAGDEEP        Comment (Lower Body Dressing)  per drt. pt. wears slip on shoes at home or gripper socks.  -JAGDEEP           Grooming Assessment/Training    [place] Level (Grooming)  grooming skills;oral care regimen;set up SBA  -JAGDEEP        Grooming Position  -- while sitting on BSC using bathroom  -JAGDEEP           Toileting Assessment/Training    Comment (Toileting)  dependent wiping, dtr. cleaned when OT helped pt. stand.  -JAGDEEP           BADL Safety/Performance    Impairments, BADL Safety/Performance  balance;endurance/activity tolerance;strength;trunk/postural control  -JAGDEEP        Skilled BADL Treatment/Intervention  cognitive/safety deficit modifications  -JAGDEEP           General ROM    GENERAL ROM COMMENTS  WFL AROM BUE  -JAGDEEP           MMT (Manual Muscle Testing)    General MMT Comments  BUE grossly 5/5  -JAGDEEP           Motor Assessment/Interventions    Additional Documentation  Balance (Group);Balance Interventions (Group);Therapeutic Exercise (Group);Therapeutic Exercise Interventions (Group)  -JAGDEEP           Therapeutic Exercise    45063 - OT Therapeutic Exercise Minutes  8  -JAGDEEP        81326 - OT Therapeutic Activity Minutes  5  -JAGDEEP           Therapeutic Exercise    Upper Extremity Range of Motion (Therapeutic Exercise)  shoulder flexion/extension, bilateral;shoulder abduction/adduction, bilateral;shoulder horizontal abduction/adduction, bilateral;elbow flexion/extension, bilateral  -JAGDEEP        Exercise Type (Therapeutic Exercise)  AROM (active range of motion);resistive exercises  -JAGDEEP        Position (Therapeutic Exercise)  supine  -JAGDEEP        Sets/Reps (Therapeutic Exercise)  1/15  -JAGDEEP        Expected Outcome (Therapeutic Exercise)  improve functional tolerance, self-care activity;improve performance, transfer skills;improve performance, BADLs  -JAGDEEP        Comment  (Therapeutic Exercise)  3 rest periods due to SOA  -JAGDEEP           Balance    Balance  static sitting balance;static standing balance;dynamic sitting balance;dynamic standing balance  -JAGDEEP           Static Sitting Balance    Level of [place] (Unsupported Sitting, Static Balance)  supervision  -JAGDEEP        Sitting Position (Unsupported Sitting, Static Balance)  sitting on edge of bed  -JAGDEEP           Dynamic Sitting Balance    Level of [place], Reaches Outside Midline (Sitting, Dynamic Balance)  standby assist  -JAGDEEP        Sitting Position, Reaches Outside Midline (Sitting, Dynamic Balance)  -- BSC  -JAGDEEP           Static Standing Balance    Level of [place] (Supported Standing, Static Balance)  minimal assist, 75% patient effort  -JAGDEEP        Time Able to Maintain Position (Supported Standing, Static Balance)  other (see comments) UE support  -JAGDEEP           Sensory Assessment/Intervention    Sensory General Assessment  no sensation deficits identified in BUE, per dtr. neuropathy BLE  -JAGDEEP        Additional Documentation  Hearing Assessment (Group)  -JAGDEEP           Hearing Assessment    Hearing Status  hearing impairment, bilaterally needs statements at times repeated several times  -JAGDEEP           Positioning and Restraints    Pre-Treatment Position  in bed  -JAGDEEP        Post Treatment Position  bed  -JAGDEEP        In Bed  supine;call light within reach;encouraged to call for assist;exit alarm on;with family/caregiver;legs elevated  -JAGDEEP           Pain Assessment    Additional Documentation  Pain Scale: Numbers Pre/Post-Treatment (Group)  -JAGDEEP           Pain Scale: Numbers Pre/Post-Treatment    Pain Scale: Numbers, Pretreatment  9/10 pt. appears to overrate pain based on ability and facial exp  -JAGDEEP        Pain Scale: Numbers, Post-Treatment  9/10  -JAGDEEP        Pain Location - Side  Bilateral  -JAGDEEP        Pain Location - Orientation  lower  -JAGDEEP        Pain Location  back BLE's  -JAGDEEP        Pre/Post Treatment Pain Comment  chronic  back pain and BLE pain  -JAGDEEP        Pain Intervention(s)  Repositioned;Medication (See MAR)  -JAGDEEP           Wound 07/05/20 0058 Left lower;anterior ankle Pressure Injury    Wound - Properties Group Date first assessed: 07/05/20 -MP Time first assessed: 0058 -MP Side: Left  -MP Orientation: lower;anterior  -MP Location: ankle  -MP Primary Wound Type: Pressure inj  -MP Stage, Pressure Injury: Stage 2  -MP       Wound 07/05/20 0058 Right anterior;lower leg Skin Tear    Wound - Properties Group Date first assessed: 07/05/20 -MP Time first assessed: 0058  -MP Side: Right  -MP Orientation: anterior;lower  -MP Location: leg  -MP Primary Wound Type: Skin tear  -MP Stage, Pressure Injury: unstageable  -MP       Wound 07/05/20 0058 Right medial gluteal Pressure Injury    Wound - Properties Group Date first assessed: 07/05/20 -MP Time first assessed: 0058  -MP Side: Right  -MP Orientation: medial  -MP Location: gluteal  -MP Primary Wound Type: Pressure inj  -MP Stage, Pressure Injury: Stage 2  -MP       Clinical Impression (OT)    OT Diagnosis  impaired ADL independence  -JAGDEEP        Patient/Family Goals Statement (OT Eval)  return to PLOF  -JAGDEEP        Criteria for Skilled Therapeutic Interventions Met (OT Eval)  yes;treatment indicated  -JAGDEEP        Rehab Potential (OT Eval)  good, to achieve stated therapy goals  -JAGDEEP        Therapy Frequency (OT Eval)  daily  -JAGDEEP        Care Plan Review (OT)  evaluation/treatment results reviewed;care plan/treatment goals reviewed;risks/benefits reviewed;current/potential barriers reviewed;patient/other agree to care plan  -JAGDEEP        Care Plan Review, Other Participant (OT Eval)  daughter  -JAGDEEP        Anticipated Discharge Disposition (OT)  skilled nursing facility  -JAGDEEP           Vital Signs    Pre Systolic BP Rehab  164  -JADGEEP        Pre Treatment Diastolic BP  58  -JAGDEEP        Post Systolic BP Rehab  171  -JAGDEEP        Post Treatment Diastolic BP  80  -JAGDEEP        Pretreatment Heart Rate (beats/min)   77  -JAGDEEP        Posttreatment Heart Rate (beats/min)  85  -JAGDEEP        Pre SpO2 (%)  96  -JAGDEEP        O2 Delivery Pre Treatment  room air  -JAGDEEP        Post SpO2 (%)  98  -JAGDEEP        O2 Delivery Post Treatment  room air  -JAGDEEP        Pre Patient Position  Supine  -JAGDEEP        Intra Patient Position  Standing  -JAGDEEP        Post Patient Position  Supine  -JAGDEEP           Planned OT Interventions    Planned Therapy Interventions (OT Eval)  activity tolerance training;BADL retraining;functional balance retraining;occupation/activity based interventions;patient/caregiver education/training;ROM/therapeutic exercise;strengthening exercise;transfer/mobility retraining  -JAGDEEP           OT Goals    Transfer Goal Selection (OT)  transfer, OT goal 1  -JAGDEEP        Dressing Goal Selection (OT)  dressing, OT goal 1  -JAGDEEP        Toileting Goal Selection (OT)  toileting, OT goal 1  -JAGDEEP        Grooming Goal Selection (OT)  grooming, OT goal 1  -JAGDEEP        Additional Documentation  Grooming Goal Selection (OT) (Row)  -JAGDEEP           Transfer Goal 1 (OT)    Activity/Assistive Device (Transfer Goal 1, OT)  sit-to-stand/stand-to-sit;bed-to-chair/chair-to-bed;toilet;commode, bedside without drop arms;walker, rolling  -JAGDEEP        [place] Level/Cues Needed (Transfer Goal 1, OT)  minimum assist (75% or more patient effort);tactile cues required;verbal cues required  -JAGDEEP        Time Frame (Transfer Goal 1, OT)  long term goal (LTG);10 days  -JAGDEEP        Progress/Outcome (Transfer Goal 1, OT)  goal ongoing  -JAGDEEP           Dressing Goal 1 (OT)    Activity/Assistive Device (Dressing Goal 1, OT)  upper body dressing;lower body dressing AE prn  -JAGDEEP        [place]/Cues Needed (Dressing Goal 1, OT)  minimum assist (75% or more patient effort);verbal cues required;tactile cues required  -JAGDEEP        Time Frame (Dressing Goal 1, OT)  long term goal (LTG);10 days  -JAGDEEP        Progress/Outcome (Dressing Goal 1, OT)  goal ongoing  -JAGDEEP           Toileting Goal 1 (OT)     Activity/Device (Toileting Goal 1, OT)  toileting skills, all;commode, bedside without drop arms;grab bar/safety frame;raised toilet seat  -JAGDEEP        [place] Level/Cues Needed (Toileting Goal 1, OT)  moderate assist (50-74% patient effort)  -JAGDEEP        Time Frame (Toileting Goal 1, OT)  long term goal (LTG);10 days  -JAGDEEP        Progress/Outcome (Toileting Goal 1, OT)  goal ongoing  -JAGDEEP           Grooming Goal 1 (OT)    Activity/Device (Grooming Goal 1, OT)  grooming skills, all sink side  -JAGDEEP        [place] (Grooming Goal 1, OT)  set-up required;contact guard assist  -JAGDEEP        Time Frame (Grooming Goal 1, OT)  long term goal (LTG);10 days  -JAGDEEP        Progress/Outcome (Grooming Goal 1, OT)  goal ongoing  -JAGDEEP           Living Environment    Home Accessibility  tub/shower is not walk in ramp, bar by toilet  -JAGDEEP          User Key  (r) = Recorded By, (t) = Taken By, (c) = Cosigned By    Initials Name Effective Dates    Bella Chamorro, OT 06/08/18 -     Brandt Kennedy RN 11/20/19 -          Occupational Therapy Education                 Title: PT OT SLP Therapies (In Progress)     Topic: Occupational Therapy (In Progress)     Point: ADL training (Done)     Description:   Instruct learner(s) on proper safety adaptation and remediation techniques during self care or transfers.   Instruct in proper use of assistive devices.              Learning Progress Summary           Patient Acceptance, E,D, VU,NR by JAGDEEP at 7/6/2020 1012    Comment:  reason for consult, evaluation results, goal setting, UE TE, transfer safety   Family Acceptance, E,D, VU,NR by JAGDEEP at 7/6/2020 1012    Comment:  reason for consult, evaluation results, goal setting, UE TE, transfer safety                   Point: Home exercise program (Done)     Description:   Instruct learner(s) on appropriate technique for monitoring, assisting and/or progressing therapeutic exercises/activities.              Learning Progress Summary           Patient  Acceptance, E,D, VU,NR by JAGDEEP at 7/6/2020 1012    Comment:  reason for consult, evaluation results, goal setting, UE TE, transfer safety   Family Acceptance, E,D, VU,NR by JAGDEEP at 7/6/2020 1012    Comment:  reason for consult, evaluation results, goal setting, UE TE, transfer safety                   Point: Precautions (Done)     Description:   Instruct learner(s) on prescribed precautions during self-care and functional transfers.              Learning Progress Summary           Patient Acceptance, E,D, VU,NR by JAGDEEP at 7/6/2020 1012    Comment:  reason for consult, evaluation results, goal setting, UE TE, transfer safety   Family Acceptance, E,D, VU,NR by JAGDEEP at 7/6/2020 1012    Comment:  reason for consult, evaluation results, goal setting, UE TE, transfer safety                   Point: Body mechanics (Not Started)     Description:   Instruct learner(s) on proper positioning and spine alignment during self-care, functional mobility activities and/or exercises.              Learner Progress:   Not documented in this visit.                      User Key     Initials Effective Dates Name Provider Type Discipline     06/08/18 -  Bella Campbell, OT Occupational Therapist OT                  OT Recommendation and Plan  Outcome Summary/Treatment Plan (OT)  Anticipated Discharge Disposition (OT): skilled nursing facility  Planned Therapy Interventions (OT Eval): activity tolerance training, BADL retraining, functional balance retraining, occupation/activity based interventions, patient/caregiver education/training, ROM/therapeutic exercise, strengthening exercise, transfer/mobility retraining  Therapy Frequency (OT Eval): daily  Plan of Care Review  Plan of Care Reviewed With: patient  Plan of Care Reviewed With: patient    Outcome Measures     Row Name 07/06/20 1012             How much help from another is currently needed...    Putting on and taking off regular lower body clothing?  2  -JAGDEEP      Bathing (including  washing, rinsing, and drying)  2  -JAGDEEP      Toileting (which includes using toilet bed pan or urinal)  2  -JAGDEEP      Putting on and taking off regular upper body clothing  2  -JAGDEEP      Taking care of personal grooming (such as brushing teeth)  3  -JAGDEEP      Eating meals  3  -JAGDEEP      AM-PAC 6 Clicks Score (OT)  14  -JAGDEEP         Modified [place] Scale    Pre-Stroke Modified [place] Scale  0 - No Symptoms at all.  -JAGDEEP      Modified [place] Scale  4 - Moderately severe disability.  Unable to walk without assistance, and unable to attend to own bodily needs without assistance.  -JAGDEEP         Functional Assessment    Outcome Measure Options  AM-PAC 6 Clicks Daily Activity (OT);Modified [place]  -JAGDEEP        User Key  (r) = Recorded By, (t) = Taken By, (c) = Cosigned By    Initials Name Provider Type    Bella Chamorro OT Occupational Therapist          Time Calculation:   Time Calculation- OT     Row Name 07/06/20 1126 07/06/20 1012          Time Calculation- OT    OT Start Time  1012  -JAGDEEP  --     OT Received On  07/06/20  -JAGDEEP  --     OT Goal Re-Cert Due Date  07/16/20  -JAGDEEP  --        Timed Charges    44362 - OT Therapeutic Exercise Minutes  --  8  -JAGDEEP     68401 - OT Therapeutic Activity Minutes  --  5  -JAGDEEP     13291 - OT Self Care/Mgmt Minutes  --  5  -JAGDEEP       User Key  (r) = Recorded By, (t) = Taken By, (c) = Cosigned By    Initials Name Provider Type    Bella Chamorro OT Occupational Therapist        Therapy Charges for Today     Code Description Service Date Service Provider Modifiers Qty    [id number]  OT THER PROC EA 15 MIN 7/6/2020 Bella Campbell OT GO 1    [id number]  OT EVAL LOW COMPLEXITY 4 7/6/2020 Bella Campbell OT GO 1               Bella Campbell OT  7/6/2020

## 2020-07-06 NOTE — PLAN OF CARE
Problem: Patient Care Overview  Goal: Individualization and Mutuality  Outcome: Ongoing (interventions implemented as appropriate)     Problem: Patient Care Overview  Goal: Individualization and Mutuality  Outcome: Ongoing (interventions implemented as appropriate)     Problem: Patient Care Overview  Goal: Plan of Care Review  Outcome: Ongoing (interventions implemented as appropriate)  Flowsheets (Taken 7/6/2020 5768)  Progress: no change  Plan of Care Reviewed With: patient; daughter  Note:   Pt had ct angio positive for pulm artery embolism. Heparin gtt stopped, eliquis started. Woc saw pt for wounds to buttocks, heel and shin,orders noted for wound care. Pt c/o pain to left groin and back, po meds and iv morphine given. Specialty bed ordered by woc.

## 2020-07-06 NOTE — PROGRESS NOTES
Stroke Progress Note       Chief Complaint:  BLE weakness, slurred speech     Subjective     Subjective:  She has a history of hypertension, diabetes mellitus type 2, CHF, MI, CAD, COPD and stroke 1 year ago.  She was found by her daughter slumped over the toilet seat unresponsive yesterday evening.  Last known time was normal.  She had CT head that was unremarkable, CT perfusion study that showed a perfusion deficit in the left occipital region and a CT angiogram of the head and neck that showed some stenosis in the left PCA as well as vertebrals but no significant intracarotid stenosis.  Her aspirin was increased from 81 to 325 mg and she was also started on Lipitor 80 mg.  Her lipid panel was as follows-, , , HDL 44.  A1c of 8.2.  White count was elevated at 15.4.  She has a UTI and has been started on Rocephin.  Patient was also found to have elevated troponins.  She was placed on heparin gtt     Interval history: patient had no events overnight. No new complaints or concerns. Daughter sitting at bedside. Discussed MRI and test results. Daughter reports that she seems back to her  Baseline mental state. She reports that her mother has had UTI in the past that can affect her like this. Although MRI did not show any acute infarct due to her past hx of CVA I explained to both patient and daughter the importance of controlling and minimizing her risk factor for stroke prevention. Prior to this hospitalization patient was living at home alone but with some assistance from daughter. Patient and daughter confirm that she is compliant with medications.       Review of Systems   Constitutional: Positive for fatigue.   HENT: Negative.    Eyes: Negative.    Respiratory: Positive for shortness of breath. Negative for cough.    Cardiovascular: Negative for chest pain.   Gastrointestinal: Negative.    Musculoskeletal: Positive for back pain.   Neurological: Negative.    Psychiatric/Behavioral: Negative.          Objective      Temp:  [97.5 °F (36.4 °C)-98.1 °F (36.7 °C)] 97.5 °F (36.4 °C)  Heart Rate:  [67-92] 92  Resp:  [17-19] 18  BP: (140-178)/(58-86) 164/58    Neurological Exam  Mental Status  Awake and alert. Oriented only to person. No dysarthria present. Language is fluent with no aphasia.     Cranial Nerves  CN II: Visual fields full to confrontation.  CN III, IV, VI: Extraocular movements intact bilaterally. Extraocular movements intact bilaterally. Normal lids and orbits bilaterally. Pupils equal round and reactive to light bilaterally.  CN V: Facial sensation is normal.  CN VII: No facial droop   CN VIII: Hearing is normal.  CN XI: Shoulder shrug strength is normal.  CN XII: Tongue midline without atrophy or fasciculations.     Motor   All extremities equal strength.     Sensory  No diminished sensation present     Gait  Not assessed     Physical Exam   Constitutional: She appears well-developed and well-nourished. Obese   HENT:   Head: Normocephalic and atraumatic.   Eyes: Pupils are equal, round, and reactive to light. EOM and lids are normal.   Neck: Normal range of motion. Neck supple.   Cardiovascular: Normal rate and regular rhythm.   Pulmonary/Chest: normal effort.    Musculoskeletal: pitting edema L>R  Neurological:As above   Skin: Skin is warm and dry.   Psych: pleasant, cooperative        Results Review:    I reviewed the patient's new clinical results.    Lab Results (last 24 hours)     Procedure Component Value Units Date/Time    POC Glucose Once [[id number]]  (Abnormal) Collected:  07/06/20 0728    Specimen:  Blood Updated:  07/06/20 0730     Glucose 266 mg/dL     Heparin Anti-Xa [[id number]]  (Abnormal) Collected:  07/06/20 0039    Specimen:  Blood Updated:  07/06/20 0126     Heparin Anti-Xa (UFH) 0.81 IU/ml     POC Glucose Once [[id number]]  (Abnormal) Collected:  07/05/20 2237    Specimen:  Blood Updated:  07/05/20 2239     Glucose 290 mg/dL     Heparin Anti-Xa [[id number]]  (Abnormal) Collected:   07/05/20 1714    Specimen:  Blood Updated:  07/05/20 1758     Heparin Anti-Xa (UFH) 0.26 IU/ml     Protime-INR [[id number]]  (Abnormal) Collected:  07/05/20 1045    Specimen:  Blood Updated:  07/05/20 1119     Protime 14.1 Seconds      INR 1.12    aPTT [[id number]]  (Abnormal) Collected:  07/05/20 1045    Specimen:  Blood Updated:  07/05/20 1119     PTT 31.1 seconds     Narrative:       PTT = The equivalent PTT values for the therapeutic range of heparin levels at 0.3 to 0.5 U/ml are 55 to 70 seconds.    POC Glucose Once [[id number]]  (Abnormal) Collected:  07/05/20 1114    Specimen:  Blood Updated:  07/05/20 1118     Glucose 197 mg/dL     Heparin Anti-Xa [[id number]]  (Abnormal) Collected:  07/05/20 1045    Specimen:  Blood Updated:  07/05/20 1113     Heparin Anti-Xa (UFH) 0.10 IU/ml         Ct Abdomen Pelvis Without Contrast    Result Date: 7/4/2020  Negative CT of the abdomen and pelvis. Signer Name: Franklin Bella MD  Signed: 7/4/2020 11:56 PM  Workstation Name: [facility]  Radiology [facility]    Ct Angiogram Neck    Result Date: 7/5/2020  Study is somewhat limited. There is poor evaluation of both posterior cerebral arteries. Probably the right one is present but is difficult see the proximal left PCA. It is also Also difficult see the proximal vertebral arteries in the neck. No significant carotid stenosis is identified. Signer Name: Franklin Bella MD  Signed: 7/5/2020 12:17 AM  Workstation Name: [facility]  Radiology [facility]    Ct Chest Without Contrast    Result Date: 7/4/2020  Minimal bibasilar atelectasis. Otherwise negative CT chest Signer Name: Franklin Bella MD  Signed: 7/4/2020 11:53 PM  Workstation Name: [facility]    Mri Brain Without Contrast    Result Date: 7/5/2020  1. Small focus of encephalomalacia in the left occipital lobe.. No evidence of acute infarction here or elsewhere. 2. Chronic-appearing changes of the aging brain. No evidence  of acute intracranial disease.  DICTATED:   07/05/2020 EDITED/ls :   07/05/2020  This report was finalized on 7/5/2020 10:48 PM by Dr. Matt Malave MD.      Ct Angiogram Head    Result Date: 7/5/2020  Study is somewhat limited. There is poor evaluation of both posterior cerebral arteries. Probably the right one is present but is difficult see the proximal left PCA. It is also Also difficult see the proximal vertebral arteries in the neck. No significant carotid stenosis is identified. Signer Name: Franklin Bella MD  Signed: 7/5/2020 12:17 AM  Workstation Name: [facility]    Ct Head Without Contrast Stroke Protocol    Result Date: 7/4/2020  Normal, negative unenhanced head CT. Signer Name: Franklin Bella MD  Signed: 7/4/2020 11:50 PM  Workstation Name: [facility]    Ct Cerebral Perfusion With & Without Contrast    Result Date: 7/5/2020  There is a 10 cc area of ischemic change in the left occipital lobe without visualized core infarct. I have given the results to the patient's nurse (Karly) by telephone at 12 4:00 AM Signer Name: Franklin Bella MD  Signed: 7/5/2020 12:04 AM  Workstation Name: [facility]    Results for orders placed during the hospital encounter of 07/04/20   Adult Transthoracic Echo Complete W/ Cont if Necessary Per Protocol    Narrative · Cardiac chamber sizes and wall thicknesses are normal. All wall   thicknesses are normal.  · Left ventricular systolic function appears to be at the lower limits of   normal to slightly depressed with an estimated ejection fraction of 51% -   55%.  · The trileaflet aortic valve has some degree of calcific changes.   Significant aortic stenosis/aortic insufficiency is not seen.  · The mitral valve is normal in structure and function.  · There is moderate tricuspid regurgitation with an estimated right   ventricular systolic pressure of 43 mmHg, sugesting moderately  severe PA   hypertension.  · An agitated saline study reveals no evidence of intracardiac shunting.  · There are no other important findings on this study.            Assessment/Plan     Assessment/Plan:      79-year-old female with multiple vascular risk factors presented with unresponsiveness.  CT perfusion showed a left occipital perfusion deficit.  CTA head shows left PCA narrowing.  Patient is back to baseline now     1.  TIA versus stroke   -MRI brain revealed small focus of encephalomalacia in the left occipital lobe. No  evidence of acute infarction   - echo- EF 51-55%. Agitated saline study reveals no evidence of intracardiac shunting.  -Continue aspirin 325 mg daily and  Lipitor 80 mg daily for secondary stroke prevention     2.  leukocytosis r/t UTI   -on Rocephin    3. Acute DVT of LLE  -on heparin gtt  -per cardiology consult reserve invasive therapies including mechanical thrombectomy and/or catheter infused TPA for DVT's that are refractory to anticoagulation therapy.  Please contact cardiology if left leg edema and pain persist despite anticoagulation.  -plan is to transition IV heparin to p.o. Eliquis at discharge--do need to verify what patient takes carbamazepine for. May have to consider another anticoagulant.     4. Demand ischemia  -cardiology consulted   -Elevated troponins x2 are flat which is consistent with demand ischemia in the setting of TIA  -Start isosorbide 30 mg daily per cardiology     5. HTN  -normal BP goals---Maintain blood pressure less than 130/90 as it has already been about 24 hours since symptom onset    6.HLD  -  -lipitor 80mg daily for secondary stroke prevention     7. DM 2, uncontrolled   -Goal A1c close to 6.5.  Her A1c was 8.2.  She would benefit from diabetic counseling and would need to see an endocrinologist as an outpatient    8. PT/OT--anticipated SNF vs inpatient rehab on discharge           Ariadne Evans, KAIA  07/06/20  09:41

## 2020-07-07 PROBLEM — I26.99 ACUTE PULMONARY EMBOLISM WITHOUT ACUTE COR PULMONALE (HCC): Status: ACTIVE | Noted: 2020-07-07

## 2020-07-07 LAB
GLUCOSE BLDC GLUCOMTR-MCNC: 256 MG/DL (ref 70–130)
GLUCOSE BLDC GLUCOMTR-MCNC: 273 MG/DL (ref 70–130)
GLUCOSE BLDC GLUCOMTR-MCNC: 303 MG/DL (ref 70–130)
GLUCOSE BLDC GLUCOMTR-MCNC: 329 MG/DL (ref 70–130)

## 2020-07-07 PROCEDURE — 99231 SBSQ HOSP IP/OBS SF/LOW 25: CPT | Performed by: NURSE PRACTITIONER

## 2020-07-07 PROCEDURE — 82962 GLUCOSE BLOOD TEST: CPT

## 2020-07-07 PROCEDURE — 63710000001 INSULIN LISPRO (HUMAN) PER 5 UNITS: Performed by: INTERNAL MEDICINE

## 2020-07-07 PROCEDURE — 25010000002 CEFTRIAXONE PER 250 MG: Performed by: INTERNAL MEDICINE

## 2020-07-07 PROCEDURE — 97162 PT EVAL MOD COMPLEX 30 MIN: CPT

## 2020-07-07 PROCEDURE — 99232 SBSQ HOSP IP/OBS MODERATE 35: CPT | Performed by: INTERNAL MEDICINE

## 2020-07-07 PROCEDURE — 63710000001 INSULIN DETEMIR PER 5 UNITS: Performed by: INTERNAL MEDICINE

## 2020-07-07 PROCEDURE — 25010000002 MORPHINE PER 10 MG: Performed by: INTERNAL MEDICINE

## 2020-07-07 RX ORDER — DONEPEZIL HYDROCHLORIDE 10 MG/1
10 TABLET, FILM COATED ORAL NIGHTLY
Status: DISCONTINUED | OUTPATIENT
Start: 2020-07-07 | End: 2020-07-10 | Stop reason: HOSPADM

## 2020-07-07 RX ORDER — PREGABALIN 75 MG/1
150 CAPSULE ORAL 2 TIMES DAILY
Status: DISCONTINUED | OUTPATIENT
Start: 2020-07-07 | End: 2020-07-10 | Stop reason: HOSPADM

## 2020-07-07 RX ORDER — ASPIRIN 81 MG/1
81 TABLET ORAL DAILY
Status: DISCONTINUED | OUTPATIENT
Start: 2020-07-08 | End: 2020-07-10 | Stop reason: HOSPADM

## 2020-07-07 RX ORDER — LOSARTAN POTASSIUM 50 MG/1
50 TABLET ORAL
Status: DISCONTINUED | OUTPATIENT
Start: 2020-07-07 | End: 2020-07-10 | Stop reason: HOSPADM

## 2020-07-07 RX ADMIN — FERROUS SULFATE TAB 325 MG (65 MG ELEMENTAL FE) 325 MG: 325 (65 FE) TAB at 09:16

## 2020-07-07 RX ADMIN — CYCLOBENZAPRINE HYDROCHLORIDE 10 MG: 10 TABLET, FILM COATED ORAL at 09:16

## 2020-07-07 RX ADMIN — APIXABAN 5 MG: 5 TABLET, FILM COATED ORAL at 16:55

## 2020-07-07 RX ADMIN — CYCLOBENZAPRINE HYDROCHLORIDE 10 MG: 10 TABLET, FILM COATED ORAL at 22:36

## 2020-07-07 RX ADMIN — ASPIRIN 325 MG ORAL TABLET 325 MG: 325 PILL ORAL at 09:16

## 2020-07-07 RX ADMIN — LORAZEPAM 1 MG: 1 TABLET ORAL at 22:36

## 2020-07-07 RX ADMIN — CASTOR OIL AND BALSAM, PERU: 788; 87 OINTMENT TOPICAL at 22:35

## 2020-07-07 RX ADMIN — PREGABALIN 150 MG: 75 CAPSULE ORAL at 12:44

## 2020-07-07 RX ADMIN — HYDROCODONE BITARTRATE AND ACETAMINOPHEN 1 TABLET: 5; 325 TABLET ORAL at 09:16

## 2020-07-07 RX ADMIN — HYDROCODONE BITARTRATE AND ACETAMINOPHEN 1 TABLET: 5; 325 TABLET ORAL at 16:54

## 2020-07-07 RX ADMIN — CARVEDILOL 12.5 MG: 12.5 TABLET, FILM COATED ORAL at 09:16

## 2020-07-07 RX ADMIN — PREGABALIN 150 MG: 75 CAPSULE ORAL at 22:35

## 2020-07-07 RX ADMIN — FLUTICASONE PROPIONATE 2 SPRAY: 50 SPRAY, METERED NASAL at 09:17

## 2020-07-07 RX ADMIN — LOSARTAN POTASSIUM 50 MG: 50 TABLET, FILM COATED ORAL at 09:16

## 2020-07-07 RX ADMIN — INSULIN DETEMIR 10 UNITS: 100 INJECTION, SOLUTION SUBCUTANEOUS at 22:38

## 2020-07-07 RX ADMIN — LEVOTHYROXINE SODIUM 50 MCG: 50 TABLET ORAL at 07:59

## 2020-07-07 RX ADMIN — CASTOR OIL AND BALSAM, PERU: 788; 87 OINTMENT TOPICAL at 09:18

## 2020-07-07 RX ADMIN — INSULIN LISPRO 4 UNITS: 100 INJECTION, SOLUTION INTRAVENOUS; SUBCUTANEOUS at 09:16

## 2020-07-07 RX ADMIN — DULOXETINE HYDROCHLORIDE 60 MG: 60 CAPSULE, DELAYED RELEASE ORAL at 22:37

## 2020-07-07 RX ADMIN — DONEPEZIL HYDROCHLORIDE 10 MG: 10 TABLET, FILM COATED ORAL at 22:36

## 2020-07-07 RX ADMIN — SODIUM CHLORIDE, PRESERVATIVE FREE 10 ML: 5 INJECTION INTRAVENOUS at 22:37

## 2020-07-07 RX ADMIN — ISOSORBIDE MONONITRATE 30 MG: 30 TABLET, EXTENDED RELEASE ORAL at 09:17

## 2020-07-07 RX ADMIN — FAMOTIDINE 40 MG: 20 TABLET, FILM COATED ORAL at 09:16

## 2020-07-07 RX ADMIN — APIXABAN 5 MG: 5 TABLET, FILM COATED ORAL at 07:59

## 2020-07-07 RX ADMIN — AMITRIPTYLINE HYDROCHLORIDE 10 MG: 10 TABLET, FILM COATED ORAL at 22:36

## 2020-07-07 RX ADMIN — CARVEDILOL 12.5 MG: 12.5 TABLET, FILM COATED ORAL at 16:54

## 2020-07-07 RX ADMIN — CEFTRIAXONE SODIUM 1 G: 1 INJECTION, POWDER, FOR SOLUTION INTRAMUSCULAR; INTRAVENOUS at 22:36

## 2020-07-07 RX ADMIN — INSULIN DETEMIR 10 UNITS: 100 INJECTION, SOLUTION SUBCUTANEOUS at 09:54

## 2020-07-07 RX ADMIN — ATORVASTATIN CALCIUM 80 MG: 40 TABLET, FILM COATED ORAL at 22:36

## 2020-07-07 RX ADMIN — SODIUM CHLORIDE, PRESERVATIVE FREE 10 ML: 5 INJECTION INTRAVENOUS at 09:18

## 2020-07-07 RX ADMIN — INSULIN LISPRO 5 UNITS: 100 INJECTION, SOLUTION INTRAVENOUS; SUBCUTANEOUS at 12:44

## 2020-07-07 RX ADMIN — MORPHINE SULFATE 2 MG: 2 INJECTION, SOLUTION INTRAMUSCULAR; INTRAVENOUS at 23:32

## 2020-07-07 RX ADMIN — INSULIN LISPRO 4 UNITS: 100 INJECTION, SOLUTION INTRAVENOUS; SUBCUTANEOUS at 17:45

## 2020-07-07 NOTE — CONSULTS
"                  Clinical Nutrition     Nutrition Education   Reason for Visit:   Diabetes educator consult    Patient Name: Steffen Zeng  YOB: 1940  MRN: [id number]  Date of Encounter: 07/07/20 10:59  Admission date: 7/4/2020       Assessment     Applicable diagnosis:  ?TIA  T2DM      Reported/Observed/Food/Nutrition Related History:      Patient present in room, daughter not present. RDN provided T2DM nutrition education. Written materials from the [employer] - Diabetes Practice Group provided to patient.  Discussed patients typical routine at home. She usually eats x3 meals daily. Her daughter will prep her meals and freeze them so all she has to do is heat them up. She also receives meals on wheels but does not always like the way they \"prepare\" their meats. She loves cereal and usually has a bowl daily, sometimes twice daily if she does not feel like she is able to fix anything else herself. Discussed portion control with carbohydrate containing foods and reviewed the main groups of carbohydrates. Explained that cereal and other carbohydrate containing foods are fine in moderation as long as portion sizes are controlled. Reviewed visualizing a recommended portion size of patients cereal and using smaller bowls and plates. Discussed protein containing foods and encouraged combining protein and carbohydrate foods in the same meal/snack for more balanced meal. Patient acknowledged understanding of information discussed. Discussed limiting sugar sweetened beverages / choosing diet drinks and encouraged compliance with medications. Reviewed reading nutrition label and looking at total carbohydrate count instead of sugar content. Patient acknowledged understanding and asked appropriate questions. Patient not local to [place] but encouraged patient to follow up with PCP who visits her at her home to locate local dietitian services following discharge if her or her daughter " "have questions.    Labs reviewed     Results from last 7 days   Lab Units 07/06/20  0903 07/05/20  0513 07/05/20  0055   GLUCOSE mg/dL 294* 284* 334*   BUN mg/dL 17 22 23   CREATININE mg/dL 0.96 1.10* 1.24*   SODIUM mmol/L 139 138 138   CHLORIDE mmol/L 102 101 99   POTASSIUM mmol/L 3.5 3.2* 3.5   ALT (SGPT) U/L  --  35* 37*     Results from last 7 days   Lab Units 07/05/20  0513 07/05/20  0055   ALBUMIN g/dL 3.20* 3.40*   CHOLESTEROL mg/dL  --  312*   TRIGLYCERIDES mg/dL  --  176*         Results from last 7 days   Lab Units 07/07/20  0805 07/06/20  2000 07/06/20  1601 07/06/20  1108 07/06/20  0728 07/05/20  2237   GLUCOSE mg/dL 256* 294* 236* 323* 266* 290*     Lab Results   Lab Value Date/Time    HGBA1C 8.10 (H) 07/05/2020 0055    HGBA1C 8.20 (H) 07/05/2020 0055       Nutrition Diagnosis     7/7  Problem Altered nutrition related lab values    Etiology DM2   Signs/Symptoms HgbA1c = 8.2%       Nutrition Intervention     Provided nutrition education regarding:  Consistent Carbohydrate Education, Type 2 Diabetes Nutrition Therapy , Key food habit change and Healthy eating for diabetes      RD provided printed material via Other - [employer] - \"Ready, Set, Carb Count!\"    Pt acknowledged understanding of material covered    Goal:     Increase knowledge on diet/nutrition effects on condition/status     Monitoring/Evaluation:     Biochemical data     Pt likely requires additional education/support via outpatient nutrition education     Michelle Gar, ROMER[place], LD  Time Spent: 35 minutes    "

## 2020-07-07 NOTE — THERAPY EVALUATION
Patient Name: Steffen Zeng  : 1940    MRN: [id number]                              Today's Date: 2020       Admit Date: 2020    Visit Dx:     ICD-10-CM ICD-9-CM   1. Impaired mobility and ADLs Z74.09 V49.89    Z78.9      Patient Active Problem List   Diagnosis   • Acute hypoxemic respiratory failure (CMS/HCC)   • Essential hypertension   • Coronary artery disease involving native coronary artery of native heart without angina pectoris   • Chronic heart failure (CMS/HCC)   • Uncontrolled type 2 diabetes mellitus (CMS/HCC)   • Left-sided weakness   • Acute UTI (urinary tract infection)   • Acute DVT (deep venous thrombosis) (CMS/[facility])   • History of CVA (cerebrovascular accident)   • Hyperlipidemia LDL goal <70   • CKD (chronic kidney disease) stage 3, GFR 30-59 ml/min (CMS/[facility])   • Demand ischemia (CMS/[facility])   • Acute pulmonary embolism without acute cor pulmonale (CMS/[facility])     Past Medical History:   Diagnosis Date   • CHF (congestive heart failure) (CMS/[facility])    • Coronary artery disease    • Diabetes mellitus (CMS/[facility])    • Disease of thyroid gland    • Elevated cholesterol    • Hypertension    • Stroke (CMS/[facility])      Past Surgical History:   Procedure Laterality Date   • APPENDECTOMY     • CARDIAC CATHETERIZATION     •  SECTION     • CHOLECYSTECTOMY     • HYSTERECTOMY     • KNEE ARTHROSCOPY Left      General Information     Row Name 20 1338          PT Evaluation Time/Intention    Document Type  evaluation  -NS     Mode of Treatment  individual therapy;physical therapy  -NS     Row Name 20 1333          General Information    Patient Profile Reviewed?  yes  -NS     Prior Level of Function  independent:;all household mobility;gait;transfer;bed mobility;ADL's Pt uses rollator and walks short distances at baseline. Many falls recently.  -NS     Existing Precautions/Restrictions  fall  -NS     Barriers to Rehab  previous functional deficit  -NS     Row Name 20 1336           Relationship/Environment    Lives With  alone  -NS     Row Name 07/07/20 1338          Resource/Environmental Concerns    Current Living Arrangements  home/apartment/condo  -NS     [facility] Name 07/07/20 1338          Home Main Entrance    Number of Stairs, Main Entrance  none  -NS     [facility] Name 07/07/20 1338          Stairs Within Home, Primary    Number of Stairs, Within Home, Primary  none  -NS     [facility] Name 07/07/20 1338          Cognitive Assessment/Intervention- PT/OT    Orientation Status (Cognition)  oriented x 4  -NS     [facility] Name 07/07/20 1338          Safety Issues, Functional Mobility    Safety Issues Affecting Function (Mobility)  insight into deficits/self awareness;safety precaution awareness;safety precautions follow-through/compliance;sequencing abilities  -NS     Impairments Affecting Function (Mobility)  balance;endurance/activity tolerance;strength;shortness of breath;coordination;motor planning;pain  -NS       User Key  (r) = Recorded By, (t) = Taken By, (c) = Cosigned By    Initials Name Provider Type    Monika Gross PT Physical Therapist        Mobility     Row Name 07/07/20 1338          Bed Mobility Assessment/Treatment    Comment (Bed Mobility)  Patient UIC at start and end of session.  -NS     Row Name 07/07/20 1338          Transfer Assessment/Treatment    Comment (Transfers)  VCs for hand placement. Pt with mild unsteadiness upon standing.   -NS     [facility] Name 07/07/20 1338          Sit-Stand Transfer    Sit-Stand [place] (Transfers)  minimum assist (75% patient effort);2 person assist;verbal cues  -NS     Assistive Device (Sit-Stand Transfers)  walker, front-wheeled  -NS     [facility] Name 07/07/20 1338          Gait/Stairs Assessment/Training    Gait/Stairs Assessment/Training  gait/ambulation assistive device  -NS     [place] Level (Gait)  moderate assist (50% patient effort);2 person assist  -NS     Assistive Device (Gait)  walker, front-wheeled  -NS     Distance in Feet (Gait)  5   -NS     Pattern (Gait)  step-to  -NS     Deviations/Abnormal Patterns (Gait)  bilateral deviations;base of support, wide;waylon decreased;gait speed decreased;stride length decreased  -NS     Bilateral Gait Deviations  forward flexed posture;heel strike decreased;weight shift ability decreased  -NS     Comment (Gait/Stairs)  Patient ambulated at very slow gait speed with RW and Mod A x2 + chair follow. Pt required verbal and tactile cues for weight shifting and advancing BLEs. She demonstrated a wide REGINA and increased fatigue following gait. Chair brought up behind her.   -NS       User Key  (r) = Recorded By, (t) = Taken By, (c) = Cosigned By    Initials Name Provider Type    Monika Gross, PT Physical Therapist        Obj/Interventions     Row Name 07/07/20 1338          General ROM    GENERAL ROM COMMENTS  BLEs: WFL  -NS     Row Name 07/07/20 1338          MMT (Manual Muscle Testing)    General MMT Comments  BLEs: grossly 4+/5  -NS     Row Name 07/07/20 1338          Static Sitting Balance    Level of [place] (Unsupported Sitting, Static Balance)  supervision  -NS     Sitting Position (Unsupported Sitting, Static Balance)  sitting in chair  -NS     Time Able to Maintain Position (Unsupported Sitting, Static Balance)  1 to 2 minutes  -NS     Row Name 07/07/20 1338          Dynamic Sitting Balance    Level of [place], Reaches Outside Midline (Sitting, Dynamic Balance)  supervision  -NS     Sitting Position, Reaches Outside Midline (Sitting, Dynamic Balance)  sitting in chair  -NS     Comment, Reaches Outside Midline (Sitting, Dynamic Balance)  MMT  -NS     Row Name 07/07/20 1338          Static Standing Balance    Level of [place] (Supported Standing, Static Balance)  minimal assist, 75% patient effort;2 person assist  -NS     Time Able to Maintain Position (Supported Standing, Static Balance)  15 to 30 seconds  -NS     Assistive Device Utilized (Supported Standing, Static Balance)  walker,  rolling  -NS     Row Name 07/07/20 1338          Dynamic Standing Balance    Level of [place], Reaches Outside Midline (Standing, Dynamic Balance)  moderate assist, 50 to 74% patient effort;2 person assist  -NS     Time Able to Maintain Position, Reaches Outside Midline (Standing, Dynamic Balance)  1 to 2 minutes  -NS     Assistive Device Utilized (Supported Standing, Dynamic Balance)  walker, rolling  -NS     Row Name 07/07/20 1338          Sensory Assessment/Intervention    Sensory General Assessment  -- Pt has peripheral neuropathy in BLEs, with decreased light touch sensation extending up just past B knees.  -NS       User Key  (r) = Recorded By, (t) = Taken By, (c) = Cosigned By    Initials Name Provider Type    Monika Gross PT Physical Therapist        Goals/Plan     Row Name 07/07/20 133          Bed Mobility Goal 1 (PT)    Activity/Assistive Device (Bed Mobility Goal 1, PT)  sit to supine/supine to sit  -NS     [place] Level/Cues Needed (Bed Mobility Goal 1, PT)  contact guard assist  -NS     Time Frame (Bed Mobility Goal 1, PT)  2 weeks  -NS     Row Name 07/07/20 133          Transfer Goal 1 (PT)    Activity/Assistive Device (Transfer Goal 1, PT)  sit-to-stand/stand-to-sit;bed-to-chair/chair-to-bed;walker, rolling  -NS     [place] Level/Cues Needed (Transfer Goal 1, PT)  contact guard assist  -NS     Time Frame (Transfer Goal 1, PT)  2 weeks  -NS     Row Name 07/07/20 1335          Gait Training Goal 1 (PT)    Activity/Assistive Device (Gait Training Goal 1, PT)  gait (walking locomotion);assistive device use;walker, rolling  -NS     [place] Level (Gait Training Goal 1, PT)  contact guard assist  -NS     Distance (Gait Goal 1, PT)  50  -NS     Time Frame (Gait Training Goal 1, PT)  2 weeks  -NS       User Key  (r) = Recorded By, (t) = Taken By, (c) = Cosigned By    Initials Name Provider Type    Monika Gross PT Physical Therapist        Clinical Impression     Row Name  07/07/20 1338          Pain Assessment    Additional Documentation  Pain Scale: Numbers Pre/Post-Treatment (Group)  -NS     Row Name 07/07/20 5898          Pain Scale: Numbers Pre/Post-Treatment    Pain Scale: Numbers, Pretreatment  4/10  -NS     Pain Scale: Numbers, Post-Treatment  4/10  -NS     Pain Location - Side  Left  -NS     Pain Location  groin  -NS     Pain Intervention(s)  Repositioned;Ambulation/increased activity  -NS     Row Name 07/07/20 1332          Plan of Care Review    Plan of Care Reviewed With  patient  -NS     Progress  no change PT eval  -NS     Row Name 07/07/20 1338          Physical Therapy Clinical Impression    Patient/Family Goals Statement (PT Clinical Impression)  To move around more easily.  -NS     Criteria for Skilled Interventions Met (PT Clinical Impression)  yes;treatment indicated  -NS     Rehab Potential (PT Clinical Summary)  good, to achieve stated therapy goals  -NS     Predicted Duration of Therapy (PT)  2 weeks  -NS     Row Name 07/07/20 1338          Vital Signs    Pre Systolic BP Rehab  134  -NS     Pre Treatment Diastolic BP  64  -NS     Post Systolic BP Rehab  140  -NS     Post Treatment Diastolic BP  62  -NS     Pretreatment Heart Rate (beats/min)  80  -NS     Posttreatment Heart Rate (beats/min)  77  -NS     Pre SpO2 (%)  95  -NS     O2 Delivery Pre Treatment  room air  -NS     Post SpO2 (%)  95  -NS     O2 Delivery Post Treatment  room air  -NS     Pre Patient Position  Sitting  -NS     Intra Patient Position  Standing  -NS     Post Patient Position  Sitting  -NS     Row Name 07/07/20 1335          Positioning and Restraints    Pre-Treatment Position  sitting in chair/recliner  -NS     Post Treatment Position  chair  -NS     In Chair  reclined;call light within reach;encouraged to call for assist;exit alarm on;with family/caregiver;waffle cushion;on mechanical lift sling;legs elevated;heels elevated  -NS       User Key  (r) = Recorded By, (t) = Taken By, (c) =  Cosigned By    Initials Name Provider Type    Monika Gross PT Physical Therapist        Outcome Measures     Row Name 07/07/20 1338          How much help from another person do you currently need...    Turning from your back to your side while in flat bed without using bedrails?  3  -NS     Moving from lying on back to sitting on the side of a flat bed without bedrails?  2  -NS     Moving to and from a bed to a chair (including a wheelchair)?  2  -NS     Standing up from a chair using your arms (e.g., wheelchair, bedside chair)?  3  -NS     Climbing 3-5 steps with a railing?  1  -NS     To walk in hospital room?  2  -NS     AM-PAC 6 Clicks Score (PT)  13  -NS     Row Name 07/07/20 2836          Modified [place] Scale    Pre-Stroke Modified [place] Scale  0 - No Symptoms at all.  -NS     Modified [place] Scale  4 - Moderately severe disability.  Unable to walk without assistance, and unable to attend to own bodily needs without assistance.  -NS     Row Name 07/07/20 1338          Functional Assessment    Outcome Measure Options  AM-PAC 6 Clicks Basic Mobility (PT);Modified [place]  -NS       User Key  (r) = Recorded By, (t) = Taken By, (c) = Cosigned By    Initials Name Provider Type    Monika Gross PT Physical Therapist        Physical Therapy Education                 Title: PT OT SLP Therapies (In Progress)     Topic: Physical Therapy (In Progress)     Point: Mobility training (Done)     Description:   Instruct learner(s) on safety and technique for assisting patient out of bed, chair or wheelchair.  Instruct in the proper use of assistive devices, such as walker, crutches, cane or brace.              Patient Friendly Description:   It's important to get you on your feet again, but we need to do so in a way that is safe for you. Falling has serious consequences, and your personal safety is the most important thing of all.        When it's time to get out of bed, one of us or a family member will sit next to  you on the bed to give you support.     If your doctor or nurse tells you to use a walker, crutches, a cane, or a brace, be sure you use it every time you get out of bed, even if you think you don't need it.    Learning Progress Summary           Patient Acceptance, E, VU,NR by NS at 7/7/2020 1551    Comment:  Patient was educated about PT POC, safety/sequencing with mobility, and rehab benefits.   Family Acceptance, E, VU,NR by NS at 7/7/2020 1551    Comment:  Patient was educated about PT POC, safety/sequencing with mobility, and rehab benefits.                   Point: Home exercise program (Not Started)     Description:   Instruct learner(s) on appropriate technique for monitoring, assisting and/or progressing patient with therapeutic exercises and activities.              Learner Progress:   Not documented in this visit.          Point: Body mechanics (Done)     Description:   Instruct learner(s) on proper positioning and spine alignment for patient and/or caregiver during mobility tasks and/or exercises.              Learning Progress Summary           Patient Acceptance, E, VU,NR by NS at 7/7/2020 1551    Comment:  Patient was educated about PT POC, safety/sequencing with mobility, and rehab benefits.   Family Acceptance, E, VU,NR by NS at 7/7/2020 1551    Comment:  Patient was educated about PT POC, safety/sequencing with mobility, and rehab benefits.                   Point: Precautions (Done)     Description:   Instruct learner(s) on prescribed precautions during mobility and gait tasks              Learning Progress Summary           Patient Acceptance, E, VU,NR by NS at 7/7/2020 1551    Comment:  Patient was educated about PT POC, safety/sequencing with mobility, and rehab benefits.   Family Acceptance, E, VU,NR by NS at 7/7/2020 1551    Comment:  Patient was educated about PT POC, safety/sequencing with mobility, and rehab benefits.                               User Key     Initials Effective Dates  Name Provider Type Discipline    NS 09/10/19 -  Monika Martin, PT Physical Therapist PT              PT Recommendation and Plan  Planned Therapy Interventions (PT Eval): balance training, bed mobility training, gait training, home exercise program, neuromuscular re-education, patient/family education, strengthening, transfer training  Outcome Summary/Treatment Plan (PT)  Anticipated Discharge Disposition (PT): skilled nursing facility  Plan of Care Reviewed With: patient  Progress: no change(PT eval)  Outcome Summary: Patient presents with generalized weakness and decreased activity tolerance affecting independence with mobility. She transferred STS with Min Ax 2 and ambulated 5ft with RW and Mod A x2 + chair follow, demonstrating decreased sequencing and weight shifting ability. Distance limited by weakness and fatigue. Skilled IP PT warranted. Recommend SNF at discharge to promote PLOF.     Time Calculation:   PT Charges     Row Name 07/07/20 1338             Time Calculation    Start Time  1338  -NS      PT Received On  07/07/20  -NS      PT Goal Re-Cert Due Date  07/17/20  -NS        User Key  (r) = Recorded By, (t) = Taken By, (c) = Cosigned By    Initials Name Provider Type    NS Monika Martin, PT Physical Therapist        Therapy Charges for Today     Code Description Service Date Service Provider Modifiers Qty    64079264164 HC PT THER SUPP EA 15 MIN 7/7/2020 Monika Martin, PT GP 2    [id number] HC PT EVAL MOD COMPLEXITY 4 7/7/2020 Monika Martin, PT GP 1          PT G-Codes  Outcome Measure Options: AM-PAC 6 Clicks Basic Mobility (PT), Modified [place]  AM-PAC 6 Clicks Score (PT): 13  AM-PAC 6 Clicks Score (OT): 14  Modified [place] Scale: 4 - Moderately severe disability.  Unable to walk without assistance, and unable to attend to own bodily needs without assistance.    Monika Martin PT  7/7/2020

## 2020-07-07 NOTE — PROGRESS NOTES
Patient initiated on Apixaban this admission for VTE. Education provided on 7/7/2020 verbally and in writing to patient's daughter (primary caregiver). Information provided includes effects of medication, drug-drug and drug-food interactions, and signs/symptoms of bleeding and clotting. Patient's daughter is an RN, she verbalized understanding through teach back. All pertinent questions were answered.     Emily Lizarraga, [place], BCPS  7/7/2020  13:09

## 2020-07-07 NOTE — DISCHARGE PLACEMENT REQUEST
"Steffen Zeng (79 y.o. Female)   Carlos Mayers, RN Case Manager  [phone]    Date of Birth Social Security Number Address Home Phone MRN    1940  [address] [place] [address] [phone] [id number]    [employer] Marital Status          Yarsani        Admission Date Admission Type Admitting Provider Attending Provider Department, Room/Bed    7/4/20 Urgent Berna Mims MD Opii, [place], MD [facility] 3F, S314/1    Discharge Date Discharge Disposition Discharge Destination                       Attending Provider:  Berna Mims MD    Allergies:  No Known Allergies    Isolation:  None   Infection:  None   Code Status:  No CPR    Ht:  160 cm (63\")   Wt:  110 kg (242 lb)    Admission Cmt:  None   Principal Problem:  Left-sided weakness [R53.1] More...                 Active Insurance as of 7/4/2020     Primary Coverage     Payor Plan Insurance Group Employer/Plan Group    MEDICARE MEDICARE A & B      Payor Plan Address Payor Plan Phone Number Payor Plan Fax Number Effective Dates    [address] [phone]  10/1/2004 - None Entered    [facility] [id number]       Subscriber Name Subscriber Birth Date Member ID       STEFFEN ZENG 1940 [id number]           Secondary Coverage     Payor Plan Insurance Group Employer/Plan Group    [facility] SUPP [id number]     Payor Plan Address Payor Plan Phone Number Payor Plan Fax Number Effective Dates    [address]   12/1/2016 - None Entered    [facility] [id number]       Subscriber Name Subscriber Birth Date Member ID       STEFFEN ZENG 1940 [id number]                 Emergency Contacts      (Rel.) Home Phone Work Phone Mobile Phone    Cher Velázquez (Daughter) [phone] -- --    Payal Luu (Sister) [phone] -- --            Vital Signs (last day)     Date/Time   Temp   Temp src   Pulse   Resp   BP   Patient Position   SpO2    07/07/20 1300   --   --   87   --   --   --   --    07/07/20 " 1137   97.8 (36.6)   Oral   77   18   134/64   Sitting   --    07/07/20 0805   97.8 (36.6)   Oral   78   18   162/83   Lying   94    07/07/20 0451   98.3 (36.8)   Oral   79   18   131/77   Lying   91    07/06/20 2245   98.2 (36.8)   Oral   78   16   138/73   Lying   96    07/06/20 1957   98.3 (36.8)   Oral   71   18   131/61   Lying   96    07/06/20 1654   --   --   70   --   144/78   --   --    07/06/20 1544   98.5 (36.9)   Oral   72   18   144/78   Lying   96    07/06/20 1222   97.3 (36.3)   Oral   78   18   158/74   Lying   96    07/06/20 0759   97.5 (36.4)   Oral   92   18   164/58   Lying   94    07/06/20 0608   --   --   82   --   --   --   96    07/06/20 0320   97.6 (36.4)   Oral   86   19   178/86   Lying   95    07/06/20 0009   97.9 (36.6)   Oral   78   17   167/79   Lying   --              Current Facility-Administered Medications   Medication Dose Route Frequency Provider Last Rate Last Dose   • acetaminophen (TYLENOL) tablet 650 mg  650 mg Oral Q4H PRN Radha Reed MD   650 mg at 07/06/20 1019    Or   • acetaminophen (TYLENOL) 160 MG/5ML solution 650 mg  650 mg Oral Q4H PRN Radha Reed MD        Or   • acetaminophen (TYLENOL) suppository 650 mg  650 mg Rectal Q4H PRN Radha Reed MD       • acetaminophen (TYLENOL) suppository 650 mg  650 mg Rectal Q4H PRN Radha Reed MD   650 mg at 07/05/20 0058   • amitriptyline (ELAVIL) tablet 10 mg  10 mg Oral Nightly Anna Castillo DO   10 mg at 07/06/20 2047   • apixaban (ELIQUIS) tablet 5 mg  5 mg Oral Q12H Nahid Sanz MD   5 mg at 07/07/20 0759   • [START ON 7/8/2020] aspirin EC tablet 81 mg  81 mg Oral Daily Brooklyn Giron APRN       • atorvastatin (LIPITOR) tablet 80 mg  80 mg Oral Nightly Blanca Tom APRN   80 mg at 07/06/20 2043   • carvedilol (COREG) tablet 12.5 mg  12.5 mg Oral BID With Meals Radha Reed MD   12.5 mg at 07/07/20 0916   • castor oil-balsam peru (VENELEX) ointment   Topical Q12H Jonathan,  Anna ANNA DO       • cefTRIAXone (ROCEPHIN) 1 g/100 mL 0.9% NS (MBP)  1 g Intravenous Q24H Berna Mims MD   1 g at 07/06/20 2043   • cyclobenzaprine (FLEXERIL) tablet 10 mg  10 mg Oral TID PRN Anna Castillo DO   10 mg at 07/07/20 0916   • dextrose (D50W) 25 g/ 50mL Intravenous Solution 25 g  25 g Intravenous Q15 Min PRN Radha Reed MD       • dextrose (GLUTOSE) oral gel 15 g  15 g Oral Q15 Min PRN Radha Reed MD       • docusate sodium (COLACE) capsule 100 mg  100 mg Oral BID PRN Radha Reed MD       • donepezil (ARICEPT) tablet 10 mg  10 mg Oral Nightly Berna Mims MD       • DULoxetine (CYMBALTA) DR capsule 60 mg  60 mg Oral Nightly Anna Castillo DO   60 mg at 07/06/20 2043   • famotidine (PEPCID) tablet 40 mg  40 mg Oral Daily Radha Reed MD   40 mg at 07/07/20 0916   • ferrous sulfate tablet 325 mg  325 mg Oral Daily With Breakfast Radha Reed MD   325 mg at 07/07/20 0916   • fluticasone (FLONASE) 50 MCG/ACT nasal spray 2 spray  2 spray Each Nare Daily Radha Reed MD   2 spray at 07/07/20 0917   • glucagon (human recombinant) (GLUCAGEN DIAGNOSTIC) injection 1 mg  1 mg Subcutaneous Q15 Min PRN Radha Reed MD       • HYDROcodone-acetaminophen (NORCO) 5-325 MG per tablet 1 tablet  1 tablet Oral Q6H PRN Shelli Vega APRN   1 tablet at 07/07/20 0916   • insulin detemir (LEVEMIR) injection 10 Units  10 Units Subcutaneous Q12H Berna Mims MD   10 Units at 07/07/20 0954   • insulin lispro (humaLOG) injection 0-7 Units  0-7 Units Subcutaneous TID AC Radha Reed MD   5 Units at 07/07/20 1244   • ipratropium-albuterol (DUO-NEB) nebulizer solution 3 mL  3 mL Nebulization Q4H PRN Radha Reed MD       • isosorbide mononitrate (IMDUR) 24 hr tablet 30 mg  30 mg Oral Q24H Marie Maria APRN   30 mg at 07/07/20 0917   • levothyroxine (SYNTHROID, LEVOTHROID) tablet 50 mcg  50 mcg Oral Daily Radha Reed MD   50 mcg at 07/07/20 0759   • LORazepam  "(ATIVAN) tablet 1 mg  1 mg Oral Nightly PRN Anna Castillo, DO   1 mg at 07/06/20 2152   • losartan (COZAAR) tablet 50 mg  50 mg Oral Q24H Marie Maria APRN   50 mg at 07/07/20 0916   • morphine injection 2 mg  2 mg Intravenous Q4H PRN Anna Castillo, DO   2 mg at 07/06/20 2153   • ondansetron (ZOFRAN) tablet 4 mg  4 mg Oral Q6H PRN Radha Reed MD        Or   • ondansetron (ZOFRAN) injection 4 mg  4 mg Intravenous Q6H PRN Radha Reed MD   4 mg at 07/06/20 1310   • pregabalin (LYRICA) capsule 150 mg  150 mg Oral BID Berna Mims MD   150 mg at 07/07/20 1244   • sodium chloride 0.9 % flush 10 mL  10 mL Intravenous Q12H Blanca Tom APRN   10 mL at 07/07/20 0918   • sodium chloride 0.9 % flush 10 mL  10 mL Intravenous PRN Blanca Tom APRN       • sodium chloride 0.9 % flush 10 mL  10 mL Intravenous Q12H Radha Reed MD   10 mL at 07/06/20 2044   • sodium chloride 0.9 % flush 10 mL  10 mL Intravenous PRN Radha Reed MD       • trolamine salicylate (ASPERCREME) 10 % cream   Topical 4x Daily PRN Shelli Vega APRN            Physician Progress Notes (last 24 hours) (Notes from 07/06/20 1357 through 07/07/20 1357)      Brooklyn Giron APRN at 07/07/20 1001          Stroke Progress Note       Chief Complaint:  ble weakness, slurred speech    Subjective     Subjective:  Resting in bed. No headache, unilateral weakness but \"feels tired all over\".  C/o low back pain which is chronic, requesting muscle relaxer.      Review of Systems   Constitutional: Negative for chills and fever.   HENT: Negative for trouble swallowing.    Eyes: Negative for visual disturbance.   Respiratory: Negative for shortness of breath.    Cardiovascular: Negative for chest pain.   Gastrointestinal: Negative for nausea.   Musculoskeletal: Positive for back pain.   Skin: Negative.    Neurological: Positive for facial asymmetry and weakness. Negative for dizziness, numbness and headaches.    "     Objective      Temp:  [97.3 °F (36.3 °C)-98.5 °F (36.9 °C)] 97.8 °F (36.6 °C)  Heart Rate:  [70-79] 78  Resp:  [16-18] 18  BP: (131-162)/(61-83) 162/83    Neurological Exam  Mental Status  Awake, alert and oriented to person, place and time. Speech is normal. Language is fluent with no aphasia.    Cranial Nerves  CN II: Visual fields full to confrontation.  CN III, IV, VI: Extraocular movements intact bilaterally.  CN V: Facial sensation is normal.  CN VII: Full and symmetric facial movement.  CN VIII: Hearing is normal.  CN IX, X: Palate elevates symmetrically  CN XI: Shoulder shrug strength is normal.  CN XII: Tongue midline without atrophy or fasciculations.    Motor  Decreased muscle bulk throughout. No fasciculations present. Normal muscle tone. Strength is 5/5 throughout all four extremities.    Sensory  Light touch is normal in upper and lower extremities.     Coordination  Right: Finger-to-nose normal. Heel-to-shin normal.  Left: Finger-to-nose normal. Heel-to-shin normal.    Gait   Not observed.      Physical Exam   Neurological: She has normal strength.   Psychiatric: Her speech is normal.       Results Review:    I reviewed the patient's new clinical results.    Lab Results (last 24 hours)     Procedure Component Value Units Date/Time    POC Glucose Once [[id number]]  (Abnormal) Collected:  07/07/20 0805    Specimen:  Blood Updated:  07/07/20 0807     Glucose 256 mg/dL     POC Glucose Once [[id number]]  (Abnormal) Collected:  07/06/20 2000    Specimen:  Blood Updated:  07/06/20 2018     Glucose 294 mg/dL     POC Glucose Once [[id number]]  (Abnormal) Collected:  07/06/20 1601    Specimen:  Blood Updated:  07/06/20 1603     Glucose 236 mg/dL     POC Glucose Once [[id number]]  (Abnormal) Collected:  07/06/20 1108    Specimen:  Blood Updated:  07/06/20 1110     Glucose 323 mg/dL     Basic Metabolic Panel [[id number]]  (Abnormal) Collected:  07/06/20 0903    Specimen:  Blood Updated:  07/06/20 1046     Glucose  294 mg/dL      BUN 17 mg/dL      Creatinine 0.96 mg/dL      Sodium 139 mmol/L      Potassium 3.5 mmol/L      Comment: Specimen hemolyzed.  Results may be affected.        Chloride 102 mmol/L      CO2 25.0 mmol/L      Calcium 9.1 mg/dL      eGFR Non African Amer 56 mL/min/1.73      BUN/Creatinine Ratio 17.7     Anion Gap 12.0 mmol/L     Narrative:       GFR Normal >60  Chronic Kidney Disease <60  Kidney Failure <15      Heparin Anti-Xa [[id number]]  (Normal) Collected:  07/06/20 0903    Specimen:  Blood Updated:  07/06/20 1038     Heparin Anti-Xa (UFH) 0.57 IU/ml     Urine Culture - Urine, Urine, Clean Catch [[id number]]  (Normal) Collected:  07/05/20 0723    Specimen:  Urine, Clean Catch Updated:  07/06/20 1030     Urine Culture No growth    CBC (No Diff) [[id number]]  (Normal) Collected:  07/06/20 0903    Specimen:  Blood Updated:  07/06/20 1019     WBC 9.84 10*3/mm3      RBC 3.95 10*6/mm3      Hemoglobin 12.1 g/dL      Hematocrit 36.6 %      MCV 92.7 fL      MCH 30.6 pg      MCHC 33.1 g/dL      RDW 13.2 %      RDW-SD 44.4 fl      MPV 12.0 fL      Platelets 153 10*3/mm3         Ct Angiogram Chest With & Without Contrast    Result Date: 7/6/2020  Nonobstructing thrombus within the right mid and lower lobe pulmonary arteries suggesting pulmonary emboli. Atelectatic changes seen in the lung bases with bronchial wall thickening seen in the left lower lobe bronchioles. The RV-LV ratio is within normal limits.  D:  07/06/2020 E:  07/06/2020        Mri Brain Without Contrast    Result Date: 7/5/2020  1. Small focus of encephalomalacia in the left occipital lobe.. No evidence of acute infarction here or elsewhere. 2. Chronic-appearing changes of the aging brain. No evidence of acute intracranial disease.  DICTATED:   07/05/2020 EDITED/ls :   07/05/2020  This report was finalized on 7/5/2020 10:48 PM by Dr. Matt Malave MD.      Results for orders placed during the hospital encounter of 07/04/20   Adult Transthoracic Echo  Complete W/ Cont if Necessary Per Protocol    Narrative · Cardiac chamber sizes and wall thicknesses are normal. All wall   thicknesses are normal.  · Left ventricular systolic function appears to be at the lower limits of   normal to slightly depressed with an estimated ejection fraction of 51% -   55%.  · The trileaflet aortic valve has some degree of calcific changes.   Significant aortic stenosis/aortic insufficiency is not seen.  · The mitral valve is normal in structure and function.  · There is moderate tricuspid regurgitation with an estimated right   ventricular systolic pressure of 43 mmHg, sugesting moderately severe PA   hypertension.  · An agitated saline study reveals no evidence of intracardiac shunting.  · There are no other important findings on this study.            Assessment/Plan     Assessment/Plan:      79-year-old female with multiple vascular risk factors presented with unresponsiveness.  CT perfusion showed a left occipital perfusion deficit.  CTA head shows left PCA narrowing.  Patient is back to baseline now     1.  TIA versus stroke   -MRI brain revealed small focus of encephalomalacia in the left occipital lobe. No  evidence of acute infarction   - echo- EF 51-55%. Agitated saline study reveals no evidence of intracardiac shunting.  -Decrease ASA to 81mg daily and  Lipitor 80 mg daily for secondary stroke prevention   -We will sign off. Fu wit PCP in 2-4 weeks s/p discharge.     2.  leukocytosis r/t UTI   -on Rocephin     3. Acute DVT of LLE  -cont Eliquis  -cardiology managing.      4. Demand ischemia  -cardiology following  -Elevated troponins x2 are flat which is consistent with demand ischemia in the setting of TIA  -Cont isosorbide 30 mg daily per cardiology      5. HTN  -normal BP goals---Maintain blood pressure less than 130/90     6.HLD  -  -lipitor 80mg daily for secondary stroke prevention      7. DM 2, uncontrolled   -Goal A1c close to 6.5.  Her A1c was 8.2.  Diabetes  educator to see. Hospitalist managing meds, fu with pcp. May need to see endocrinology outpt.    8. PT/OT--waiting on SNF bed per case management.       Case discussed with nursing and Dr Mims.  We will sign off. Thank you for the consult.           KAIA Flores  07/07/20  10:01        Electronically signed by Brooklyn Giron APRN at 07/07/20 1012     Marie Maria APRN at 07/07/20 0829          [facility]  Cardiology Progress Note      Chief Complaint/Reason for service:    · PE/DVT  · Demand ischemia  · Hypertension    Subjective     Patient sitting in bed without complaints.  No events noted overnight.  She underwent CT angiogram of the chest which did show a nonobstructing thrombus within the right mid and lower lobe pulmonary arteries.  The RV-LV ratio was within normal limits.  Heparin drip was discontinued and the patient has been placed on anticoagulation with Eliquis.  No chest pain overnight.  She still remains hypertensive with a systolic in the 160s.  Records received from [facility] and were reviewed.    Past medical, surgical, social and family history reviewed in the patient's electronic medical record.      Objective   Vital Sign Min/Max for last 24 hours  Temp  Min: 97.3 °F (36.3 °C)  Max: 98.5 °F (36.9 °C)   BP  Min: 131/77  Max: 162/83   Pulse  Min: 70  Max: 79   Resp  Min: 16  Max: 18   SpO2  Min: 91 %  Max: 96 %   No data recorded    No intake or output data in the 24 hours ending 07/07/20 0833        Physical Exam   Constitutional: She is oriented to person, place, and time. She appears well-developed and well-nourished.   HENT:   Head: Normocephalic.   Neck: No JVD present. Carotid bruit is not present.   Cardiovascular: Normal rate, regular rhythm, normal heart sounds and intact distal pulses. Exam reveals no gallop and no friction rub.   No murmur heard.  Pulmonary/Chest: Effort normal and breath sounds normal.   Abdominal: Soft.    Musculoskeletal: She exhibits edema.   Left leg 2+ edema   Neurological: She is alert and oriented to person, place, and time.   Skin: Skin is warm and dry. No cyanosis. Nails show no clubbing.   Psychiatric: She has a normal mood and affect. Her behavior is normal.       Tele: Normal sinus rhythm    Results Review (reviewed the patient's recent labs in the electronic medical record):     EKG (7/4/2020): Sinus rhythm with first-degree AV block    CT angiogram of the chest (7/6/2020): Nonobstructing thrombus within right mid and lower lobe pulmonary artery suggesting pulmonary emboli    ECHO (7/5/2020): LVEF 51-55%.  Moderate TR with RVSP 43 mmHg      Results from last 7 days   Lab Units 07/06/20  0903 07/05/20  0513 07/05/20  0055   SODIUM mmol/L 139 138 138   POTASSIUM mmol/L 3.5 3.2* 3.5   CHLORIDE mmol/L 102 101 99   BUN mg/dL 17 22 23   CREATININE mg/dL 0.96 1.10* 1.24*     Results from last 7 days   Lab Units 07/05/20  0513 07/05/20  0055   TROPONIN T ng/mL 0.411* 0.447*     Results from last 7 days   Lab Units 07/06/20  0903 07/05/20  0513 07/05/20  0055   WBC 10*3/mm3 9.84 12.87* 15.43*   HEMOGLOBIN g/dL 12.1 11.4* 12.3   HEMATOCRIT % 36.6 35.4 37.7   PLATELETS 10*3/mm3 153 138* 144       Lab Results   Component Value Date    HGBA1C 8.10 (H) 07/05/2020    HGBA1C 8.20 (H) 07/05/2020       Lab Results   Component Value Date    CHOL 312 (H) 07/05/2020    TRIG 176 (H) 07/05/2020    HDL 44 07/05/2020     (H) 07/05/2020         Assessment     Active Hospital Problems    Diagnosis POA   • **Left-sided weakness [R53.1] Yes     Priority: High     · Transfer from [facility] for signs symptoms concerning of CVA on 7/4/2020  · CT of the head and neck (7/4/2020): Perfusion deficit in the left occipital region and some stenosis in the left PCA as well as vertebrals but no significant intracarotid stenosis  · MRI (7/5/2020): Unremarkable no acute intracranial disease     • Acute  pulmonary embolism without acute cor pulmonale (CMS/[facility]) [I26.99] Yes     Priority: High     · CT angiogram of the chest (7/6/2020):Nonobstructing thrombus within the right mid and lower lobe pulmonary arteries suggesting pulmonary emboli. The RV-LV ratio is within normal limits.     • History of CVA (cerebrovascular accident) [Z86.73] Not Applicable     Priority: High   • CKD (chronic kidney disease) stage 3, GFR 30-59 ml/min (CMS/[facility]) [N18.3] Yes     Priority: High   • Demand ischemia (CMS/[facility]) [I24.8] Yes     Priority: High   • Uncontrolled type 2 diabetes mellitus (CMS/[facility]) [E11.65] Yes     Priority: High   • Acute DVT (deep venous thrombosis) (CMS/[facility]) [I82.409] Yes     Priority: High     · Lower extremity venous duplex (7/5/2020):  Acute left extremity DVT in the common femoral, proximal femoral, mid femoral, distal femoral, popliteal, posterior tibial, peroneal and gastrocnemius     • Acute hypoxemic respiratory failure (CMS/[facility]) [J96.01] Yes     Priority: High   • Coronary artery disease involving native coronary artery of native heart without angina pectoris [I25.10] Yes     Priority: Medium     · Cardiac cath for NSTEMI at [facility] (1/9/2017): Multivessel CAD.  High risk PCI preferred over CABG due to multiple comorbidities.  PCI of LAD.  PTCA of third diagonal.  PCI of mid circumflex.  · Echo (7/5/2020): LVEF 51-55%.  Moderate TR.  RVSP 43 mmHg     • Chronic heart failure (CMS/[facility]) [I50.9] Yes     Priority: Medium     · Echo (7/5/2020): LVEF 51-55%.  Moderate TR.  RVSP 43 mmHg.     • Hyperlipidemia LDL goal <70 [E78.5] Yes     Priority: Low     · High intensity statin therapy indicated given presence of CAD and history of CVA     • Essential hypertension [I10] Yes     Priority: Low   • Acute UTI (urinary tract infection) [N39.0] Yes     Priority: Low         Plan     Acute DVT of left lower extremity/PE  · Anticoagulation is gold standard for treatment of DVTs. Reserve invasive therapies  including mechanical thrombectomy and/or catheter infused TPA for DVT's that are refractory to anticoagulation therapy.  Please contact us if left leg edema and pain persist despite anticoagulation.  · IV heparin has been transitioned to Eliquis.  · CT angiogram of the chest shows nonobstructing thrombus in the right mid and lower lobe pulmonary arteries suggesting PE.  No evidence of RV strain          TIA versus CVA  · CT angio of the head and neck does not indicate acute CVA  · MRI does not indicate acute CVA  · Currently on full strength aspirin and statin therapy  · Consider discontinuing aspirin since on anticoagulation with Eliquis     Demand ischemia  · Elevated troponins x2 are flat which is consistent with demand ischemia in the setting of TIA  · Patient reports progressive angina over the past 3 months requiring increased use of sublingual nitro   · [facility] received [facility].  Patient underwent high risk PCI 2017.  · Patient prefers medical management for her CAD and does not want invasive procedures or cardiac caths  · No chest pain overnight.  Continue isosorbide     Heart failure with preserved EF  · Appears compensated  · Echo shows normal LVEF     Hypertension  · Blood pressures not well controlled currently systolic in the 160s  · On Coreg 12.5 mg twice daily  · Start home dose losartan     Hyperlipidemia  · Lipitor 80 mg daily  · LDL not at goal 233     Uncontrolled diabetes mellitus  · Hemoglobin A1c greater than 8.0  · Receiving subcu insulin  · Hospitalist managing on insulin     Leukocytosis  · History of recurrent UTI  · Receiving antibiotics and management per hospitalist     Stage III chronic kidney disease  · Stable with creatinine 0.96      Please call with any additional questions.  Patient needs to establish cardiology care which I have discussed with her.  Her daughter was not present this morning.  She has not been following a cardiologist since her high risk  PCI.  She can follow-up with me in 4 weeks or establish care with a cardiology provider in her area.    KAIA Valdes  2020    Electronically signed by Marie Maria APRN at 20 0839     Berna Mims MD at 20 0824              [facility] Medicine Services  PROGRESS NOTE    Patient Name: Steffen Zeng  : 1940  MRN: [id number]    Date of Admission: 2020  Primary Care Physician: Provider, No Known    Subjective   Subjective     CC: Follow-up with LLE DVT    HPI: No acute events overnight, patient said that she slept well, said that her feet feel much better.  She does endorse very mild pain.    Review of Systems  Gen- No fevers, chills  CV- No chest pain, palpitations  Resp- No cough, dyspnea  GI- No N/V/D, abd pain    All systems reviewed currently negative except as mentioned in HPI above    Objective   Objective     Vital Signs:   Temp:  [97.3 °F (36.3 °C)-98.5 °F (36.9 °C)] 97.8 °F (36.6 °C)  Heart Rate:  [70-79] 78  Resp:  [16-18] 18  BP: (131-162)/(61-83) 162/83  Total (NIH Stroke Scale): 1     Physical Exam:  Constitutional: Elderly lady, in no acute distress, awake, alert  HENT: NCAT, mucous membranes moist  Respiratory: Clear to auscultation bilaterally, respiratory effort normal   Cardiovascular: RRR, no murmurs, rubs, or gallops, palpable pedal pulses bilaterally  Gastrointestinal: Positive bowel sounds, soft, nontender, nondistended  Musculoskeletal: No bilateral ankle edema right lower extremity, left lateral ankle wounds, under dressing  Psychiatric: Appropriate affect, cooperative  Neurologic: Oriented x 3, no focal deficits, tremors  Skin: No rashes    Results Reviewed:  Results from last 7 days   Lab Units 20  0903 20  1045 20  0513 20  0055   WBC 10*3/mm3 9.84  --  12.87* 15.43*   HEMOGLOBIN g/dL 12.1  --  11.4* 12.3   HEMATOCRIT % 36.6  --  35.4 37.7   PLATELETS 10*3/mm3 153  --  138* 144   INR   --  1.12  --  1.16    PROCALCITONIN ng/mL  --   --   --  7.52*     Results from last 7 days   Lab Units 07/06/20  0903 07/05/20  0513 07/05/20  0055   SODIUM mmol/L 139 138 138   POTASSIUM mmol/L 3.5 3.2* 3.5   CHLORIDE mmol/L 102 101 99   CO2 mmol/L 25.0 23.0 25.0   BUN mg/dL 17 22 23   CREATININE mg/dL 0.96 1.10* 1.24*   GLUCOSE mg/dL 294* 284* 334*   CALCIUM mg/dL 9.1 8.8 8.8   ALT (SGPT) U/L  --  35* 37*   AST (SGOT) U/L  --  60* 61*   TROPONIN T ng/mL  --  0.411* 0.447*     Estimated Creatinine Clearance: 55.6 mL/min (by C-G formula based on SCr of 0.96 mg/dL).    Microbiology Results Abnormal     Procedure Component Value - Date/Time    Urine Culture - Urine, Urine, Clean Catch [[id number]]  (Normal) Collected:  07/05/20 0723    Lab Status:  Final result Specimen:  Urine, Clean Catch Updated:  07/06/20 1030     Urine Culture No growth    COVID PRE-OP / PRE-PROCEDURE SCREENING ORDER (NO ISOLATION) - Swab, Nasopharynx [[id number]] Collected:  07/05/20 0137    Lab Status:  Final result Specimen:  Swab from Nasopharynx Updated:  07/05/20 0249    Narrative:       The following orders were created for panel order COVID PRE-OP / PRE-PROCEDURE SCREENING ORDER (NO ISOLATION) - Swab, Nasopharynx.  Procedure                               Abnormality         Status                     ---------                               -----------         ------                     COVID-19,CEPHEID,MYA IN-...[[id number]]  Normal              Final result                 Please view results for these tests on the individual orders.    COVID-19,CEPHEID,MYA IN-HOUSE(OR EMERGENT/ADD-ON),NP SWAB IN TRANSPORT MEDIA 3-4 HR TAT - Swab, Nasopharynx [[id number]]  (Normal) Collected:  07/05/20 0137    Lab Status:  Final result Specimen:  Swab from Nasopharynx Updated:  07/05/20 0249     COVID19 Not Detected    Narrative:       Fact sheet for providers: https://www.fda.gov/media/793211/download     Fact sheet for patients: https://www.fda.gov/media/244239/download             Imaging Results (Last 24 Hours)     Procedure Component Value Units Date/Time    CT Angiogram Chest With & Without Contrast [[id number]] Collected:  07/06/20 1451     Updated:  07/06/20 1843    Narrative:       EXAMINATION: CT ANGIOGRAM CHEST W WO CONTRAST-07/06/2020:      INDICATION: DVT; Z74.09-Other reduced mobility; Z78.9-Other specified  health status, chest pain, hypoxia.     TECHNIQUE: Multiple axial CT imaging was obtained of the chest with and  without the administration of intravenous contrast.     The radiation dose reduction device was turned on for each scan per the  ALARA (As Low as Reasonably Achievable) protocol.     COMPARISON: NONE.     FINDINGS: The thyroid is enlarged and heterogeneous in appearance. Small  lymph nodes seen scattered throughout the mediastinum. Filling defects  seen in the right mid and lower lobe pulmonary arteries suggesting  thrombus and pulmonary emboli. The pulmonary emboli are incompletely  obstructing. RV-LV ratio is within normal limits. There are  bronchiectatic changes and wall thickening identified of the bronchioles  in the left lower lobe. There are mild increased markings suggesting  atelectatic change in the lung bases bilaterally. No dense  consolidation. No definite pleural effusion. Degenerative changes seen  within the spine. The visualized upper abdomen is unremarkable.       Impression:       Nonobstructing thrombus within the right mid and lower lobe  pulmonary arteries suggesting pulmonary emboli. Atelectatic changes seen  in the lung bases with bronchial wall thickening seen in the left lower  lobe bronchioles. The RV-LV ratio is within normal limits.     D:  07/06/2020  E:  07/06/2020                   Results for orders placed during the hospital encounter of 07/04/20   Adult Transthoracic Echo Complete W/ Cont if Necessary Per Protocol    Narrative · Cardiac chamber sizes and wall thicknesses are normal. All wall   thicknesses are normal.  · Left  ventricular systolic function appears to be at the lower limits of   normal to slightly depressed with an estimated ejection fraction of 51% -   55%.  · The trileaflet aortic valve has some degree of calcific changes.   Significant aortic stenosis/aortic insufficiency is not seen.  · The mitral valve is normal in structure and function.  · There is moderate tricuspid regurgitation with an estimated right   ventricular systolic pressure of 43 mmHg, sugesting moderately severe PA   hypertension.  · An agitated saline study reveals no evidence of intracardiac shunting.  · There are no other important findings on this study.          I have reviewed the medications:  Scheduled Meds:    amitriptyline 10 mg Oral Nightly   apixaban 5 mg Oral Q12H   aspirin 325 mg Oral Daily   Or      aspirin 300 mg Rectal Daily   atorvastatin 80 mg Oral Nightly   carvedilol 12.5 mg Oral BID With Meals   castor oil-balsam peru  Topical Q12H   cefTRIAXone 1 g Intravenous Q24H   DULoxetine 60 mg Oral Nightly   famotidine 40 mg Oral Daily   ferrous sulfate 325 mg Oral Daily With Breakfast   fluticasone 2 spray Each Nare Daily   insulin detemir 10 Units Subcutaneous Q12H   insulin lispro 0-7 Units Subcutaneous TID AC   isosorbide mononitrate 30 mg Oral Q24H   levothyroxine 50 mcg Oral Daily   losartan 50 mg Oral Q24H   sodium chloride 10 mL Intravenous Q12H   sodium chloride 10 mL Intravenous Q12H     Continuous Infusions:   PRN Meds:.•  acetaminophen **OR** acetaminophen **OR** acetaminophen  •  acetaminophen  •  cyclobenzaprine  •  dextrose  •  dextrose  •  docusate sodium  •  glucagon (human recombinant)  •  HYDROcodone-acetaminophen  •  ipratropium-albuterol  •  LORazepam  •  Morphine  •  ondansetron **OR** ondansetron  •  sodium chloride  •  sodium chloride  •  trolamine salicylate    Assessment/Plan   Assessment & Plan     Active Hospital Problems    Diagnosis  POA   • **Left-sided weakness [R53.1]  Yes   • Acute pulmonary embolism  without acute cor pulmonale (CMS/[facility]) [I26.99]  Yes   • History of CVA (cerebrovascular accident) [Z86.73]  Not Applicable   • Hyperlipidemia LDL goal <70 [E78.5]  Yes   • CKD (chronic kidney disease) stage 3, GFR 30-59 ml/min (CMS/[facility]) [N18.3]  Yes   • Demand ischemia (CMS/[facility]) [I24.8]  Yes   • Essential hypertension [I10]  Yes   • Coronary artery disease involving native coronary artery of native heart without angina pectoris [I25.10]  Yes   • Chronic heart failure (CMS/[facility]) [I50.9]  Yes   • Uncontrolled type 2 diabetes mellitus (CMS/[facility]) [E11.65]  Yes   • Acute UTI (urinary tract infection) [N39.0]  Yes   • Acute DVT (deep venous thrombosis) (CMS/[facility]) [I82.409]  Yes   • Acute hypoxemic respiratory failure (CMS/H[facility]) [J96.01]  Yes      Resolved Hospital Problems    Diagnosis Date Resolved POA   • NSTEMI, initial episode of care (CMS/[facility]) [I21.4] 07/06/2020 Yes        Brief Hospital Course to date:  Steffen Zeng is a 79 y.o. female with past medical history of CKD stage III, hyperlipidemia type 2 diabetes, hypertension.  Patient presented with left-sided weakness, initially concerning for CVA, however venous duplex did reveal acute left lower extremity DVT.  She is currently on Eliquis.    Plan  Acute left lower extremity DVT and PE  CTA chest shows nonobstructing thrombus in right mid and lower pulmonary arteries suggesting PE no evidence of RV strain  -continue Eliquis    Left-sided weakness, ?TIA  -Suspect this was secondary to above, not CVA , however CT perfusion was positive for ischemic changes in the left occipital lobe, CTA head and neck were unrevealing, MRI head did show small focus of encephalomalacia no evidence of acute infarction  -Cards rec d/c ASA as she is now on Eliquis  -Neurology following    CKD stage III-renal function seems to be at baseline, continue to monitor, avoid nephrotoxins.    Hypertension-BP currently elevated, currently on Coreg and Imdur, cardiology has started low-dose losartan,  will continue for now adjust as appropriate    Heart failure with preserved EF  -Currently compensated, continue beta-blocker    Poorly controlled type 2 diabetes with A1c 8.2%  -FSBG's have been reviewed and currently suboptimal  -Increase basal Levemir to 10 units bid, continue SSI    Suspected UTI-urine cultures NGTD, will DC Rocephin she has received 3 days of antibiotics    Hypothyroidism-continue Synthroid    Neuropathy-previously on Tegretol, still discontinued moving forward, some interaction with Eliquis    Right lower extremity, left lateral ankle wounds-WOC following    Chronic severe back pain/spinal stenosis    Anxiety/depression-continue Cymbalta    DVT Prophylaxis: Eliquis    Disposition: TBD    All problems listed above are new to me as this is my first encounter with patient    CODE STATUS:   Code Status and Medical Interventions:   Ordered at: 20 0205     Limited Support to NOT Include:    Cardioversion/Defibrillation    Intubation     Level Of Support Discussed With:    Next of Kin (If No Surrogate)     Code Status:    No CPR     Medical Interventions (Level of Support Prior to Arrest):    Limited     Comments:    Discussed with daughter at length, patient never wished to be on the ventilator or wanted CPR.         Electronically signed by Berna Mims MD, 20, 10:04.      Electronically signed by Berna Mims MD at 20 1004       Physical Therapy Notes (last 48 hours) (Notes from 20 1358 through 20 1358)    No notes exist for this encounter.          Occupational Therapy Notes (last 48 hours) (Notes from 20 1358 through 20 1358)      Bella Campbell, OT at 20 1012          Acute Care - Occupational Therapy Initial Evaluation  [facility]     Patient Name: Steffen Zeng  : 1940  MRN: [id number]  Today's Date: 2020             Admit Date: 2020       ICD-10-CM ICD-9-CM   1. Impaired mobility and ADLs Z74.09 V49.89    Z78.9           Patient Active Problem List   Diagnosis   • Acute hypoxemic respiratory failure (CMS/[facility])   • Essential hypertension   • Coronary artery disease involving native coronary artery of native heart without angina pectoris   • Chronic heart failure (CMS/[facility])   • Uncontrolled type 2 diabetes mellitus (CMS/[facility])   • Left-sided weakness   • Acute UTI (urinary tract infection)   • Acute DVT (deep venous thrombosis) (CMS/[facility])   • History of CVA (cerebrovascular accident)   • NSTEMI, initial episode of care (CMS/[facility])   • Hyperlipidemia LDL goal <70     Past Medical History:   Diagnosis Date   • CHF (congestive heart failure) (CMS/[facility])    • Coronary artery disease    • Diabetes mellitus (CMS/[facility])    • Disease of thyroid gland    • Elevated cholesterol    • Hypertension    • Stroke (CMS/[facility])      Past Surgical History:   Procedure Laterality Date   • APPENDECTOMY     • CARDIAC CATHETERIZATION     •  SECTION     • CHOLECYSTECTOMY     • HYSTERECTOMY     • KNEE ARTHROSCOPY Left           OT ASSESSMENT FLOWSHEET (last 12 hours)      Occupational Therapy Evaluation     Row Name 20 1012                   OT Evaluation Time/Intention    Subjective Information  complains of;pain  -JAGDEEP        Document Type  evaluation  -JAGDEEP        Mode of Treatment  individual therapy;occupational therapy  -JAGDEEP        Patient Effort  good  -JAGDEEP           General Information    Patient Profile Reviewed?  yes  -JAGDEEP        Patient Observations  alert;cooperative;agree to therapy  -JAGDEEP        Patient/Family Observations  Pt. supine in bed with dtr., who is a nurse present.  -JAGDEEP        General Observations of Patient  Pt. sitting up in bed.  -JAGDEEP        Prior Level of Function  independent:;all household mobility;ADL's;max assist:;dependent:;home management;driving;shopping pt. heats up pre-prepared meals.  Per family multiple falls.  -JAGDEEP        Equipment Currently Used at Home  rollator;ramp;grab bar;bath bench  -JAGDEPE        Existing  Precautions/Restrictions  fall DVT and on bedrest minus to bed  -JAGDEEP        Limitations/Impairments  hearing  -JAGDEEP        Risks Reviewed  LOB;increased discomfort;lines disloged;patient:;family:  -JAGDEEP        Benefits Reviewed  patient:;family:;improve function;increase independence;increase strength;increase balance;increase knowledge  -JAGDEEP        Barriers to Rehab  previous functional deficit  -JAGDEEP           Relationship/Environment    Lives With  alone  -JAGDEEP        Family Caregiver if Needed  child(mainor), adult dtr works and with children so not available at all times.  -JAGDEEP           Resource/Environmental Concerns    Current Living Arrangements  home/apartment/condo  -JAGDEEP           Cognitive Assessment/Intervention- PT/OT    Orientation Status (Cognition)  oriented x 3  -JAGDEEP        Follows Commands (Cognition)  follows one step commands;over 90% accuracy;repetition of directions required limited by [place]  -JAGDEEP        Safety Deficit (Cognitive)  safety precautions awareness;safety precautions follow-through/compliance;insight into deficits/self awareness  -JAGDEEP           Safety Issues, Functional Mobility    Safety Issues Affecting Function (Mobility)  safety precautions follow-through/compliance;safety precaution awareness;insight into deficits/self awareness  -JAGDEEP        Impairments Affecting Function (Mobility)  balance;endurance/activity tolerance;strength;shortness of breath  -JAGDEEP           Bed Mobility Assessment/Treatment    Bed Mobility Assessment/Treatment  supine-sit;sit-supine;scooting/bridging  -JAGDEEP        Scooting/Bridging [place] (Bed Mobility)  2 person assist;dependent (less than 25% patient effort) to HOB  -JAGDEEP        Supine-Sit [place] (Bed Mobility)  moderate assist (50% patient effort);2 person assist;nonverbal cues (demo/gesture);verbal cues  -JAGDEEP        Sit-Supine [place] (Bed Mobility)  moderate assist (50% patient effort);2 person assist;nonverbal cues (demo/gesture);verbal cues  -JAGDEEP        Bed  Mobility, Safety Issues  decreased use of arms for pushing/pulling;decreased use of legs for bridging/pushing;impaired trunk control for bed mobility  -JAGDEEP        Assistive Device (Bed Mobility)  bed rails;head of bed elevated  -JAGDEEP        Comment (Bed Mobility)  per dtr. pt. only sleep in a recliner at home.  -JAGDEEP           Functional Mobility    Functional Mobility- Comment  pt. only allowed transfers to [facility].  -JAGDEEP           Transfer Assessment/Treatment    Transfer Assessment/Treatment  toilet transfer;stand-sit transfer;sit-stand transfer  -JAGDEEP           Sit-Stand Transfer    Sit-Stand [place] (Transfers)  moderate assist (50% patient effort);nonverbal cues (demo/gesture);verbal cues  -JAGDEEP        Assistive Device (Sit-Stand Transfers)  other (see comments) UE support  -JAGDEEP           Stand-Sit Transfer    Stand-Sit [place] (Transfers)  minimum assist (75% patient effort);2 person assist;nonverbal cues (demo/gesture);verbal cues  -JAGDEEP        Assistive Device (Stand-Sit Transfers)  other (see comments) gait belt and UE support  -JAGDEEP           Toilet Transfer    Type (Toilet Transfer)  stand-sit;sit-stand  -JAGDEEP        [place] Level (Toilet Transfer)  moderate assist (50% patient effort);nonverbal cues (demo/gesture);verbal cues  -JAGDEEP        Assistive Device (Toilet Transfer)  commode, bedside without drop arms  -           ADL Assessment/Intervention    98523 - OT Self Care/Mgmt Minutes  5  -JAGDEEP        BADL Assessment/Intervention  grooming;upper body dressing;bathing;toileting  -           Bathing Assessment/Intervention    Comment (Bathing)  dtr washed pt.s UB while sitting on BSC and while OT helping pt. back to bed washed aguilar area and buttocks.  -JAGDEEP           Lower Body Dressing Assessment/Training    Lower Body Dressing [place] Level  doff;don;socks;dependent (less than 25% patient effort)  -JAGDEEP        Comment (Lower Body Dressing)  per drt. pt. wears slip on shoes at home or gripper socks.  -JAGDEEP            Grooming Assessment/Training    [place] Level (Grooming)  grooming skills;oral care regimen;set up SBA  -JAGDEEP        Grooming Position  -- while sitting on BSC using bathroom  -JAGDEEP           Toileting Assessment/Training    Comment (Toileting)  dependent wiping, dtr. cleaned when OT helped pt. stand.  -JAGDEEP           BADL Safety/Performance    Impairments, BADL Safety/Performance  balance;endurance/activity tolerance;strength;trunk/postural control  -JAGDEEP        Skilled BADL Treatment/Intervention  cognitive/safety deficit modifications  -JAGDEEP           General ROM    GENERAL ROM COMMENTS  WFL AROM BUE  -JAGDEEP           MMT (Manual Muscle Testing)    General MMT Comments  BUE grossly 5/5  -JAGDEEP           Motor Assessment/Interventions    Additional Documentation  Balance (Group);Balance Interventions (Group);Therapeutic Exercise (Group);Therapeutic Exercise Interventions (Group)  -JAGDEEP           Therapeutic Exercise    67424 - OT Therapeutic Exercise Minutes  8  -JAGDEEP        41471 - OT Therapeutic Activity Minutes  5  -JAGDEEP           Therapeutic Exercise    Upper Extremity Range of Motion (Therapeutic Exercise)  shoulder flexion/extension, bilateral;shoulder abduction/adduction, bilateral;shoulder horizontal abduction/adduction, bilateral;elbow flexion/extension, bilateral  -JAGDEEP        Exercise Type (Therapeutic Exercise)  AROM (active range of motion);resistive exercises  -JAGDEEP        Position (Therapeutic Exercise)  supine  -JAGDEEP        Sets/Reps (Therapeutic Exercise)  1/15  -JAGDEEP        Expected Outcome (Therapeutic Exercise)  improve functional tolerance, self-care activity;improve performance, transfer skills;improve performance, BADLs  -JAGDEEP        Comment (Therapeutic Exercise)  3 rest periods due to SOA  -JAGDEEP           Balance    Balance  static sitting balance;static standing balance;dynamic sitting balance;dynamic standing balance  -JAGDEEP           Static Sitting Balance    Level of [place] (Unsupported Sitting, Static  Balance)  supervision  -JAGDEEP        Sitting Position (Unsupported Sitting, Static Balance)  sitting on edge of bed  -JAGDEEP           Dynamic Sitting Balance    Level of [place], Reaches Outside Midline (Sitting, Dynamic Balance)  standby assist  -JAGDEEP        Sitting Position, Reaches Outside Midline (Sitting, Dynamic Balance)  -- BSC  -JAGDEEP           Static Standing Balance    Level of [place] (Supported Standing, Static Balance)  minimal assist, 75% patient effort  -JAGDEEP        Time Able to Maintain Position (Supported Standing, Static Balance)  other (see comments) UE support  -JAGDEEP           Sensory Assessment/Intervention    Sensory General Assessment  no sensation deficits identified in BUE, per dtr. neuropathy BLE  -JAGDEEP        Additional Documentation  Hearing Assessment (Group)  -JAGDEEP           Hearing Assessment    Hearing Status  hearing impairment, bilaterally needs statements at times repeated several times  -JAGDEEP           Positioning and Restraints    Pre-Treatment Position  in bed  -JAGDEEP        Post Treatment Position  bed  -JAGDEEP        In Bed  supine;call light within reach;encouraged to call for assist;exit alarm on;with family/caregiver;legs elevated  -JAGDEEP           Pain Assessment    Additional Documentation  Pain Scale: Numbers Pre/Post-Treatment (Group)  -JAGDEEP           Pain Scale: Numbers Pre/Post-Treatment    Pain Scale: Numbers, Pretreatment  9/10 pt. appears to overrate pain based on ability and facial exp  -JAGDEEP        Pain Scale: Numbers, Post-Treatment  9/10  -JAGDEEP        Pain Location - Side  Bilateral  -JAGDEEP        Pain Location - Orientation  lower  -JAGDEEP        Pain Location  back BLE's  -JAGDEEP        Pre/Post Treatment Pain Comment  chronic back pain and BLE pain  -JAGDEEP        Pain Intervention(s)  Repositioned;Medication (See MAR)  -JAGDEEP           Wound 07/05/20 0058 Left lower;anterior ankle Pressure Injury    Wound - Properties Group Date first assessed: 07/05/20  -MP Time first assessed: 0058  -MP Side: Left   -MP Orientation: lower;anterior  -MP Location: ankle  -MP Primary Wound Type: Pressure inj  -MP Stage, Pressure Injury: Stage 2  -MP       Wound 07/05/20 0058 Right anterior;lower leg Skin Tear    Wound - Properties Group Date first assessed: 07/05/20 -MP Time first assessed: 0058 -MP Side: Right  -MP Orientation: anterior;lower  -MP Location: leg  -MP Primary Wound Type: Skin tear  -MP Stage, Pressure Injury: unstageable  -MP       Wound 07/05/20 0058 Right medial gluteal Pressure Injury    Wound - Properties Group Date first assessed: 07/05/20 -MP Time first assessed: 0058 -MP Side: Right  -MP Orientation: medial  -MP Location: gluteal  -MP Primary Wound Type: Pressure inj  -MP Stage, Pressure Injury: Stage 2  -MP       Clinical Impression (OT)    OT Diagnosis  impaired ADL independence  -JAGDEEP        Patient/Family Goals Statement (OT Eval)  return to PLOF  -JAGDEEP        Criteria for Skilled Therapeutic Interventions Met (OT Eval)  yes;treatment indicated  -JAGDEEP        Rehab Potential (OT Eval)  good, to achieve stated therapy goals  -JAGDEEP        Therapy Frequency (OT Eval)  daily  -JAGDEEP        Care Plan Review (OT)  evaluation/treatment results reviewed;care plan/treatment goals reviewed;risks/benefits reviewed;current/potential barriers reviewed;patient/other agree to care plan  -JAGDEEP        Care Plan Review, Other Participant (OT Eval)  daughter  -JAGDEEP        Anticipated Discharge Disposition (OT)  skilled nursing facility  -JAGDEEP           Vital Signs    Pre Systolic BP Rehab  164  -JAGDEEP        Pre Treatment Diastolic BP  58  -JAGDEEP        Post Systolic BP Rehab  171  -JAGDEEP        Post Treatment Diastolic BP  80  -JAGDEEP        Pretreatment Heart Rate (beats/min)  77  -JAGDEEP        Posttreatment Heart Rate (beats/min)  85  -JAGDEEP        Pre SpO2 (%)  96  -JAGDEEP        O2 Delivery Pre Treatment  room air  -JAGDEEP        Post SpO2 (%)  98  -JAGDEEP        O2 Delivery Post Treatment  room air  -JAGDEEP        Pre Patient Position  Supine  -JAGDEEP        Intra  Patient Position  Standing  -JAGDEEP        Post Patient Position  Supine  -JAGDEEP           Planned OT Interventions    Planned Therapy Interventions (OT Eval)  activity tolerance training;BADL retraining;functional balance retraining;occupation/activity based interventions;patient/caregiver education/training;ROM/therapeutic exercise;strengthening exercise;transfer/mobility retraining  -JAGDEEP           OT Goals    Transfer Goal Selection (OT)  transfer, OT goal 1  -JAGDEEP        Dressing Goal Selection (OT)  dressing, OT goal 1  -JAGDEEP        Toileting Goal Selection (OT)  toileting, OT goal 1  -JAGDEEP        Grooming Goal Selection (OT)  grooming, OT goal 1  -JAGDEEP        Additional Documentation  Grooming Goal Selection (OT) (Row)  -JAGDEEP           Transfer Goal 1 (OT)    Activity/Assistive Device (Transfer Goal 1, OT)  sit-to-stand/stand-to-sit;bed-to-chair/chair-to-bed;toilet;commode, bedside without drop arms;walker, rolling  -JAGDEEP        [place] Level/Cues Needed (Transfer Goal 1, OT)  minimum assist (75% or more patient effort);tactile cues required;verbal cues required  -JAGDEEP        Time Frame (Transfer Goal 1, OT)  long term goal (LTG);10 days  -JAGDEEP        Progress/Outcome (Transfer Goal 1, OT)  goal ongoing  -JAGDEEP           Dressing Goal 1 (OT)    Activity/Assistive Device (Dressing Goal 1, OT)  upper body dressing;lower body dressing AE prn  -JAGDEEP        [place]/Cues Needed (Dressing Goal 1, OT)  minimum assist (75% or more patient effort);verbal cues required;tactile cues required  -JAGDEEP        Time Frame (Dressing Goal 1, OT)  long term goal (LTG);10 days  -JAGDEEP        Progress/Outcome (Dressing Goal 1, OT)  goal ongoing  -JAGDEEP           Toileting Goal 1 (OT)    Activity/Device (Toileting Goal 1, OT)  toileting skills, all;commode, bedside without drop arms;grab bar/safety frame;raised toilet seat  -JAGDEEP        [place] Level/Cues Needed (Toileting Goal 1, OT)  moderate assist (50-74% patient effort)  -JAGDEEP        Time Frame  (Toileting Goal 1, OT)  long term goal (LTG);10 days  -JAGDEEP        Progress/Outcome (Toileting Goal 1, OT)  goal ongoing  -JAGDEEP           Grooming Goal 1 (OT)    Activity/Device (Grooming Goal 1, OT)  grooming skills, all sink side  -JAGDEEP        [place] (Grooming Goal 1, OT)  set-up required;contact guard assist  -JAGDEEP        Time Frame (Grooming Goal 1, OT)  long term goal (LTG);10 days  -JAGDEEP        Progress/Outcome (Grooming Goal 1, OT)  goal ongoing  -JAGDEEP           Living Environment    Home Accessibility  tub/shower is not walk in ramp, bar by toilet  -JAGDEEP          User Key  (r) = Recorded By, (t) = Taken By, (c) = Cosigned By    Initials Name Effective Dates    Bella Chamorro, OT 06/08/18 -     Brandt Kennedy RN 11/20/19 -          Occupational Therapy Education                 Title: PT OT SLP Therapies (In Progress)     Topic: Occupational Therapy (In Progress)     Point: ADL training (Done)     Description:   Instruct learner(s) on proper safety adaptation and remediation techniques during self care or transfers.   Instruct in proper use of assistive devices.              Learning Progress Summary           Patient Acceptance, E,D, VU,NR by JAGDEEP at 7/6/2020 1012    Comment:  reason for consult, evaluation results, goal setting, UE TE, transfer safety   Family Acceptance, E,D, VU,NR by JAGDEEP at 7/6/2020 1012    Comment:  reason for consult, evaluation results, goal setting, UE TE, transfer safety                   Point: Home exercise program (Done)     Description:   Instruct learner(s) on appropriate technique for monitoring, assisting and/or progressing therapeutic exercises/activities.              Learning Progress Summary           Patient Acceptance, E,D, VU,NR by JAGDEEP at 7/6/2020 1012    Comment:  reason for consult, evaluation results, goal setting, UE TE, transfer safety   Family Acceptance, E,D, VU,NR by JAGDEEP at 7/6/2020 1012    Comment:  reason for consult, evaluation results, goal setting, UE TE,  transfer safety                   Point: Precautions (Done)     Description:   Instruct learner(s) on prescribed precautions during self-care and functional transfers.              Learning Progress Summary           Patient Acceptance, E,D, VU,NR by JAGDEEP at 7/6/2020 1012    Comment:  reason for consult, evaluation results, goal setting, UE TE, transfer safety   Family Acceptance, E,D, VU,NR by JAGDEEP at 7/6/2020 1012    Comment:  reason for consult, evaluation results, goal setting, UE TE, transfer safety                   Point: Body mechanics (Not Started)     Description:   Instruct learner(s) on proper positioning and spine alignment during self-care, functional mobility activities and/or exercises.              Learner Progress:   Not documented in this visit.                      User Key     Initials Effective Dates Name Provider Type Discipline    JAGDEEP 06/08/18 -  Bella Campbell, OT Occupational Therapist OT                  OT Recommendation and Plan  Outcome Summary/Treatment Plan (OT)  Anticipated Discharge Disposition (OT): skilled nursing facility  Planned Therapy Interventions (OT Eval): activity tolerance training, BADL retraining, functional balance retraining, occupation/activity based interventions, patient/caregiver education/training, ROM/therapeutic exercise, strengthening exercise, transfer/mobility retraining  Therapy Frequency (OT Eval): daily  Plan of Care Review  Plan of Care Reviewed With: patient  Plan of Care Reviewed With: patient    Outcome Measures     Row Name 07/06/20 1012             How much help from another is currently needed...    Putting on and taking off regular lower body clothing?  2  -JAGDEEP      Bathing (including washing, rinsing, and drying)  2  -JAGDEEP      Toileting (which includes using toilet bed pan or urinal)  2  -JAGDEEP      Putting on and taking off regular upper body clothing  2  -JAGDEEP      Taking care of personal grooming (such as brushing teeth)  3  -JAGDEEP      Eating meals  3   -JAGDEEP      AM-PAC 6 Clicks Score (OT)  14  -JAGDEEP         Modified [place] Scale    Pre-Stroke Modified [place] Scale  0 - No Symptoms at all.  -JAGDEEP      Modified [place] Scale  4 - Moderately severe disability.  Unable to walk without assistance, and unable to attend to own bodily needs without assistance.  -JAGDEEP         Functional Assessment    Outcome Measure Options  AM-PAC 6 Clicks Daily Activity (OT);Modified [place]  -JAGDEEP        User Key  (r) = Recorded By, (t) = Taken By, (c) = Cosigned By    Initials Name Provider Type    Bella Chamorro OT Occupational Therapist          Time Calculation:   Time Calculation- OT     Row Name 07/06/20 1126 07/06/20 1012          Time Calculation- OT    OT Start Time  1012  -JAGDEEP  --     OT Received On  07/06/20  -JAGDEEP  --     OT Goal Re-Cert Due Date  07/16/20  -JAGDEEP  --        Timed Charges    36650 - OT Therapeutic Exercise Minutes  --  8  -JAGDEEP     18691 - OT Therapeutic Activity Minutes  --  5  -JAGDEEP     54906 - OT Self Care/Mgmt Minutes  --  5  -JAGDEEP       User Key  (r) = Recorded By, (t) = Taken By, (c) = Cosigned By    Initials Name Provider Type    Bella Chamorro OT Occupational Therapist        Therapy Charges for Today     Code Description Service Date Service Provider Modifiers Qty    [id number]  OT THER PROC EA 15 MIN 7/6/2020 Bella Campbell OT GO 1    [id number]  OT EVAL LOW COMPLEXITY 4 7/6/2020 Bella Campbell OT GO 1               Bella Campbell OT  7/6/2020    Electronically signed by Bella Campbell OT at 07/06/20 1321     Bella Campbell OT at 07/06/20 1012          Problem: Patient Care Overview  Goal: Plan of Care Review  Outcome: Ongoing (interventions implemented as appropriate)  Flowsheets (Taken 7/6/2020 1123)  Consent Given to Review Plan with: OT evaluation completed.  Pt. mod of 1-2 with all bed mobility and transfer up to BSC.  Pt. able to perform grooming SBA, but max A with higher level ADL task.  Decreased standing balance and limited core  strength.  Pt. appropriate for skilled OT services to promote return to PLOF.  Recommend SNF at discharge.  Plan of Care Reviewed With: patient  Patient Agreement with Plan of Care: agrees       Electronically signed by Bella Campbell OT at 07/06/20 9454

## 2020-07-07 NOTE — PLAN OF CARE
"Alert and oriented.  NIH \"1\" for sensory deficit.  Respirations unlabored, but SOA and audible wheeze with activity.  Monitor SR 1st degree block.  C/o LLE pain.  Redness BLE and moderate edema feet.  Dressings changed BLE.  PRN Norco twice and Flexeril once.  Up to recliner 2 assist and mechanical lift used to get back on specialty bed.  Boots in place.  Purewick in place.  Mass noted vaginal area, unsure cyst vs prolapse.  Daughter bedside for visit.    "

## 2020-07-07 NOTE — PROGRESS NOTES
[facility] at [facility]  Cardiology Progress Note      Chief Complaint/Reason for service:    · PE/DVT  · Demand ischemia  · Hypertension         Patient sitting in bed without complaints.  No events noted overnight.  She underwent CT angiogram of the chest which did show a nonobstructing thrombus within the right mid and lower lobe pulmonary arteries.  The RV-LV ratio was within normal limits.  Heparin drip was discontinued and the patient has been placed on anticoagulation with Eliquis.  No chest pain overnight.  She still remains hypertensive with a systolic in the 160s.  Records received from [facility] and were reviewed.    Past medical, surgical, social and family history reviewed in the patient's electronic medical record.         Vital Sign Min/Max for last 24 hours  Temp  Min: 97.3 °F (36.3 °C)  Max: 98.5 °F (36.9 °C)   BP  Min: 131/77  Max: 162/83   Pulse  Min: 70  Max: 79   Resp  Min: 16  Max: 18   SpO2  Min: 91 %  Max: 96 %   No data recorded    No intake or output data in the 24 hours ending 07/07/20 0833        Physical Exam   Constitutional: She is oriented to person, place, and time. She appears well-developed and well-nourished.   HENT:   Head: Normocephalic.   Neck: No JVD present. Carotid bruit is not present.   Cardiovascular: Normal rate, regular rhythm, normal heart sounds and intact distal pulses. Exam reveals no gallop and no friction rub.   No murmur heard.  Pulmonary/Chest: Effort normal and breath sounds normal.   Abdominal: Soft.   Musculoskeletal: She exhibits edema.   Left leg 2+ edema   Neurological: She is alert and oriented to person, place, and time.   Skin: Skin is warm and dry. No cyanosis. Nails show no clubbing.   Psychiatric: She has a normal mood and affect. Her behavior is normal.       Tele: Normal sinus rhythm    Results Review (reviewed the patient's recent labs in the electronic medical record):     EKG (7/4/2020): Sinus rhythm with  first-degree AV block    CT angiogram of the chest (7/6/2020): Nonobstructing thrombus within right mid and lower lobe pulmonary artery suggesting pulmonary emboli    ECHO (7/5/2020): LVEF 51-55%.  Moderate TR with RVSP 43 mmHg      Results from last 7 days   Lab Units 07/06/20  0903 07/05/20  0513 07/05/20  0055   SODIUM mmol/L 139 138 138   POTASSIUM mmol/L 3.5 3.2* 3.5   CHLORIDE mmol/L 102 101 99   BUN mg/dL 17 22 23   CREATININE mg/dL 0.96 1.10* 1.24*     Results from last 7 days   Lab Units 07/05/20  0513 07/05/20  0055   TROPONIN T ng/mL 0.411* 0.447*     Results from last 7 days   Lab Units 07/06/20  0903 07/05/20  0513 07/05/20  0055   WBC 10*3/mm3 9.84 12.87* 15.43*   HEMOGLOBIN g/dL 12.1 11.4* 12.3   HEMATOCRIT % 36.6 35.4 37.7   PLATELETS 10*3/mm3 153 138* 144       Lab Results   Component Value Date    HGBA1C 8.10 (H) 07/05/2020    HGBA1C 8.20 (H) 07/05/2020       Lab Results   Component Value Date    CHOL 312 (H) 07/05/2020    TRIG 176 (H) 07/05/2020    HDL 44 07/05/2020     (H) 07/05/2020              Active Hospital Problems    Diagnosis POA   • **Left-sided weakness [R53.1] Yes     Priority: High     · Transfer from [facility] for signs symptoms concerning of CVA on 7/4/2020  · CT of the head and neck (7/4/2020): Perfusion deficit in the left occipital region and some stenosis in the left PCA as well as vertebrals but no significant intracarotid stenosis  · MRI (7/5/2020): Unremarkable no acute intracranial disease     • Acute pulmonary embolism without acute cor pulmonale (CMS/HCC) [I26.99] Yes     Priority: High     · CT angiogram of the chest (7/6/2020):Nonobstructing thrombus within the right mid and lower lobe pulmonary arteries suggesting pulmonary emboli. The RV-LV ratio is within normal limits.     • History of CVA (cerebrovascular accident) [Z86.73] Not Applicable     Priority: High   • CKD (chronic kidney disease) stage 3, GFR 30-59 ml/min (CMS/[facility])  [N18.3] Yes     Priority: High   • Demand ischemia (CMS/[facility]) [I24.8] Yes     Priority: High   • Uncontrolled type 2 diabetes mellitus (CMS/[facility]) [E11.65] Yes     Priority: High   • Acute DVT (deep venous thrombosis) (CMS/[facility]) [I82.409] Yes     Priority: High     · Lower extremity venous duplex (7/5/2020):  Acute left extremity DVT in the common femoral, proximal femoral, mid femoral, distal femoral, popliteal, posterior tibial, peroneal and gastrocnemius     • Acute hypoxemic respiratory failure (CMS/[facility]) [J96.01] Yes     Priority: High   • Coronary artery disease involving native coronary artery of native heart without angina pectoris [I25.10] Yes     Priority: Medium     · Cardiac cath for NSTEMI at [facility] (1/9/2017): Multivessel CAD.  High risk PCI preferred over CABG due to multiple comorbidities.  PCI of LAD.  PTCA of third diagonal.  PCI of mid circumflex.  · Echo (7/5/2020): LVEF 51-55%.  Moderate TR.  RVSP 43 mmHg     • Chronic heart failure (CMS/[facility]) [I50.9] Yes     Priority: Medium     · Echo (7/5/2020): LVEF 51-55%.  Moderate TR.  RVSP 43 mmHg.     • Hyperlipidemia LDL goal <70 [E78.5] Yes     Priority: Low     · High intensity statin therapy indicated given presence of CAD and history of CVA     • Essential hypertension [I10] Yes     Priority: Low   • Acute UTI (urinary tract infection) [N39.0] Yes     Priority: Low              Acute DVT of left lower extremity/PE  · Anticoagulation is gold standard for treatment of DVTs. Reserve invasive therapies including mechanical thrombectomy and/or catheter infused TPA for DVT's that are refractory to anticoagulation therapy.  Please contact us if left leg edema and pain persist despite anticoagulation.  · IV heparin has been transitioned to Eliquis.  · CT angiogram of the chest shows nonobstructing thrombus in the right mid and lower lobe pulmonary arteries suggesting PE.  No evidence of RV strain          TIA versus CVA  · CT angio of the  head and neck does not indicate acute CVA  · MRI does not indicate acute CVA  · Currently on full strength aspirin and statin therapy  · Consider discontinuing aspirin since on anticoagulation with Eliquis     Demand ischemia  · Elevated troponins x2 are flat which is consistent with demand ischemia in the setting of TIA  · Patient reports progressive angina over the past 3 months requiring increased use of sublingual nitro   · Records received from [facility].  Patient underwent high risk PCI 2017.  · Patient prefers medical management for her CAD and does not want invasive procedures or cardiac caths  · No chest pain overnight.  Continue isosorbide     Heart failure with preserved EF  · Appears compensated  · Echo shows normal LVEF     Hypertension  · Blood pressures not well controlled currently systolic in the 160s  · On Coreg 12.5 mg twice daily  · Start home dose losartan     Hyperlipidemia  · Lipitor 80 mg daily  · LDL not at goal 233     Uncontrolled diabetes mellitus  · Hemoglobin A1c greater than 8.0  · Receiving subcu insulin  · Hospitalist managing on insulin     Leukocytosis  · History of recurrent UTI  · Receiving antibiotics and management per hospitalist     Stage III chronic kidney disease  · Stable with creatinine 0.96      Please call with any additional questions.  Patient needs to establish cardiology care which I have discussed with her.  Her daughter was not present this morning.  She has not been following a cardiologist since her high risk PCI.  She can follow-up with me in 4 weeks or establish care with a cardiology provider in her area.    Valentina Maria, APRN  7/7/2020

## 2020-07-07 NOTE — PROGRESS NOTES
"Stroke Progress Note       Chief Complaint:  ble weakness, slurred speech    Subjective     Subjective:  Resting in bed. No headache, unilateral weakness but \"feels tired all over\".  C/o low back pain which is chronic, requesting muscle relaxer.      Review of Systems   Constitutional: Negative for chills and fever.   HENT: Negative for trouble swallowing.    Eyes: Negative for visual disturbance.   Respiratory: Negative for shortness of breath.    Cardiovascular: Negative for chest pain.   Gastrointestinal: Negative for nausea.   Musculoskeletal: Positive for back pain.   Skin: Negative.    Neurological: Positive for facial asymmetry and weakness. Negative for dizziness, numbness and headaches.        Objective      Temp:  [97.3 °F (36.3 °C)-98.5 °F (36.9 °C)] 97.8 °F (36.6 °C)  Heart Rate:  [70-79] 78  Resp:  [16-18] 18  BP: (131-162)/(61-83) 162/83    Neurological Exam  Mental Status  Awake, alert and oriented to person, place and time. Speech is normal. Language is fluent with no aphasia.    Cranial Nerves  CN II: Visual fields full to confrontation.  CN III, IV, VI: Extraocular movements intact bilaterally.  CN V: Facial sensation is normal.  CN VII: Full and symmetric facial movement.  CN VIII: Hearing is normal.  CN IX, X: Palate elevates symmetrically  CN XI: Shoulder shrug strength is normal.  CN XII: Tongue midline without atrophy or fasciculations.    Motor  Decreased muscle bulk throughout. No fasciculations present. Normal muscle tone. Strength is 5/5 throughout all four extremities.    Sensory  Light touch is normal in upper and lower extremities.     Coordination  Right: Finger-to-nose normal. Heel-to-shin normal.  Left: Finger-to-nose normal. Heel-to-shin normal.    Gait   Not observed.      Physical Exam   Neurological: She has normal strength.   Psychiatric: Her speech is normal.       Results Review:    I reviewed the patient's new clinical results.    Lab Results (last 24 hours)     Procedure " Component Value Units Date/Time    POC Glucose Once [[id number]]  (Abnormal) Collected:  07/07/20 0805    Specimen:  Blood Updated:  07/07/20 0807     Glucose 256 mg/dL     POC Glucose Once [[id number]]  (Abnormal) Collected:  07/06/20 2000    Specimen:  Blood Updated:  07/06/20 2018     Glucose 294 mg/dL     POC Glucose Once [[id number]]  (Abnormal) Collected:  07/06/20 1601    Specimen:  Blood Updated:  07/06/20 1603     Glucose 236 mg/dL     POC Glucose Once [[id number]]  (Abnormal) Collected:  07/06/20 1108    Specimen:  Blood Updated:  07/06/20 1110     Glucose 323 mg/dL     Basic Metabolic Panel [[id number]]  (Abnormal) Collected:  07/06/20 0903    Specimen:  Blood Updated:  07/06/20 1046     Glucose 294 mg/dL      BUN 17 mg/dL      Creatinine 0.96 mg/dL      Sodium 139 mmol/L      Potassium 3.5 mmol/L      Comment: Specimen hemolyzed.  Results may be affected.        Chloride 102 mmol/L      CO2 25.0 mmol/L      Calcium 9.1 mg/dL      eGFR Non African Amer 56 mL/min/1.73      BUN/Creatinine Ratio 17.7     Anion Gap 12.0 mmol/L     Narrative:       GFR Normal >60  Chronic Kidney Disease <60  Kidney Failure <15      Heparin Anti-Xa [[id number]]  (Normal) Collected:  07/06/20 0903    Specimen:  Blood Updated:  07/06/20 1038     Heparin Anti-Xa (UFH) 0.57 IU/ml     Urine Culture - Urine, Urine, Clean Catch [[id number]]  (Normal) Collected:  07/05/20 0723    Specimen:  Urine, Clean Catch Updated:  07/06/20 1030     Urine Culture No growth    CBC (No Diff) [[id number]]  (Normal) Collected:  07/06/20 0903    Specimen:  Blood Updated:  07/06/20 1019     WBC 9.84 10*3/mm3      RBC 3.95 10*6/mm3      Hemoglobin 12.1 g/dL      Hematocrit 36.6 %      MCV 92.7 fL      MCH 30.6 pg      MCHC 33.1 g/dL      RDW 13.2 %      RDW-SD 44.4 fl      MPV 12.0 fL      Platelets 153 10*3/mm3         Ct Angiogram Chest With & Without Contrast    Result Date: 7/6/2020  Nonobstructing thrombus within the right mid and lower lobe pulmonary  arteries suggesting pulmonary emboli. Atelectatic changes seen in the lung bases with bronchial wall thickening seen in the left lower lobe bronchioles. The RV-LV ratio is within normal limits.  D:  07/06/2020 E:  07/06/2020        Mri Brain Without Contrast    Result Date: 7/5/2020  1. Small focus of encephalomalacia in the left occipital lobe.. No evidence of acute infarction here or elsewhere. 2. Chronic-appearing changes of the aging brain. No evidence of acute intracranial disease.  DICTATED:   07/05/2020 EDITED/ls :   07/05/2020  This report was finalized on 7/5/2020 10:48 PM by Dr. Matt Malave MD.      Results for orders placed during the hospital encounter of 07/04/20   Adult Transthoracic Echo Complete W/ Cont if Necessary Per Protocol    Narrative · Cardiac chamber sizes and wall thicknesses are normal. All wall   thicknesses are normal.  · Left ventricular systolic function appears to be at the lower limits of   normal to slightly depressed with an estimated ejection fraction of 51% -   55%.  · The trileaflet aortic valve has some degree of calcific changes.   Significant aortic stenosis/aortic insufficiency is not seen.  · The mitral valve is normal in structure and function.  · There is moderate tricuspid regurgitation with an estimated right   ventricular systolic pressure of 43 mmHg, sugesting moderately severe PA   hypertension.  · An agitated saline study reveals no evidence of intracardiac shunting.  · There are no other important findings on this study.            Assessment/Plan     Assessment/Plan:      79-year-old female with multiple vascular risk factors presented with unresponsiveness.  CT perfusion showed a left occipital perfusion deficit.  CTA head shows left PCA narrowing.  Patient is back to baseline now     1.  TIA versus stroke   -MRI brain revealed small focus of encephalomalacia in the left occipital lobe. No  evidence of acute infarction   - echo- EF 51-55%. Agitated saline study  reveals no evidence of intracardiac shunting.  -Decrease ASA to 81mg daily and  Lipitor 80 mg daily for secondary stroke prevention   -We will sign off. Fu wit PCP in 2-4 weeks s/p discharge.     2.  leukocytosis r/t UTI   -on Rocephin     3. Acute DVT of LLE  -cont Eliquis  -cardiology managing.      4. Demand ischemia  -cardiology following  -Elevated troponins x2 are flat which is consistent with demand ischemia in the setting of TIA  -Cont isosorbide 30 mg daily per cardiology      5. HTN  -normal BP goals---Maintain blood pressure less than 130/90     6.HLD  -  -lipitor 80mg daily for secondary stroke prevention      7. DM 2, uncontrolled   -Goal A1c close to 6.5.  Her A1c was 8.2.  Diabetes educator to see. Hospitalist managing meds, fu with pcp. May need to see endocrinology outpt.    8. PT/OT--waiting on SNF bed per case management.       Case discussed with nursing and Dr Mims.  We will sign off. Thank you for the consult.           Brooklyn Giron, KAIA  07/07/20  10:01

## 2020-07-07 NOTE — PROGRESS NOTES
Continued Stay Note  [facility]     Patient Name: Steffen Zeng  MRN: [id number]  Today's Date: 7/7/2020    Admit Date: 7/4/2020    Discharge Plan     Row Name 07/07/20 0917       Plan    Plan  [facility]    Patient/Family in Agreement with Plan  yes    Plan Comments  Spoke with patient at bedside. Plan is [facility] pending PT recs. Patient is pleasant and no discharge needs at this time. CM will continue to follow.    Final Discharge Disposition Code  03 - skilled nursing facility (SNF)        Discharge Codes    No documentation.             Kota Mayers RN

## 2020-07-07 NOTE — PLAN OF CARE
Problem: Patient Care Overview  Goal: Plan of Care Review  Outcome: Ongoing (interventions implemented as appropriate)  Flowsheets (Taken 7/7/2020 9908)  Outcome Summary: Patient presents with generalized weakness and decreased activity tolerance affecting independence with mobility. She transferred STS with Min Ax 2 and ambulated 5ft with RW and Mod A x2 + chair follow, demonstrating decreased sequencing and weight shifting ability. Distance limited by weakness and fatigue. Skilled IP PT warranted. Recommend SNF at discharge to promote PLOF.

## 2020-07-08 LAB
ANION GAP SERPL CALCULATED.3IONS-SCNC: 10 MMOL/L (ref 5–15)
BUN SERPL-MCNC: 19 MG/DL (ref 8–23)
BUN/CREAT SERPL: 18.6 (ref 7–25)
CALCIUM SPEC-SCNC: 9.6 MG/DL (ref 8.6–10.5)
CHLORIDE SERPL-SCNC: 101 MMOL/L (ref 98–107)
CO2 SERPL-SCNC: 26 MMOL/L (ref 22–29)
CREAT SERPL-MCNC: 1.02 MG/DL (ref 0.57–1)
DEPRECATED RDW RBC AUTO: 46 FL (ref 37–54)
ERYTHROCYTE [DISTWIDTH] IN BLOOD BY AUTOMATED COUNT: 13.6 % (ref 12.3–15.4)
GFR SERPL CREATININE-BSD FRML MDRD: 52 ML/MIN/1.73
GLUCOSE BLDC GLUCOMTR-MCNC: 273 MG/DL (ref 70–130)
GLUCOSE BLDC GLUCOMTR-MCNC: 278 MG/DL (ref 70–130)
GLUCOSE BLDC GLUCOMTR-MCNC: 308 MG/DL (ref 70–130)
GLUCOSE BLDC GLUCOMTR-MCNC: 312 MG/DL (ref 70–130)
GLUCOSE SERPL-MCNC: 259 MG/DL (ref 65–99)
HCT VFR BLD AUTO: 29.5 % (ref 34–46.6)
HGB BLD-MCNC: 9.5 G/DL (ref 12–15.9)
MCH RBC QN AUTO: 30.2 PG (ref 26.6–33)
MCHC RBC AUTO-ENTMCNC: 32.2 G/DL (ref 31.5–35.7)
MCV RBC AUTO: 93.7 FL (ref 79–97)
PLATELET # BLD AUTO: 155 10*3/MM3 (ref 140–450)
PMV BLD AUTO: 11.7 FL (ref 6–12)
POTASSIUM SERPL-SCNC: 3.5 MMOL/L (ref 3.5–5.2)
RBC # BLD AUTO: 3.15 10*6/MM3 (ref 3.77–5.28)
SODIUM SERPL-SCNC: 137 MMOL/L (ref 136–145)
WBC # BLD AUTO: 8.32 10*3/MM3 (ref 3.4–10.8)

## 2020-07-08 PROCEDURE — 63710000001 INSULIN LISPRO (HUMAN) PER 5 UNITS: Performed by: INTERNAL MEDICINE

## 2020-07-08 PROCEDURE — 85027 COMPLETE CBC AUTOMATED: CPT | Performed by: INTERNAL MEDICINE

## 2020-07-08 PROCEDURE — 63710000001 INSULIN DETEMIR PER 5 UNITS: Performed by: INTERNAL MEDICINE

## 2020-07-08 PROCEDURE — 80048 BASIC METABOLIC PNL TOTAL CA: CPT | Performed by: INTERNAL MEDICINE

## 2020-07-08 PROCEDURE — 99232 SBSQ HOSP IP/OBS MODERATE 35: CPT | Performed by: INTERNAL MEDICINE

## 2020-07-08 PROCEDURE — 82962 GLUCOSE BLOOD TEST: CPT

## 2020-07-08 RX ADMIN — INSULIN LISPRO 5 UNITS: 100 INJECTION, SOLUTION INTRAVENOUS; SUBCUTANEOUS at 12:22

## 2020-07-08 RX ADMIN — DULOXETINE HYDROCHLORIDE 60 MG: 60 CAPSULE, DELAYED RELEASE ORAL at 20:16

## 2020-07-08 RX ADMIN — INSULIN DETEMIR 10 UNITS: 100 INJECTION, SOLUTION SUBCUTANEOUS at 08:32

## 2020-07-08 RX ADMIN — SODIUM CHLORIDE, PRESERVATIVE FREE 10 ML: 5 INJECTION INTRAVENOUS at 08:38

## 2020-07-08 RX ADMIN — APIXABAN 5 MG: 5 TABLET, FILM COATED ORAL at 17:17

## 2020-07-08 RX ADMIN — SODIUM CHLORIDE, PRESERVATIVE FREE 10 ML: 5 INJECTION INTRAVENOUS at 22:05

## 2020-07-08 RX ADMIN — SODIUM CHLORIDE, PRESERVATIVE FREE 10 ML: 5 INJECTION INTRAVENOUS at 08:39

## 2020-07-08 RX ADMIN — HYDROCODONE BITARTRATE AND ACETAMINOPHEN 1 TABLET: 5; 325 TABLET ORAL at 20:16

## 2020-07-08 RX ADMIN — FLUTICASONE PROPIONATE 2 SPRAY: 50 SPRAY, METERED NASAL at 08:29

## 2020-07-08 RX ADMIN — AMITRIPTYLINE HYDROCHLORIDE 10 MG: 10 TABLET, FILM COATED ORAL at 21:18

## 2020-07-08 RX ADMIN — INSULIN LISPRO 4 UNITS: 100 INJECTION, SOLUTION INTRAVENOUS; SUBCUTANEOUS at 17:17

## 2020-07-08 RX ADMIN — ASPIRIN 81 MG: 81 TABLET, COATED ORAL at 08:30

## 2020-07-08 RX ADMIN — CARVEDILOL 12.5 MG: 12.5 TABLET, FILM COATED ORAL at 17:17

## 2020-07-08 RX ADMIN — INSULIN LISPRO 5 UNITS: 100 INJECTION, SOLUTION INTRAVENOUS; SUBCUTANEOUS at 08:33

## 2020-07-08 RX ADMIN — HYDROCODONE BITARTRATE AND ACETAMINOPHEN 1 TABLET: 5; 325 TABLET ORAL at 12:28

## 2020-07-08 RX ADMIN — LOSARTAN POTASSIUM 50 MG: 50 TABLET, FILM COATED ORAL at 08:30

## 2020-07-08 RX ADMIN — ISOSORBIDE MONONITRATE 30 MG: 30 TABLET, EXTENDED RELEASE ORAL at 08:30

## 2020-07-08 RX ADMIN — CYCLOBENZAPRINE HYDROCHLORIDE 10 MG: 10 TABLET, FILM COATED ORAL at 03:38

## 2020-07-08 RX ADMIN — DONEPEZIL HYDROCHLORIDE 10 MG: 10 TABLET, FILM COATED ORAL at 20:16

## 2020-07-08 RX ADMIN — FAMOTIDINE 40 MG: 20 TABLET, FILM COATED ORAL at 08:30

## 2020-07-08 RX ADMIN — CYCLOBENZAPRINE HYDROCHLORIDE 10 MG: 10 TABLET, FILM COATED ORAL at 12:28

## 2020-07-08 RX ADMIN — CYCLOBENZAPRINE HYDROCHLORIDE 10 MG: 10 TABLET, FILM COATED ORAL at 22:37

## 2020-07-08 RX ADMIN — APIXABAN 5 MG: 5 TABLET, FILM COATED ORAL at 05:40

## 2020-07-08 RX ADMIN — CASTOR OIL AND BALSAM, PERU: 788; 87 OINTMENT TOPICAL at 20:15

## 2020-07-08 RX ADMIN — ATORVASTATIN CALCIUM 80 MG: 40 TABLET, FILM COATED ORAL at 20:15

## 2020-07-08 RX ADMIN — PREGABALIN 150 MG: 75 CAPSULE ORAL at 08:30

## 2020-07-08 RX ADMIN — CASTOR OIL AND BALSAM, PERU: 788; 87 OINTMENT TOPICAL at 08:35

## 2020-07-08 RX ADMIN — LEVOTHYROXINE SODIUM 50 MCG: 50 TABLET ORAL at 05:40

## 2020-07-08 RX ADMIN — FERROUS SULFATE TAB 325 MG (65 MG ELEMENTAL FE) 325 MG: 325 (65 FE) TAB at 08:30

## 2020-07-08 RX ADMIN — INSULIN LISPRO 5 UNITS: 100 INJECTION, SOLUTION INTRAVENOUS; SUBCUTANEOUS at 17:18

## 2020-07-08 RX ADMIN — INSULIN LISPRO 4 UNITS: 100 INJECTION, SOLUTION INTRAVENOUS; SUBCUTANEOUS at 08:34

## 2020-07-08 RX ADMIN — CARVEDILOL 12.5 MG: 12.5 TABLET, FILM COATED ORAL at 08:30

## 2020-07-08 RX ADMIN — SODIUM CHLORIDE, PRESERVATIVE FREE 10 ML: 5 INJECTION INTRAVENOUS at 20:15

## 2020-07-08 RX ADMIN — INSULIN DETEMIR 10 UNITS: 100 INJECTION, SOLUTION SUBCUTANEOUS at 21:18

## 2020-07-08 RX ADMIN — PREGABALIN 150 MG: 75 CAPSULE ORAL at 20:15

## 2020-07-08 NOTE — PROGRESS NOTES
Continued Stay Note  [facility]     Patient Name: Steffen Zeng  MRN: [id number]  Today's Date: 7/8/2020    Admit Date: 7/4/2020    Discharge Plan     Row Name 07/08/20 1152       Plan    Plan  [facility]    Patient/Family in Agreement with Plan  yes    Plan Comments  Spoke with Karla at [facility] and she is going to look over the new notes and get back to me this afternoon. CM advised her that the patient is ready for transfer when they can accept. CM will continue to follow.    Final Discharge Disposition Code  03 - skilled nursing facility (SNF)        Discharge Codes    No documentation.             Kota Mayers RN

## 2020-07-08 NOTE — DISCHARGE PLACEMENT REQUEST
"Steffen Zeng (79 y.o. Female)   Carlos Mayers, RN Case Manager  [phone]    Date of Birth Social Security Number Address Home Phone MRN    1940  [address] [place] [address] [phone] [id number]    Nondenominational Marital Status          Episcopalian        Admission Date Admission Type Admitting Provider Attending Provider Department, Room/Bed    7/4/20 Urgent Berna Mims MD Opii, [place], MD [facility] 3F, S314/1    Discharge Date Discharge Disposition Discharge Destination                       Attending Provider:  Berna Mims MD    Allergies:  No Known Allergies    Isolation:  None   Infection:  None   Code Status:  No CPR    Ht:  160 cm (63\")   Wt:  110 kg (242 lb)    Admission Cmt:  None   Principal Problem:  Left-sided weakness [R53.1] More...                 Active Insurance as of 7/4/2020     Primary Coverage     Payor Plan Insurance Group Employer/Plan Group    MEDICARE MEDICARE A & B      Payor Plan Address Payor Plan Phone Number Payor Plan Fax Number Effective Dates    [address] [phone]  10/1/2004 - None Entered    [facility] [id number]       Subscriber Name Subscriber Birth Date Member ID       STEFFEN ZENG 1940 [id number]           Secondary Coverage     Payor Plan Insurance Group Employer/Plan Group    [facility] SUPP [id number]     Payor Plan Address Payor Plan Phone Number Payor Plan Fax Number Effective Dates    [address]   12/1/2016 - None Entered    [facility] [id number]       Subscriber Name Subscriber Birth Date Member ID       STEFFEN ZENG 1940 [id number]                 Emergency Contacts      (Rel.) Home Phone Work Phone Mobile Phone    Cher Velázquez (Daughter) [phone] -- --    Pyaal Luu (Sister) [phone] -- --            Vital Signs (last day)     Date/Time   Temp   Temp src   Pulse   Resp   BP   Patient Position   SpO2    07/08/20 0320   98.2 (36.8)   --   81   16   143/66   Lying   " 92    07/07/20 1835   98 (36.7)   Oral   74   18   108/60   Lying   93    07/07/20 1800   --   --   80   --   --   --   95    07/07/20 1654   --   --   77   --   --   --   94    07/07/20 1518   97.7 (36.5)   Oral   75   16   104/62   Sitting   96    07/07/20 1400   --   --   76   --   140/62   --   95    07/07/20 1300   --   --   87   --   --   --   --    07/07/20 1200   --   --   72   --   --   --   91    07/07/20 1137   97.8 (36.6)   Oral   77   18   134/64   Sitting   --    07/07/20 1000   --   --   93   --   --   --   --    07/07/20 0805   97.8 (36.6)   Oral   78   18   162/83   Lying   94    07/07/20 0800   --   --   81   --   --   --   93    07/07/20 0451   98.3 (36.8)   Oral   79   18   131/77   Lying   91              Current Facility-Administered Medications   Medication Dose Route Frequency Provider Last Rate Last Dose   • acetaminophen (TYLENOL) tablet 650 mg  650 mg Oral Q4H PRN Radha Reed MD   650 mg at 07/06/20 1019    Or   • acetaminophen (TYLENOL) 160 MG/5ML solution 650 mg  650 mg Oral Q4H PRN Radha Reed MD        Or   • acetaminophen (TYLENOL) suppository 650 mg  650 mg Rectal Q4H PRN Radha Reed MD       • acetaminophen (TYLENOL) suppository 650 mg  650 mg Rectal Q4H PRN Radha Reed MD   650 mg at 07/05/20 0058   • amitriptyline (ELAVIL) tablet 10 mg  10 mg Oral Nightly Anna Castillo DO   10 mg at 07/07/20 2236   • apixaban (ELIQUIS) tablet 5 mg  5 mg Oral Q12H Nahid Sanz MD   5 mg at 07/08/20 0540   • aspirin EC tablet 81 mg  81 mg Oral Daily Brooklyn Giron APRN       • atorvastatin (LIPITOR) tablet 80 mg  80 mg Oral Nightly Blanca Tom APRN   80 mg at 07/07/20 2236   • carvedilol (COREG) tablet 12.5 mg  12.5 mg Oral BID With Meals Radha Reed MD   12.5 mg at 07/07/20 1654   • castor oil-balsam peru (VENELEX) ointment   Topical Q12H Anna Castillo, DO       • cyclobenzaprine (FLEXERIL) tablet 10 mg  10 mg Oral TID PRN Anna Castillo,  DO   10 mg at 07/08/20 0338   • dextrose (D50W) 25 g/ 50mL Intravenous Solution 25 g  25 g Intravenous Q15 Min PRN Radha Reed MD       • dextrose (GLUTOSE) oral gel 15 g  15 g Oral Q15 Min PRN Radha Reed MD       • docusate sodium (COLACE) capsule 100 mg  100 mg Oral BID PRN Radha Reed MD       • donepezil (ARICEPT) tablet 10 mg  10 mg Oral Nightly Berna Mims MD   10 mg at 07/07/20 2236   • DULoxetine (CYMBALTA) DR capsule 60 mg  60 mg Oral Nightly Anna Castillo,    60 mg at 07/07/20 2237   • famotidine (PEPCID) tablet 40 mg  40 mg Oral Daily Radha Reed MD   40 mg at 07/07/20 0916   • ferrous sulfate tablet 325 mg  325 mg Oral Daily With Breakfast Radha Reed MD   325 mg at 07/07/20 0916   • fluticasone (FLONASE) 50 MCG/ACT nasal spray 2 spray  2 spray Each Nare Daily Radha Reed MD   2 spray at 07/07/20 0917   • glucagon (human recombinant) (GLUCAGEN DIAGNOSTIC) injection 1 mg  1 mg Subcutaneous Q15 Min PRN Radha Reed MD       • HYDROcodone-acetaminophen (NORCO) 5-325 MG per tablet 1 tablet  1 tablet Oral Q6H PRN Shelli Vega APRN   1 tablet at 07/07/20 1654   • insulin detemir (LEVEMIR) injection 10 Units  10 Units Subcutaneous Q12H Berna Mims MD   10 Units at 07/07/20 2238   • insulin lispro (humaLOG) injection 0-7 Units  0-7 Units Subcutaneous TID AC Radha Reed MD   4 Units at 07/07/20 1745   • ipratropium-albuterol (DUO-NEB) nebulizer solution 3 mL  3 mL Nebulization Q4H PRN Radha Reed MD       • isosorbide mononitrate (IMDUR) 24 hr tablet 30 mg  30 mg Oral Q24H Marie Maria APRN   30 mg at 07/07/20 0917   • levothyroxine (SYNTHROID, LEVOTHROID) tablet 50 mcg  50 mcg Oral Daily Radha Reed MD   50 mcg at 07/08/20 0540   • LORazepam (ATIVAN) tablet 1 mg  1 mg Oral Nightly PRN Anna Castillo DO   1 mg at 07/07/20 2236   • losartan (COZAAR) tablet 50 mg  50 mg Oral Q24H Marie Maria APRN   50 mg at 07/07/20 0916   •  "morphine injection 2 mg  2 mg Intravenous Q4H PRN Anna Castillo,    2 mg at 07/07/20 2332   • ondansetron (ZOFRAN) tablet 4 mg  4 mg Oral Q6H PRN Radha Reed MD        Or   • ondansetron (ZOFRAN) injection 4 mg  4 mg Intravenous Q6H PRN Radha Reed MD   4 mg at 07/06/20 1310   • pregabalin (LYRICA) capsule 150 mg  150 mg Oral BID Berna Mims MD   150 mg at 07/07/20 2235   • sodium chloride 0.9 % flush 10 mL  10 mL Intravenous Q12H Blanca Tom APRN   10 mL at 07/07/20 2237   • sodium chloride 0.9 % flush 10 mL  10 mL Intravenous PRN Blanca Tom APRN       • sodium chloride 0.9 % flush 10 mL  10 mL Intravenous Q12H Radha Reed MD   10 mL at 07/07/20 2237   • sodium chloride 0.9 % flush 10 mL  10 mL Intravenous PRN Radha Reed MD       • trolamine salicylate (ASPERCREME) 10 % cream   Topical 4x Daily PRN Shelli Vega APRN            Physician Progress Notes (last 24 hours) (Notes from 07/07/20 0733 through 07/08/20 0733)      Brooklyn Giron APRN at 07/07/20 1001          Stroke Progress Note       Chief Complaint:  ble weakness, slurred speech    Subjective     Subjective:  Resting in bed. No headache, unilateral weakness but \"feels tired all over\".  C/o low back pain which is chronic, requesting muscle relaxer.      Review of Systems   Constitutional: Negative for chills and fever.   HENT: Negative for trouble swallowing.    Eyes: Negative for visual disturbance.   Respiratory: Negative for shortness of breath.    Cardiovascular: Negative for chest pain.   Gastrointestinal: Negative for nausea.   Musculoskeletal: Positive for back pain.   Skin: Negative.    Neurological: Positive for facial asymmetry and weakness. Negative for dizziness, numbness and headaches.        Objective      Temp:  [97.3 °F (36.3 °C)-98.5 °F (36.9 °C)] 97.8 °F (36.6 °C)  Heart Rate:  [70-79] 78  Resp:  [16-18] 18  BP: (131-162)/(61-83) 162/83    Neurological Exam  Mental Status  Awake, " alert and oriented to person, place and time. Speech is normal. Language is fluent with no aphasia.    Cranial Nerves  CN II: Visual fields full to confrontation.  CN III, IV, VI: Extraocular movements intact bilaterally.  CN V: Facial sensation is normal.  CN VII: Full and symmetric facial movement.  CN VIII: Hearing is normal.  CN IX, X: Palate elevates symmetrically  CN XI: Shoulder shrug strength is normal.  CN XII: Tongue midline without atrophy or fasciculations.    Motor  Decreased muscle bulk throughout. No fasciculations present. Normal muscle tone. Strength is 5/5 throughout all four extremities.    Sensory  Light touch is normal in upper and lower extremities.     Coordination  Right: Finger-to-nose normal. Heel-to-shin normal.  Left: Finger-to-nose normal. Heel-to-shin normal.    Gait   Not observed.      Physical Exam   Neurological: She has normal strength.   Psychiatric: Her speech is normal.       Results Review:    I reviewed the patient's new clinical results.    Lab Results (last 24 hours)     Procedure Component Value Units Date/Time    POC Glucose Once [[id number]]  (Abnormal) Collected:  07/07/20 0805    Specimen:  Blood Updated:  07/07/20 0807     Glucose 256 mg/dL     POC Glucose Once [[id number]]  (Abnormal) Collected:  07/06/20 2000    Specimen:  Blood Updated:  07/06/20 2018     Glucose 294 mg/dL     POC Glucose Once [[id number]]  (Abnormal) Collected:  07/06/20 1601    Specimen:  Blood Updated:  07/06/20 1603     Glucose 236 mg/dL     POC Glucose Once [[id number]]  (Abnormal) Collected:  07/06/20 1108    Specimen:  Blood Updated:  07/06/20 1110     Glucose 323 mg/dL     Basic Metabolic Panel [[id number]]  (Abnormal) Collected:  07/06/20 0903    Specimen:  Blood Updated:  07/06/20 1046     Glucose 294 mg/dL      BUN 17 mg/dL      Creatinine 0.96 mg/dL      Sodium 139 mmol/L      Potassium 3.5 mmol/L      Comment: Specimen hemolyzed.  Results may be affected.        Chloride 102 mmol/L      CO2  25.0 mmol/L      Calcium 9.1 mg/dL      eGFR Non African Amer 56 mL/min/1.73      BUN/Creatinine Ratio 17.7     Anion Gap 12.0 mmol/L     Narrative:       GFR Normal >60  Chronic Kidney Disease <60  Kidney Failure <15      Heparin Anti-Xa [[id number]]  (Normal) Collected:  07/06/20 0903    Specimen:  Blood Updated:  07/06/20 1038     Heparin Anti-Xa (UFH) 0.57 IU/ml     Urine Culture - Urine, Urine, Clean Catch [[id number]]  (Normal) Collected:  07/05/20 0723    Specimen:  Urine, Clean Catch Updated:  07/06/20 1030     Urine Culture No growth    CBC (No Diff) [[id number]]  (Normal) Collected:  07/06/20 0903    Specimen:  Blood Updated:  07/06/20 1019     WBC 9.84 10*3/mm3      RBC 3.95 10*6/mm3      Hemoglobin 12.1 g/dL      Hematocrit 36.6 %      MCV 92.7 fL      MCH 30.6 pg      MCHC 33.1 g/dL      RDW 13.2 %      RDW-SD 44.4 fl      MPV 12.0 fL      Platelets 153 10*3/mm3         Ct Angiogram Chest With & Without Contrast    Result Date: 7/6/2020  Nonobstructing thrombus within the right mid and lower lobe pulmonary arteries suggesting pulmonary emboli. Atelectatic changes seen in the lung bases with bronchial wall thickening seen in the left lower lobe bronchioles. The RV-LV ratio is within normal limits.  D:  07/06/2020 E:  07/06/2020        Mri Brain Without Contrast    Result Date: 7/5/2020  1. Small focus of encephalomalacia in the left occipital lobe.. No evidence of acute infarction here or elsewhere. 2. Chronic-appearing changes of the aging brain. No evidence of acute intracranial disease.  DICTATED:   07/05/2020 EDITED/ls :   07/05/2020  This report was finalized on 7/5/2020 10:48 PM by Dr. Matt Malave MD.      Results for orders placed during the hospital encounter of 07/04/20   Adult Transthoracic Echo Complete W/ Cont if Necessary Per Protocol    Narrative · Cardiac chamber sizes and wall thicknesses are normal. All wall   thicknesses are normal.  · Left ventricular systolic function appears to be at the  lower limits of   normal to slightly depressed with an estimated ejection fraction of 51% -   55%.  · The trileaflet aortic valve has some degree of calcific changes.   Significant aortic stenosis/aortic insufficiency is not seen.  · The mitral valve is normal in structure and function.  · There is moderate tricuspid regurgitation with an estimated right   ventricular systolic pressure of 43 mmHg, sugesting moderately severe PA   hypertension.  · An agitated saline study reveals no evidence of intracardiac shunting.  · There are no other important findings on this study.            Assessment/Plan     Assessment/Plan:      79-year-old female with multiple vascular risk factors presented with unresponsiveness.  CT perfusion showed a left occipital perfusion deficit.  CTA head shows left PCA narrowing.  Patient is back to baseline now     1.  TIA versus stroke   -MRI brain revealed small focus of encephalomalacia in the left occipital lobe. No  evidence of acute infarction   - echo- EF 51-55%. Agitated saline study reveals no evidence of intracardiac shunting.  -Decrease ASA to 81mg daily and  Lipitor 80 mg daily for secondary stroke prevention   -We will sign off. Fu wit PCP in 2-4 weeks s/p discharge.     2.  leukocytosis r/t UTI   -on Rocephin     3. Acute DVT of LLE  -cont Eliquis  -cardiology managing.      4. Demand ischemia  -cardiology following  -Elevated troponins x2 are flat which is consistent with demand ischemia in the setting of TIA  -Cont isosorbide 30 mg daily per cardiology      5. HTN  -normal BP goals---Maintain blood pressure less than 130/90     6.HLD  -  -lipitor 80mg daily for secondary stroke prevention      7. DM 2, uncontrolled   -Goal A1c close to 6.5.  Her A1c was 8.2.  Diabetes educator to see. Hospitalist managing meds, fu with pcp. May need to see endocrinology outpt.    8. PT/OT--waiting on SNF bed per case management.       Case discussed with nursing and Dr Mims.  We will sign  off. Thank you for the consult.           KAIA Flores  07/07/20  10:01        Electronically signed by Brooklyn Giron APRN at 07/07/20 1012     Marie Maria APRN at 07/07/20 0829          [facility]  Cardiology Progress Note      Chief Complaint/Reason for service:    · PE/DVT  · Demand ischemia  · Hypertension    Subjective     Patient sitting in bed without complaints.  No events noted overnight.  She underwent CT angiogram of the chest which did show a nonobstructing thrombus within the right mid and lower lobe pulmonary arteries.  The RV-LV ratio was within normal limits.  Heparin drip was discontinued and the patient has been placed on anticoagulation with Eliquis.  No chest pain overnight.  She still remains hypertensive with a systolic in the 160s.  Records received [facility] and were reviewed.    Past medical, surgical, social and family history reviewed in the patient's electronic medical record.      Objective   Vital Sign Min/Max for last 24 hours  Temp  Min: 97.3 °F (36.3 °C)  Max: 98.5 °F (36.9 °C)   BP  Min: 131/77  Max: 162/83   Pulse  Min: 70  Max: 79   Resp  Min: 16  Max: 18   SpO2  Min: 91 %  Max: 96 %   No data recorded    No intake or output data in the 24 hours ending 07/07/20 0833        Physical Exam   Constitutional: She is oriented to person, place, and time. She appears well-developed and well-nourished.   HENT:   Head: Normocephalic.   Neck: No JVD present. Carotid bruit is not present.   Cardiovascular: Normal rate, regular rhythm, normal heart sounds and intact distal pulses. Exam reveals no gallop and no friction rub.   No murmur heard.  Pulmonary/Chest: Effort normal and breath sounds normal.   Abdominal: Soft.   Musculoskeletal: She exhibits edema.   Left leg 2+ edema   Neurological: She is alert and oriented to person, place, and time.   Skin: Skin is warm and dry. No cyanosis. Nails show no clubbing.    Psychiatric: She has a normal mood and affect. Her behavior is normal.       Tele: Normal sinus rhythm    Results Review (reviewed the patient's recent labs in the electronic medical record):     EKG (7/4/2020): Sinus rhythm with first-degree AV block    CT angiogram of the chest (7/6/2020): Nonobstructing thrombus within right mid and lower lobe pulmonary artery suggesting pulmonary emboli    ECHO (7/5/2020): LVEF 51-55%.  Moderate TR with RVSP 43 mmHg      Results from last 7 days   Lab Units 07/06/20  0903 07/05/20  0513 07/05/20  0055   SODIUM mmol/L 139 138 138   POTASSIUM mmol/L 3.5 3.2* 3.5   CHLORIDE mmol/L 102 101 99   BUN mg/dL 17 22 23   CREATININE mg/dL 0.96 1.10* 1.24*     Results from last 7 days   Lab Units 07/05/20  0513 07/05/20  0055   TROPONIN T ng/mL 0.411* 0.447*     Results from last 7 days   Lab Units 07/06/20  0903 07/05/20  0513 07/05/20  0055   WBC 10*3/mm3 9.84 12.87* 15.43*   HEMOGLOBIN g/dL 12.1 11.4* 12.3   HEMATOCRIT % 36.6 35.4 37.7   PLATELETS 10*3/mm3 153 138* 144       Lab Results   Component Value Date    HGBA1C 8.10 (H) 07/05/2020    HGBA1C 8.20 (H) 07/05/2020       Lab Results   Component Value Date    CHOL 312 (H) 07/05/2020    TRIG 176 (H) 07/05/2020    HDL 44 07/05/2020     (H) 07/05/2020         Assessment     Active Hospital Problems    Diagnosis POA   • **Left-sided weakness [R53.1] Yes     Priority: High     · Transfer from [facility] for signs symptoms concerning of CVA on 7/4/2020  · CT of the head and neck (7/4/2020): Perfusion deficit in the left occipital region and some stenosis in the left PCA as well as vertebrals but no significant intracarotid stenosis  · MRI (7/5/2020): Unremarkable no acute intracranial disease     • Acute pulmonary embolism without acute cor pulmonale (CMS/HCC) [I26.99] Yes     Priority: High     · CT angiogram of the chest (7/6/2020):Nonobstructing thrombus within the right mid and lower lobe  pulmonary arteries suggesting pulmonary emboli. The RV-LV ratio is within normal limits.     • History of CVA (cerebrovascular accident) [Z86.73] Not Applicable     Priority: High   • CKD (chronic kidney disease) stage 3, GFR 30-59 ml/min (CMS/[facility]) [N18.3] Yes     Priority: High   • Demand ischemia (CMS/[facility]) [I24.8] Yes     Priority: High   • Uncontrolled type 2 diabetes mellitus (CMS/[facility]) [E11.65] Yes     Priority: High   • Acute DVT (deep venous thrombosis) (CMS/[facility]) [I82.409] Yes     Priority: High     · Lower extremity venous duplex (7/5/2020):  Acute left extremity DVT in the common femoral, proximal femoral, mid femoral, distal femoral, popliteal, posterior tibial, peroneal and gastrocnemius     • Acute hypoxemic respiratory failure (CMS/[facility]) [J96.01] Yes     Priority: High   • Coronary artery disease involving native coronary artery of native heart without angina pectoris [I25.10] Yes     Priority: Medium     · Cardiac cath for NSTEMI at [facility] (1/9/2017): Multivessel CAD.  High risk PCI preferred over CABG due to multiple comorbidities.  PCI of LAD.  PTCA of third diagonal.  PCI of mid circumflex.  · Echo (7/5/2020): LVEF 51-55%.  Moderate TR.  RVSP 43 mmHg     • Chronic heart failure (CMS/[facility]) [I50.9] Yes     Priority: Medium     · Echo (7/5/2020): LVEF 51-55%.  Moderate TR.  RVSP 43 mmHg.     • Hyperlipidemia LDL goal <70 [E78.5] Yes     Priority: Low     · High intensity statin therapy indicated given presence of CAD and history of CVA     • Essential hypertension [I10] Yes     Priority: Low   • Acute UTI (urinary tract infection) [N39.0] Yes     Priority: Low         Plan     Acute DVT of left lower extremity/PE  · Anticoagulation is gold standard for treatment of DVTs. Reserve invasive therapies including mechanical thrombectomy and/or catheter infused TPA for DVT's that are refractory to anticoagulation therapy.  Please contact us if left leg edema and pain persist despite  anticoagulation.  · IV heparin has been transitioned to Eliquis.  · CT angiogram of the chest shows nonobstructing thrombus in the right mid and lower lobe pulmonary arteries suggesting PE.  No evidence of RV strain          TIA versus CVA  · CT angio of the head and neck does not indicate acute CVA  · MRI does not indicate acute CVA  · Currently on full strength aspirin and statin therapy  · Consider discontinuing aspirin since on anticoagulation with Eliquis     Demand ischemia  · Elevated troponins x2 are flat which is consistent with demand ischemia in the setting of TIA  · Patient reports progressive angina over the past 3 months requiring increased use of sublingual nitro   · Records received from [facility].  Patient underwent high risk PCI 2017.  · Patient prefers medical management for her CAD and does not want invasive procedures or cardiac caths  · No chest pain overnight.  Continue isosorbide     Heart failure with preserved EF  · Appears compensated  · Echo shows normal LVEF     Hypertension  · Blood pressures not well controlled currently systolic in the 160s  · On Coreg 12.5 mg twice daily  · Start home dose losartan     Hyperlipidemia  · Lipitor 80 mg daily  · LDL not at goal 233     Uncontrolled diabetes mellitus  · Hemoglobin A1c greater than 8.0  · Receiving subcu insulin  · Hospitalist managing on insulin     Leukocytosis  · History of recurrent UTI  · Receiving antibiotics and management per hospitalist     Stage III chronic kidney disease  · Stable with creatinine 0.96      Please call with any additional questions.  Patient needs to establish cardiology care which I have discussed with her.  Her daughter was not present this morning.  She has not been following a cardiologist since her high risk PCI.  She can follow-up with me in 4 weeks or establish care with a cardiology provider in her area.    KAIA Valdes  7/7/2020    Electronically signed by Marie Maria APRN  at 20 0839     Berna Mims MD at 20 0824              [facility] Medicine Services  PROGRESS NOTE    Patient Name: Steffen Zeng  : 1940  MRN: [id number]    Date of Admission: 2020  Primary Care Physician: Provider, No Known    Subjective   Subjective     CC: Follow-up with LLE DVT    HPI: No acute events overnight, patient said that she slept well, said that her feet feel much better.  She does endorse very mild pain.    Review of Systems  Gen- No fevers, chills  CV- No chest pain, palpitations  Resp- No cough, dyspnea  GI- No N/V/D, abd pain    All systems reviewed currently negative except as mentioned in HPI above    Objective   Objective     Vital Signs:   Temp:  [97.3 °F (36.3 °C)-98.5 °F (36.9 °C)] 97.8 °F (36.6 °C)  Heart Rate:  [70-79] 78  Resp:  [16-18] 18  BP: (131-162)/(61-83) 162/83  Total (NIH Stroke Scale): 1     Physical Exam:  Constitutional: Elderly lady, in no acute distress, awake, alert  HENT: NCAT, mucous membranes moist  Respiratory: Clear to auscultation bilaterally, respiratory effort normal   Cardiovascular: RRR, no murmurs, rubs, or gallops, palpable pedal pulses bilaterally  Gastrointestinal: Positive bowel sounds, soft, nontender, nondistended  Musculoskeletal: No bilateral ankle edema right lower extremity, left lateral ankle wounds, under dressing  Psychiatric: Appropriate affect, cooperative  Neurologic: Oriented x 3, no focal deficits, tremors  Skin: No rashes    Results Reviewed:  Results from last 7 days   Lab Units 20  0903 20  1045 20  0513 20  0055   WBC 10*3/mm3 9.84  --  12.87* 15.43*   HEMOGLOBIN g/dL 12.1  --  11.4* 12.3   HEMATOCRIT % 36.6  --  35.4 37.7   PLATELETS 10*3/mm3 153  --  138* 144   INR   --  1.12  --  1.16   PROCALCITONIN ng/mL  --   --   --  7.52*     Results from last 7 days   Lab Units 20  0903 20  0513 20  0055   SODIUM mmol/L 139 138 138   POTASSIUM mmol/L 3.5 3.2*  3.5   CHLORIDE mmol/L 102 101 99   CO2 mmol/L 25.0 23.0 25.0   BUN mg/dL 17 22 23   CREATININE mg/dL 0.96 1.10* 1.24*   GLUCOSE mg/dL 294* 284* 334*   CALCIUM mg/dL 9.1 8.8 8.8   ALT (SGPT) U/L  --  35* 37*   AST (SGOT) U/L  --  60* 61*   TROPONIN T ng/mL  --  0.411* 0.447*     Estimated Creatinine Clearance: 55.6 mL/min (by C-G formula based on SCr of 0.96 mg/dL).    Microbiology Results Abnormal     Procedure Component Value - Date/Time    Urine Culture - Urine, Urine, Clean Catch [[id number]]  (Normal) Collected:  07/05/20 0723    Lab Status:  Final result Specimen:  Urine, Clean Catch Updated:  07/06/20 1030     Urine Culture No growth    COVID PRE-OP / PRE-PROCEDURE SCREENING ORDER (NO ISOLATION) - Swab, Nasopharynx [[id number]] Collected:  07/05/20 0137    Lab Status:  Final result Specimen:  Swab from Nasopharynx Updated:  07/05/20 0249    Narrative:       The following orders were created for panel order COVID PRE-OP / PRE-PROCEDURE SCREENING ORDER (NO ISOLATION) - Swab, Nasopharynx.  Procedure                               Abnormality         Status                     ---------                               -----------         ------                     COVID-19,CEPHEID,MYA IN-...[[id number]]  Normal              Final result                 Please view results for these tests on the individual orders.    COVID-19,CEPHEID,MYA IN-HOUSE(OR EMERGENT/ADD-ON),NP SWAB IN TRANSPORT MEDIA 3-4 HR TAT - Swab, Nasopharynx [[id number]]  (Normal) Collected:  07/05/20 0137    Lab Status:  Final result Specimen:  Swab from Nasopharynx Updated:  07/05/20 0249     COVID19 Not Detected    Narrative:       Fact sheet for providers: https://www.fda.gov/media/808808/download     Fact sheet for patients: https://www.fda.gov/media/893364/download          Imaging Results (Last 24 Hours)     Procedure Component Value Units Date/Time    CT Angiogram Chest With & Without Contrast [[id number]] Collected:  07/06/20 1451     Updated:   07/06/20 1843    Narrative:       EXAMINATION: CT ANGIOGRAM CHEST W WO CONTRAST-07/06/2020:      INDICATION: DVT; Z74.09-Other reduced mobility; Z78.9-Other specified  health status, chest pain, hypoxia.     TECHNIQUE: Multiple axial CT imaging was obtained of the chest with and  without the administration of intravenous contrast.     The radiation dose reduction device was turned on for each scan per the  ALARA (As Low as Reasonably Achievable) protocol.     COMPARISON: NONE.     FINDINGS: The thyroid is enlarged and heterogeneous in appearance. Small  lymph nodes seen scattered throughout the mediastinum. Filling defects  seen in the right mid and lower lobe pulmonary arteries suggesting  thrombus and pulmonary emboli. The pulmonary emboli are incompletely  obstructing. RV-LV ratio is within normal limits. There are  bronchiectatic changes and wall thickening identified of the bronchioles  in the left lower lobe. There are mild increased markings suggesting  atelectatic change in the lung bases bilaterally. No dense  consolidation. No definite pleural effusion. Degenerative changes seen  within the spine. The visualized upper abdomen is unremarkable.       Impression:       Nonobstructing thrombus within the right mid and lower lobe  pulmonary arteries suggesting pulmonary emboli. Atelectatic changes seen  in the lung bases with bronchial wall thickening seen in the left lower  lobe bronchioles. The RV-LV ratio is within normal limits.     D:  07/06/2020  E:  07/06/2020                   Results for orders placed during the hospital encounter of 07/04/20   Adult Transthoracic Echo Complete W/ Cont if Necessary Per Protocol    Narrative · Cardiac chamber sizes and wall thicknesses are normal. All wall   thicknesses are normal.  · Left ventricular systolic function appears to be at the lower limits of   normal to slightly depressed with an estimated ejection fraction of 51% -   55%.  · The trileaflet aortic valve  has some degree of calcific changes.   Significant aortic stenosis/aortic insufficiency is not seen.  · The mitral valve is normal in structure and function.  · There is moderate tricuspid regurgitation with an estimated right   ventricular systolic pressure of 43 mmHg, sugesting moderately severe PA   hypertension.  · An agitated saline study reveals no evidence of intracardiac shunting.  · There are no other important findings on this study.          I have reviewed the medications:  Scheduled Meds:    amitriptyline 10 mg Oral Nightly   apixaban 5 mg Oral Q12H   aspirin 325 mg Oral Daily   Or      aspirin 300 mg Rectal Daily   atorvastatin 80 mg Oral Nightly   carvedilol 12.5 mg Oral BID With Meals   castor oil-balsam peru  Topical Q12H   cefTRIAXone 1 g Intravenous Q24H   DULoxetine 60 mg Oral Nightly   famotidine 40 mg Oral Daily   ferrous sulfate 325 mg Oral Daily With Breakfast   fluticasone 2 spray Each Nare Daily   insulin detemir 10 Units Subcutaneous Q12H   insulin lispro 0-7 Units Subcutaneous TID AC   isosorbide mononitrate 30 mg Oral Q24H   levothyroxine 50 mcg Oral Daily   losartan 50 mg Oral Q24H   sodium chloride 10 mL Intravenous Q12H   sodium chloride 10 mL Intravenous Q12H     Continuous Infusions:   PRN Meds:.•  acetaminophen **OR** acetaminophen **OR** acetaminophen  •  acetaminophen  •  cyclobenzaprine  •  dextrose  •  dextrose  •  docusate sodium  •  glucagon (human recombinant)  •  HYDROcodone-acetaminophen  •  ipratropium-albuterol  •  LORazepam  •  Morphine  •  ondansetron **OR** ondansetron  •  sodium chloride  •  sodium chloride  •  trolamine salicylate    Assessment/Plan   Assessment & Plan     Active Hospital Problems    Diagnosis  POA   • **Left-sided weakness [R53.1]  Yes   • Acute pulmonary embolism without acute cor pulmonale (CMS/HCC) [I26.99]  Yes   • History of CVA (cerebrovascular accident) [Z86.73]  Not Applicable   • Hyperlipidemia LDL goal <70 [E78.5]  Yes   • CKD (chronic  kidney disease) stage 3, GFR 30-59 ml/min (CMS/[facility]) [N18.3]  Yes   • Demand ischemia (CMS/[facility]) [I24.8]  Yes   • Essential hypertension [I10]  Yes   • Coronary artery disease involving native coronary artery of native heart without angina pectoris [I25.10]  Yes   • Chronic heart failure (CMS/[facility]) [I50.9]  Yes   • Uncontrolled type 2 diabetes mellitus (CMS/[facility]) [E11.65]  Yes   • Acute UTI (urinary tract infection) [N39.0]  Yes   • Acute DVT (deep venous thrombosis) (CMS/[facility]) [I82.409]  Yes   • Acute hypoxemic respiratory failure (CMS/HCC) [J96.01]  Yes      Resolved Hospital Problems    Diagnosis Date Resolved POA   • NSTEMI, initial episode of care (CMS/HCC) [I21.4] 07/06/2020 Yes        Brief Hospital Course to date:  Steffen Zeng is a 79 y.o. female with past medical history of CKD stage III, hyperlipidemia type 2 diabetes, hypertension.  Patient presented with left-sided weakness, initially concerning for CVA, however venous duplex did reveal acute left lower extremity DVT.  She is currently on Eliquis.    Plan  Acute left lower extremity DVT and PE  CTA chest shows nonobstructing thrombus in right mid and lower pulmonary arteries suggesting PE no evidence of RV strain  -continue Eliquis    Left-sided weakness, ?TIA  -Suspect this was secondary to above, not CVA , however CT perfusion was positive for ischemic changes in the left occipital lobe, CTA head and neck were unrevealing, MRI head did show small focus of encephalomalacia no evidence of acute infarction  -Cards rec d/c ASA as she is now on Eliquis  -Neurology following    CKD stage III-renal function seems to be at baseline, continue to monitor, avoid nephrotoxins.    Hypertension-BP currently elevated, currently on Coreg and Imdur, cardiology has started low-dose losartan, will continue for now adjust as appropriate    Heart failure with preserved EF  -Currently compensated, continue beta-blocker    Poorly controlled type 2 diabetes with A1c 8.2%  -FSBG's  have been reviewed and currently suboptimal  -Increase basal Levemir to 10 units bid, continue SSI    Suspected UTI-urine cultures NGTD, will DC Rocephin she has received 3 days of antibiotics    Hypothyroidism-continue Synthroid    Neuropathy-previously on Tegretol, still discontinued moving forward, some interaction with Eliquis    Right lower extremity, left lateral ankle wounds-WOC following    Chronic severe back pain/spinal stenosis    Anxiety/depression-continue Cymbalta    DVT Prophylaxis: Eliquis    Disposition: TBD    All problems listed above are new to me as this is my first encounter with patient    CODE STATUS:   Code Status and Medical Interventions:   Ordered at: 07/05/20 0205     Limited Support to NOT Include:    Cardioversion/Defibrillation    Intubation     Level Of Support Discussed With:    Next of Kin (If No Surrogate)     Code Status:    No CPR     Medical Interventions (Level of Support Prior to Arrest):    Limited     Comments:    Discussed with daughter at length, patient never wished to be on the ventilator or wanted CPR.         Electronically signed by Berna Mims MD, 07/07/20, 10:04.      Electronically signed by Berna Mims MD at 07/07/20 1004          Physical Therapy Notes (last 24 hours) (Notes from 07/07/20 0733 through 07/08/20 0733)      Monika Martin, PT at 07/07/20 1338  Version 1 of 1         Problem: Patient Care Overview  Goal: Plan of Care Review  Outcome: Ongoing (interventions implemented as appropriate)  Flowsheets (Taken 7/7/2020 1338)  Outcome Summary: Patient presents with generalized weakness and decreased activity tolerance affecting independence with mobility. She transferred STS with Min Ax 2 and ambulated 5ft with RW and Mod A x2 + chair follow, demonstrating decreased sequencing and weight shifting ability. Distance limited by weakness and fatigue. Skilled IP PT warranted. Recommend SNF at discharge to promote PLOF.       Electronically signed by Mario  Monika, PT at 20 1552     Monika Martin, PT at 20 1338  Version 1 of 1         Patient Name: Steffen Zeng  : 1940    MRN: [id number]                              Today's Date: 2020       Admit Date: 2020    Visit Dx:     ICD-10-CM ICD-9-CM   1. Impaired mobility and ADLs Z74.09 V49.89    Z78.9      Patient Active Problem List   Diagnosis   • Acute hypoxemic respiratory failure (CMS/HCC)   • Essential hypertension   • Coronary artery disease involving native coronary artery of native heart without angina pectoris   • Chronic heart failure (CMS/HCC)   • Uncontrolled type 2 diabetes mellitus (CMS/HCC)   • Left-sided weakness   • Acute UTI (urinary tract infection)   • Acute DVT (deep venous thrombosis) (CMS/HCC)   • History of CVA (cerebrovascular accident)   • Hyperlipidemia LDL goal <70   • CKD (chronic kidney disease) stage 3, GFR 30-59 ml/min (CMS/HCC)   • Demand ischemia (CMS/HCC)   • Acute pulmonary embolism without acute cor pulmonale (CMS/HCC)     Past Medical History:   Diagnosis Date   • CHF (congestive heart failure) (CMS/HCC)    • Coronary artery disease    • Diabetes mellitus (CMS/HCC)    • Disease of thyroid gland    • Elevated cholesterol    • Hypertension    • Stroke (CMS/HCC)      Past Surgical History:   Procedure Laterality Date   • APPENDECTOMY     • CARDIAC CATHETERIZATION     •  SECTION     • CHOLECYSTECTOMY     • HYSTERECTOMY     • KNEE ARTHROSCOPY Left      General Information     Row Name 20 1338          PT Evaluation Time/Intention    Document Type  evaluation  -NS     Mode of Treatment  individual therapy;physical therapy  -NS     Row Name 20 1338          General Information    Patient Profile Reviewed?  yes  -NS     Prior Level of Function  independent:;all household mobility;gait;transfer;bed mobility;ADL's Pt uses rollator and walks short distances at baseline. Many falls recently.  -NS     Existing Precautions/Restrictions  fall  -NS      Barriers to Rehab  previous functional deficit  -NS     [facility] Name 07/07/20 1338          Relationship/Environment    Lives With  alone  -NS     Row Name 07/07/20 1338          Resource/Environmental Concerns    Current Living Arrangements  home/apartment/condo  -NS     [facility] 07/07/20 1338          Home Main Entrance    Number of Stairs, Main Entrance  none  -NS     Row Name 07/07/20 1338          Stairs Within Home, Primary    Number of Stairs, Within Home, Primary  none  -NS     Row Name 07/07/20 1338          Cognitive Assessment/Intervention- PT/OT    Orientation Status (Cognition)  oriented x 4  -NS     Row Name 07/07/20 1338          Safety Issues, Functional Mobility    Safety Issues Affecting Function (Mobility)  insight into deficits/self awareness;safety precaution awareness;safety precautions follow-through/compliance;sequencing abilities  -NS     Impairments Affecting Function (Mobility)  balance;endurance/activity tolerance;strength;shortness of breath;coordination;motor planning;pain  -NS       User Key  (r) = Recorded By, (t) = Taken By, (c) = Cosigned By    Initials Name Provider Type    Monika Gross, PT Physical Therapist        Mobility     Row Name 07/07/20 1338          Bed Mobility Assessment/Treatment    Comment (Bed Mobility)  Patient UIC at start and end of session.  -NS     Row Name 07/07/20 1338          Transfer Assessment/Treatment    Comment (Transfers)  VCs for hand placement. Pt with mild unsteadiness upon standing.   -NS     [facility] Name 07/07/20 1338          Sit-Stand Transfer    Sit-Stand [place] (Transfers)  minimum assist (75% patient effort);2 person assist;verbal cues  -NS     Assistive Device (Sit-Stand Transfers)  walker, front-wheeled  -NS     Row Name 07/07/20 1338          Gait/Stairs Assessment/Training    Gait/Stairs Assessment/Training  gait/ambulation assistive device  -NS     [place] Level (Gait)  moderate assist (50% patient effort);2 person assist  -NS      Assistive Device (Gait)  walker, front-wheeled  -NS     Distance in Feet (Gait)  5  -NS     Pattern (Gait)  step-to  -NS     Deviations/Abnormal Patterns (Gait)  bilateral deviations;base of support, wide;waylon decreased;gait speed decreased;stride length decreased  -NS     Bilateral Gait Deviations  forward flexed posture;heel strike decreased;weight shift ability decreased  -NS     Comment (Gait/Stairs)  Patient ambulated at very slow gait speed with RW and Mod A x2 + chair follow. Pt required verbal and tactile cues for weight shifting and advancing BLEs. She demonstrated a wide REGINA and increased fatigue following gait. Chair brought up behind her.   -NS       User Key  (r) = Recorded By, (t) = Taken By, (c) = Cosigned By    Initials Name Provider Type    Monika Gross PT Physical Therapist        Obj/Interventions     Row Name 07/07/20 1338          General ROM    GENERAL ROM COMMENTS  BLEs: WFL  -NS     Row Name 07/07/20 1338          MMT (Manual Muscle Testing)    General MMT Comments  BLEs: grossly 4+/5  -NS     Row Name 07/07/20 1338          Static Sitting Balance    Level of [place] (Unsupported Sitting, Static Balance)  supervision  -NS     Sitting Position (Unsupported Sitting, Static Balance)  sitting in chair  -NS     Time Able to Maintain Position (Unsupported Sitting, Static Balance)  1 to 2 minutes  -NS     Row Name 07/07/20 1338          Dynamic Sitting Balance    Level of [place], Reaches Outside Midline (Sitting, Dynamic Balance)  supervision  -NS     Sitting Position, Reaches Outside Midline (Sitting, Dynamic Balance)  sitting in chair  -NS     Comment, Reaches Outside Midline (Sitting, Dynamic Balance)  MMT  -NS     Row Name 07/07/20 1338          Static Standing Balance    Level of [place] (Supported Standing, Static Balance)  minimal assist, 75% patient effort;2 person assist  -NS     Time Able to Maintain Position (Supported Standing, Static Balance)  15 to 30 seconds   -NS     Assistive Device Utilized (Supported Standing, Static Balance)  walker, rolling  -NS     Row Name 07/07/20 1338          Dynamic Standing Balance    Level of [place], Reaches Outside Midline (Standing, Dynamic Balance)  moderate assist, 50 to 74% patient effort;2 person assist  -NS     Time Able to Maintain Position, Reaches Outside Midline (Standing, Dynamic Balance)  1 to 2 minutes  -NS     Assistive Device Utilized (Supported Standing, Dynamic Balance)  walker, rolling  -NS     Row Name 07/07/20 1338          Sensory Assessment/Intervention    Sensory General Assessment  -- Pt has peripheral neuropathy in BLEs, with decreased light touch sensation extending up just past B knees.  -NS       User Key  (r) = Recorded By, (t) = Taken By, (c) = Cosigned By    Initials Name Provider Type    Monika Gross, PT Physical Therapist        Goals/Plan     Row Name 07/07/20 1335          Bed Mobility Goal 1 (PT)    Activity/Assistive Device (Bed Mobility Goal 1, PT)  sit to supine/supine to sit  -NS     [place] Level/Cues Needed (Bed Mobility Goal 1, PT)  contact guard assist  -NS     Time Frame (Bed Mobility Goal 1, PT)  2 weeks  -NS     Row Name 07/07/20 1339          Transfer Goal 1 (PT)    Activity/Assistive Device (Transfer Goal 1, PT)  sit-to-stand/stand-to-sit;bed-to-chair/chair-to-bed;walker, rolling  -NS     [place] Level/Cues Needed (Transfer Goal 1, PT)  contact guard assist  -NS     Time Frame (Transfer Goal 1, PT)  2 weeks  -NS     Row Name 07/07/20 1335          Gait Training Goal 1 (PT)    Activity/Assistive Device (Gait Training Goal 1, PT)  gait (walking locomotion);assistive device use;walker, rolling  -NS     [place] Level (Gait Training Goal 1, PT)  contact guard assist  -NS     Distance (Gait Goal 1, PT)  50  -NS     Time Frame (Gait Training Goal 1, PT)  2 weeks  -NS       User Key  (r) = Recorded By, (t) = Taken By, (c) = Cosigned By    Initials Name Provider Type    NS  Monika Martin, PT Physical Therapist        Clinical Impression     Row Name 07/07/20 1338          Pain Assessment    Additional Documentation  Pain Scale: Numbers Pre/Post-Treatment (Group)  -NS     Row Name 07/07/20 1338          Pain Scale: Numbers Pre/Post-Treatment    Pain Scale: Numbers, Pretreatment  4/10  -NS     Pain Scale: Numbers, Post-Treatment  4/10  -NS     Pain Location - Side  Left  -NS     Pain Location  groin  -NS     Pain Intervention(s)  Repositioned;Ambulation/increased activity  -NS     Row Name 07/07/20 1338          Plan of Care Review    Plan of Care Reviewed With  patient  -NS     Progress  no change PT eval  -NS     Row Name 07/07/20 1338          Physical Therapy Clinical Impression    Patient/Family Goals Statement (PT Clinical Impression)  To move around more easily.  -NS     Criteria for Skilled Interventions Met (PT Clinical Impression)  yes;treatment indicated  -NS     Rehab Potential (PT Clinical Summary)  good, to achieve stated therapy goals  -NS     Predicted Duration of Therapy (PT)  2 weeks  -NS     Row Name 07/07/20 1338          Vital Signs    Pre Systolic BP Rehab  134  -NS     Pre Treatment Diastolic BP  64  -NS     Post Systolic BP Rehab  140  -NS     Post Treatment Diastolic BP  62  -NS     Pretreatment Heart Rate (beats/min)  80  -NS     Posttreatment Heart Rate (beats/min)  77  -NS     Pre SpO2 (%)  95  -NS     O2 Delivery Pre Treatment  room air  -NS     Post SpO2 (%)  95  -NS     O2 Delivery Post Treatment  room air  -NS     Pre Patient Position  Sitting  -NS     Intra Patient Position  Standing  -NS     Post Patient Position  Sitting  -NS     Row Name 07/07/20 1338          Positioning and Restraints    Pre-Treatment Position  sitting in chair/recliner  -NS     Post Treatment Position  chair  -NS     In Chair  reclined;call light within reach;encouraged to call for assist;exit alarm on;with family/caregiver;waffle cushion;on mechanical lift sling;legs elevated;heels  elevated  -NS       User Key  (r) = Recorded By, (t) = Taken By, (c) = Cosigned By    Initials Name Provider Type    Monika Gross PT Physical Therapist        Outcome Measures     Row Name 07/07/20 1338          How much help from another person do you currently need...    Turning from your back to your side while in flat bed without using bedrails?  3  -NS     Moving from lying on back to sitting on the side of a flat bed without bedrails?  2  -NS     Moving to and from a bed to a chair (including a wheelchair)?  2  -NS     Standing up from a chair using your arms (e.g., wheelchair, bedside chair)?  3  -NS     Climbing 3-5 steps with a railing?  1  -NS     To walk in hospital room?  2  -NS     AM-PAC 6 Clicks Score (PT)  13  -NS     [facility] Name 07/07/20 1338          Modified [place] Scale    Pre-Stroke Modified [place] Scale  0 - No Symptoms at all.  -NS     Modified [place] Scale  4 - Moderately severe disability.  Unable to walk without assistance, and unable to attend to own bodily needs without assistance.  -NS     Row Name 07/07/20 1338          Functional Assessment    Outcome Measure Options  AM-PAC 6 Clicks Basic Mobility (PT);Modified [place]  -NS       User Key  (r) = Recorded By, (t) = Taken By, (c) = Cosigned By    Initials Name Provider Type    Monika Gross PT Physical Therapist        Physical Therapy Education                 Title: PT OT SLP Therapies (In Progress)     Topic: Physical Therapy (In Progress)     Point: Mobility training (Done)     Description:   Instruct learner(s) on safety and technique for assisting patient out of bed, chair or wheelchair.  Instruct in the proper use of assistive devices, such as walker, crutches, cane or brace.              Patient Friendly Description:   It's important to get you on your feet again, but we need to do so in a way that is safe for you. Falling has serious consequences, and your personal safety is the most important thing of all.        When  it's time to get out of bed, one of us or a family member will sit next to you on the bed to give you support.     If your doctor or nurse tells you to use a walker, crutches, a cane, or a brace, be sure you use it every time you get out of bed, even if you think you don't need it.    Learning Progress Summary           Patient Acceptance, E, VU,NR by NS at 7/7/2020 1551    Comment:  Patient was educated about PT POC, safety/sequencing with mobility, and rehab benefits.   Family Acceptance, E, VU,NR by NS at 7/7/2020 1551    Comment:  Patient was educated about PT POC, safety/sequencing with mobility, and rehab benefits.                   Point: Home exercise program (Not Started)     Description:   Instruct learner(s) on appropriate technique for monitoring, assisting and/or progressing patient with therapeutic exercises and activities.              Learner Progress:   Not documented in this visit.          Point: Body mechanics (Done)     Description:   Instruct learner(s) on proper positioning and spine alignment for patient and/or caregiver during mobility tasks and/or exercises.              Learning Progress Summary           Patient Acceptance, E, VU,NR by NS at 7/7/2020 1551    Comment:  Patient was educated about PT POC, safety/sequencing with mobility, and rehab benefits.   Family Acceptance, E, VU,NR by NS at 7/7/2020 1551    Comment:  Patient was educated about PT POC, safety/sequencing with mobility, and rehab benefits.                   Point: Precautions (Done)     Description:   Instruct learner(s) on prescribed precautions during mobility and gait tasks              Learning Progress Summary           Patient Acceptance, E, VU,NR by NS at 7/7/2020 1551    Comment:  Patient was educated about PT POC, safety/sequencing with mobility, and rehab benefits.   Family Acceptance, E, VU,NR by NS at 7/7/2020 1551    Comment:  Patient was educated about PT POC, safety/sequencing with mobility, and rehab  benefits.                               User Key     Initials Effective Dates Name Provider Type Discipline    NS 09/10/19 -  Monika Martin PT Physical Therapist PT              PT Recommendation and Plan  Planned Therapy Interventions (PT Eval): balance training, bed mobility training, gait training, home exercise program, neuromuscular re-education, patient/family education, strengthening, transfer training  Outcome Summary/Treatment Plan (PT)  Anticipated Discharge Disposition (PT): skilled nursing facility  Plan of Care Reviewed With: patient  Progress: no change(PT eval)  Outcome Summary: Patient presents with generalized weakness and decreased activity tolerance affecting independence with mobility. She transferred STS with Min Ax 2 and ambulated 5ft with RW and Mod A x2 + chair follow, demonstrating decreased sequencing and weight shifting ability. Distance limited by weakness and fatigue. Skilled IP PT warranted. Recommend SNF at discharge to promote PLOF.     Time Calculation:   PT Charges     Row Name 07/07/20 1338             Time Calculation    Start Time  1338  -NS      PT Received On  07/07/20  -NS      PT Goal Re-Cert Due Date  07/17/20  -NS        User Key  (r) = Recorded By, (t) = Taken By, (c) = Cosigned By    Initials Name Provider Type    Monika Gross PT Physical Therapist        Therapy Charges for Today     Code Description Service Date Service Provider Modifiers Qty    89507671253 HC PT THER SUPP EA 15 MIN 7/7/2020 Monika Martin, PT GP 2    71235652118 HC PT EVAL MOD COMPLEXITY 4 7/7/2020 Monika Martin, PT GP 1          PT G-Codes  Outcome Measure Options: AM-PAC 6 Clicks Basic Mobility (PT), Modified [place]  AM-PAC 6 Clicks Score (PT): 13  AM-PAC 6 Clicks Score (OT): 14  Modified [place] Scale: 4 - Moderately severe disability.  Unable to walk without assistance, and unable to attend to own bodily needs without assistance.    Monika Martin PT  7/7/2020         Electronically signed by  Monika Martin, PT at 20 1553          Occupational Therapy Notes (last 48 hours) (Notes from 20 through 20 07)      Bella Campbell, OT at 20 1012          Acute Care - Occupational Therapy Initial Evaluation   [place]     Patient Name: Steffen Zeng  : 1940  MRN: [id number]  Today's Date: 2020             Admit Date: 2020       ICD-10-CM ICD-9-CM   1. Impaired mobility and ADLs Z74.09 V49.89    Z78.9      Patient Active Problem List   Diagnosis   • Acute hypoxemic respiratory failure (CMS/HCC)   • Essential hypertension   • Coronary artery disease involving native coronary artery of native heart without angina pectoris   • Chronic heart failure (CMS/[facility])   • Uncontrolled type 2 diabetes mellitus (CMS/[facility])   • Left-sided weakness   • Acute UTI (urinary tract infection)   • Acute DVT (deep venous thrombosis) (CMS/[facility])   • History of CVA (cerebrovascular accident)   • NSTEMI, initial episode of care (CMS/[facility])   • Hyperlipidemia LDL goal <70     Past Medical History:   Diagnosis Date   • CHF (congestive heart failure) (CMS/[facility])    • Coronary artery disease    • Diabetes mellitus (CMS/[facility])    • Disease of thyroid gland    • Elevated cholesterol    • Hypertension    • Stroke (CMS/HCC)      Past Surgical History:   Procedure Laterality Date   • APPENDECTOMY     • CARDIAC CATHETERIZATION     •  SECTION     • CHOLECYSTECTOMY     • HYSTERECTOMY     • KNEE ARTHROSCOPY Left           OT ASSESSMENT FLOWSHEET (last 12 hours)      Occupational Therapy Evaluation     Row Name 20 1012                   OT Evaluation Time/Intention    Subjective Information  complains of;pain  -JAGDEEP        Document Type  evaluation  -JAGDEEP        Mode of Treatment  individual therapy;occupational therapy  -JAGDEEP        Patient Effort  good  -JAGDEEP           General Information    Patient Profile Reviewed?  yes  -JAGDEEP        Patient Observations  alert;cooperative;agree to therapy  -JAGDEEP         Patient/Family Observations  Pt. supine in bed with dtr., who is a nurse present.  -JAGDEEP        General Observations of Patient  Pt. sitting up in bed.  -JAGDEEP        Prior Level of Function  independent:;all household mobility;ADL's;max assist:;dependent:;home management;driving;shopping pt. heats up pre-prepared meals.  Per family multiple falls.  -JAGDEEP        Equipment Currently Used at Home  rollator;ramp;grab bar;bath bench  -JAGDEEP        Existing Precautions/Restrictions  fall DVT and on bedrest minus to bed  -JAGDEEP        Limitations/Impairments  hearing  -JAGDEEP        Risks Reviewed  LOB;increased discomfort;lines disloged;patient:;family:  -JAGDEEP        Benefits Reviewed  patient:;family:;improve function;increase independence;increase strength;increase balance;increase knowledge  -JAGDEEP        Barriers to Rehab  previous functional deficit  -JAGDEEP           Relationship/Environment    Lives With  alone  -JAGDEEP        Family Caregiver if Needed  child(mainor), adult dtr works and with children so not available at all times.  -JAGDEEP           Resource/Environmental Concerns    Current Living Arrangements  home/apartment/condo  -JAGDEEP           Cognitive Assessment/Intervention- PT/OT    Orientation Status (Cognition)  oriented x 3  -JAGDEEP        Follows Commands (Cognition)  follows one step commands;over 90% accuracy;repetition of directions required limited by [place]  -JAGDEEP        Safety Deficit (Cognitive)  safety precautions awareness;safety precautions follow-through/compliance;insight into deficits/self awareness  -JAGDEEP           Safety Issues, Functional Mobility    Safety Issues Affecting Function (Mobility)  safety precautions follow-through/compliance;safety precaution awareness;insight into deficits/self awareness  -JAGDEEP        Impairments Affecting Function (Mobility)  balance;endurance/activity tolerance;strength;shortness of breath  -JAGDEEP           Bed Mobility Assessment/Treatment    Bed Mobility Assessment/Treatment   supine-sit;sit-supine;scooting/bridging  -JAGDEEP        Scooting/Bridging Hall (Bed Mobility)  2 person assist;dependent (less than 25% patient effort) to HOB  -JAGDEEP        Supine-Sit Hall (Bed Mobility)  moderate assist (50% patient effort);2 person assist;nonverbal cues (demo/gesture);verbal cues  -JAGDEEP        Sit-Supine Hall (Bed Mobility)  moderate assist (50% patient effort);2 person assist;nonverbal cues (demo/gesture);verbal cues  -JAGDEEP        Bed Mobility, Safety Issues  decreased use of arms for pushing/pulling;decreased use of legs for bridging/pushing;impaired trunk control for bed mobility  -JAGDEEP        Assistive Device (Bed Mobility)  bed rails;head of bed elevated  -JAGDEEP        Comment (Bed Mobility)  per dtr. pt. only sleep in a recliner at home.  -JAGDEEP           Functional Mobility    Functional Mobility- Comment  pt. only allowed transfers to [facility].  -JAGDEEP           Transfer Assessment/Treatment    Transfer Assessment/Treatment  toilet transfer;stand-sit transfer;sit-stand transfer  -AJGDEEP           Sit-Stand Transfer    Sit-Stand Hall (Transfers)  moderate assist (50% patient effort);nonverbal cues (demo/gesture);verbal cues  -JAGDEEP        Assistive Device (Sit-Stand Transfers)  other (see comments) UE support  -JAGDEEP           Stand-Sit Transfer    Stand-Sit Hall (Transfers)  minimum assist (75% patient effort);2 person assist;nonverbal cues (demo/gesture);verbal cues  -JAGDEEP        Assistive Device (Stand-Sit Transfers)  other (see comments) gait belt and UE support  -JAGDEEP           Toilet Transfer    Type (Toilet Transfer)  stand-sit;sit-stand  -JAGDEEP        Hall Level (Toilet Transfer)  moderate assist (50% patient effort);nonverbal cues (demo/gesture);verbal cues  -JAGDEEP        Assistive Device (Toilet Transfer)  commode, bedside without drop arms  -           ADL Assessment/Intervention    37169 - OT Self Care/Mgmt Minutes  5  -JAGDEEP        BADL Assessment/Intervention  grooming;upper  body dressing;bathing;toileting  -JAGDEEP           Bathing Assessment/Intervention    Comment (Bathing)  dtr washed pt.s UB while sitting on BSC and while OT helping pt. back to bed washed aguilar area and buttocks.  -JAGDEEP           Lower Body Dressing Assessment/Training    Lower Body Dressing [place] Level  doff;don;socks;dependent (less than 25% patient effort)  -JAGDEEP        Comment (Lower Body Dressing)  per drt. pt. wears slip on shoes at home or gripper socks.  -JAGDEEP           Grooming Assessment/Training    [place] Level (Grooming)  grooming skills;oral care regimen;set up SBA  -JAGDEEP        Grooming Position  -- while sitting on BSC using bathroom  -JAGDEEP           Toileting Assessment/Training    Comment (Toileting)  dependent wiping, dtr. cleaned when OT helped pt. stand.  -JAGDEEP           BADL Safety/Performance    Impairments, BADL Safety/Performance  balance;endurance/activity tolerance;strength;trunk/postural control  -JAGDEEP        Skilled BADL Treatment/Intervention  cognitive/safety deficit modifications  -JAGDEEP           General ROM    GENERAL ROM COMMENTS  WFL AROM BUE  -JAGDEEP           MMT (Manual Muscle Testing)    General MMT Comments  BUE grossly 5/5  -JAGDEEP           Motor Assessment/Interventions    Additional Documentation  Balance (Group);Balance Interventions (Group);Therapeutic Exercise (Group);Therapeutic Exercise Interventions (Group)  -JAGDEEP           Therapeutic Exercise    07305 - OT Therapeutic Exercise Minutes  8  -JAGDEEP        51358 - OT Therapeutic Activity Minutes  5  -JAGDEEP           Therapeutic Exercise    Upper Extremity Range of Motion (Therapeutic Exercise)  shoulder flexion/extension, bilateral;shoulder abduction/adduction, bilateral;shoulder horizontal abduction/adduction, bilateral;elbow flexion/extension, bilateral  -JAGDEEP        Exercise Type (Therapeutic Exercise)  AROM (active range of motion);resistive exercises  -JAGDEEP        Position (Therapeutic Exercise)  supine  -JAGDEEP        Sets/Reps (Therapeutic  Exercise)  1/15  -JAGDEEP        Expected Outcome (Therapeutic Exercise)  improve functional tolerance, self-care activity;improve performance, transfer skills;improve performance, BADLs  -JAGDEEP        Comment (Therapeutic Exercise)  3 rest periods due to SOA  -JAGDEEP           Balance    Balance  static sitting balance;static standing balance;dynamic sitting balance;dynamic standing balance  -JAGDEEP           Static Sitting Balance    Level of [place] (Unsupported Sitting, Static Balance)  supervision  -JAGDEEP        Sitting Position (Unsupported Sitting, Static Balance)  sitting on edge of bed  -JAGDEEP           Dynamic Sitting Balance    Level of [place], Reaches Outside Midline (Sitting, Dynamic Balance)  standby assist  -JAGDEEP        Sitting Position, Reaches Outside Midline (Sitting, Dynamic Balance)  -- BSC  -JAGDEEP           Static Standing Balance    Level of [place] (Supported Standing, Static Balance)  minimal assist, 75% patient effort  -JAGDEEP        Time Able to Maintain Position (Supported Standing, Static Balance)  other (see comments) UE support  -JAGDEEP           Sensory Assessment/Intervention    Sensory General Assessment  no sensation deficits identified in BUE, per dtr. neuropathy BLE  -JAGDEEP        Additional Documentation  Hearing Assessment (Group)  -JAGDEEP           Hearing Assessment    Hearing Status  hearing impairment, bilaterally needs statements at times repeated several times  -JAGDEEP           Positioning and Restraints    Pre-Treatment Position  in bed  -JAGDEEP        Post Treatment Position  bed  -JAGDEEP        In Bed  supine;call light within reach;encouraged to call for assist;exit alarm on;with family/caregiver;legs elevated  -JAGDEEP           Pain Assessment    Additional Documentation  Pain Scale: Numbers Pre/Post-Treatment (Group)  -JAGDEEP           Pain Scale: Numbers Pre/Post-Treatment    Pain Scale: Numbers, Pretreatment  9/10 pt. appears to overrate pain based on ability and facial exp  -JAGDEEP        Pain Scale: Numbers,  Post-Treatment  9/10  -JAGDEEP        Pain Location - Side  Bilateral  -JAGDEEP        Pain Location - Orientation  lower  -JAGDEEP        Pain Location  back BLE's  -JAGDEEP        Pre/Post Treatment Pain Comment  chronic back pain and BLE pain  -JAGDEEP        Pain Intervention(s)  Repositioned;Medication (See MAR)  -JAGDEEP           Wound 07/05/20 0058 Left lower;anterior ankle Pressure Injury    Wound - Properties Group Date first assessed: 07/05/20  -MP Time first assessed: 0058  -MP Side: Left  -MP Orientation: lower;anterior  -MP Location: ankle  -MP Primary Wound Type: Pressure inj  -MP Stage, Pressure Injury: Stage 2  -MP       Wound 07/05/20 0058 Right anterior;lower leg Skin Tear    Wound - Properties Group Date first assessed: 07/05/20  -MP Time first assessed: 0058  -MP Side: Right  -MP Orientation: anterior;lower  -MP Location: leg  -MP Primary Wound Type: Skin tear  -MP Stage, Pressure Injury: unstageable  -MP       Wound 07/05/20 0058 Right medial gluteal Pressure Injury    Wound - Properties Group Date first assessed: 07/05/20  -MP Time first assessed: 0058  -MP Side: Right  -MP Orientation: medial  -MP Location: gluteal  -MP Primary Wound Type: Pressure inj  -MP Stage, Pressure Injury: Stage 2  -MP       Clinical Impression (OT)    OT Diagnosis  impaired ADL independence  -JAGDEEP        Patient/Family Goals Statement (OT Eval)  return to PLOF  -JAGDEEP        Criteria for Skilled Therapeutic Interventions Met (OT Eval)  yes;treatment indicated  -JAGDEEP        Rehab Potential (OT Eval)  good, to achieve stated therapy goals  -JAGDEEP        Therapy Frequency (OT Eval)  daily  -JAGDEEP        Care Plan Review (OT)  evaluation/treatment results reviewed;care plan/treatment goals reviewed;risks/benefits reviewed;current/potential barriers reviewed;patient/other agree to care plan  -JAGDEEP        Care Plan Review, Other Participant (OT Eval)  daughter  -JAGDEEP        Anticipated Discharge Disposition (OT)  skilled nursing facility  -JAGDEEP           Vital Signs     Pre Systolic BP Rehab  164  -JAGDEEP        Pre Treatment Diastolic BP  58  -JAGDEEP        Post Systolic BP Rehab  171  -JAGDEEP        Post Treatment Diastolic BP  80  -JAGDEEP        Pretreatment Heart Rate (beats/min)  77  -JAGDEEP        Posttreatment Heart Rate (beats/min)  85  -JAGDEEP        Pre SpO2 (%)  96  -JAGDEEP        O2 Delivery Pre Treatment  room air  -JAGDEEP        Post SpO2 (%)  98  -JAGDEEP        O2 Delivery Post Treatment  room air  -JAGDEEP        Pre Patient Position  Supine  -JAGDEEP        Intra Patient Position  Standing  -JAGDEEP        Post Patient Position  Supine  -JAGDEEP           Planned OT Interventions    Planned Therapy Interventions (OT Eval)  activity tolerance training;BADL retraining;functional balance retraining;occupation/activity based interventions;patient/caregiver education/training;ROM/therapeutic exercise;strengthening exercise;transfer/mobility retraining  -JAGDEEP           OT Goals    Transfer Goal Selection (OT)  transfer, OT goal 1  -JAGDEEP        Dressing Goal Selection (OT)  dressing, OT goal 1  -JAGDEEP        Toileting Goal Selection (OT)  toileting, OT goal 1  -JAGDEEP        Grooming Goal Selection (OT)  grooming, OT goal 1  -JAGDEEP        Additional Documentation  Grooming Goal Selection (OT) (Row)  -JAGDEEP           Transfer Goal 1 (OT)    Activity/Assistive Device (Transfer Goal 1, OT)  sit-to-stand/stand-to-sit;bed-to-chair/chair-to-bed;toilet;commode, bedside without drop arms;walker, rolling  -JAGDEEP        [place] Level/Cues Needed (Transfer Goal 1, OT)  minimum assist (75% or more patient effort);tactile cues required;verbal cues required  -JAGDEEP        Time Frame (Transfer Goal 1, OT)  long term goal (LTG);10 days  -JAGDEEP        Progress/Outcome (Transfer Goal 1, OT)  goal ongoing  -JAGDEEP           Dressing Goal 1 (OT)    Activity/Assistive Device (Dressing Goal 1, OT)  upper body dressing;lower body dressing AE prn  -JAGDEEP        [place]/Cues Needed (Dressing Goal 1, OT)  minimum assist (75% or more patient effort);verbal cues  required;tactile cues required  -JAGDEEP        Time Frame (Dressing Goal 1, OT)  long term goal (LTG);10 days  -JAGDEEP        Progress/Outcome (Dressing Goal 1, OT)  goal ongoing  -JAGDEEP           Toileting Goal 1 (OT)    Activity/Device (Toileting Goal 1, OT)  toileting skills, all;commode, bedside without drop arms;grab bar/safety frame;raised toilet seat  -JAGDEEP        [place] Level/Cues Needed (Toileting Goal 1, OT)  moderate assist (50-74% patient effort)  -JAGDEEP        Time Frame (Toileting Goal 1, OT)  long term goal (LTG);10 days  -JAGDEEP        Progress/Outcome (Toileting Goal 1, OT)  goal ongoing  -JAGDEEP           Grooming Goal 1 (OT)    Activity/Device (Grooming Goal 1, OT)  grooming skills, all sink side  -JAGDEEP        [place] (Grooming Goal 1, OT)  set-up required;contact guard assist  -JAGDEEP        Time Frame (Grooming Goal 1, OT)  long term goal (LTG);10 days  -JAGDEEP        Progress/Outcome (Grooming Goal 1, OT)  goal ongoing  -JAGDEEP           Living Environment    Home Accessibility  tub/shower is not walk in ramp, bar by toilet  -JAGDEEP          User Key  (r) = Recorded By, (t) = Taken By, (c) = Cosigned By    Initials Name Effective Dates    Bella Chamorro, OT 06/08/18 -     Brandt Kennedy RN 11/20/19 -          Occupational Therapy Education                 Title: PT OT SLP Therapies (In Progress)     Topic: Occupational Therapy (In Progress)     Point: ADL training (Done)     Description:   Instruct learner(s) on proper safety adaptation and remediation techniques during self care or transfers.   Instruct in proper use of assistive devices.              Learning Progress Summary           Patient Acceptance, E,D, VU,NR by JAGDEEP at 7/6/2020 1012    Comment:  reason for consult, evaluation results, goal setting, UE TE, transfer safety   Family Acceptance, E,D, VU,NR by JAGDEEP at 7/6/2020 1012    Comment:  reason for consult, evaluation results, goal setting, UE TE, transfer safety                   Point: Home exercise  program (Done)     Description:   Instruct learner(s) on appropriate technique for monitoring, assisting and/or progressing therapeutic exercises/activities.              Learning Progress Summary           Patient Acceptance, E,D, VU,NR by JAGDEEP at 7/6/2020 1012    Comment:  reason for consult, evaluation results, goal setting, UE TE, transfer safety   Family Acceptance, E,D, VU,NR by JAGDEEP at 7/6/2020 1012    Comment:  reason for consult, evaluation results, goal setting, UE TE, transfer safety                   Point: Precautions (Done)     Description:   Instruct learner(s) on prescribed precautions during self-care and functional transfers.              Learning Progress Summary           Patient Acceptance, E,D, VU,NR by JAGDEEP at 7/6/2020 1012    Comment:  reason for consult, evaluation results, goal setting, UE TE, transfer safety   Family Acceptance, E,D, VU,NR by JAGDEEP at 7/6/2020 1012    Comment:  reason for consult, evaluation results, goal setting, UE TE, transfer safety                   Point: Body mechanics (Not Started)     Description:   Instruct learner(s) on proper positioning and spine alignment during self-care, functional mobility activities and/or exercises.              Learner Progress:   Not documented in this visit.                      User Key     Initials Effective Dates Name Provider Type Discipline     06/08/18 -  Bella Campbell, OT Occupational Therapist OT                  OT Recommendation and Plan  Outcome Summary/Treatment Plan (OT)  Anticipated Discharge Disposition (OT): skilled nursing facility  Planned Therapy Interventions (OT Eval): activity tolerance training, BADL retraining, functional balance retraining, occupation/activity based interventions, patient/caregiver education/training, ROM/therapeutic exercise, strengthening exercise, transfer/mobility retraining  Therapy Frequency (OT Eval): daily  Plan of Care Review  Plan of Care Reviewed With: patient  Plan of Care Reviewed  With: patient    Outcome Measures     Row Name 07/06/20 1012             How much help from another is currently needed...    Putting on and taking off regular lower body clothing?  2  -JAGDEEP      Bathing (including washing, rinsing, and drying)  2  -JAGDEEP      Toileting (which includes using toilet bed pan or urinal)  2  -JAGDEEP      Putting on and taking off regular upper body clothing  2  -JAGDEEP      Taking care of personal grooming (such as brushing teeth)  3  -JAGDEEP      Eating meals  3  -JAGDEEP      AM-PAC 6 Clicks Score (OT)  14  -JAGDEPE         Modified [place] Scale    Pre-Stroke Modified [place] Scale  0 - No Symptoms at all.  -JAGDEEP      Modified [place] Scale  4 - Moderately severe disability.  Unable to walk without assistance, and unable to attend to own bodily needs without assistance.  -JAGDEEP         Functional Assessment    Outcome Measure Options  AM-PAC 6 Clicks Daily Activity (OT);Modified [place]  -JAGDEEP        User Key  (r) = Recorded By, (t) = Taken By, (c) = Cosigned By    Initials Name Provider Type    Bella Chamorro OT Occupational Therapist          Time Calculation:   Time Calculation- OT     Row Name 07/06/20 1126 07/06/20 1012          Time Calculation- OT    OT Start Time  1012  -JAGDEEP  --     OT Received On  07/06/20  -JAGDEEP  --     OT Goal Re-Cert Due Date  07/16/20  -JAGDEEP  --        Timed Charges    66336 - OT Therapeutic Exercise Minutes  --  8  -JAGDEEP     94434 - OT Therapeutic Activity Minutes  --  5  -JAGDEEP     24525 - OT Self Care/Mgmt Minutes  --  5  -JAGDEEP       User Key  (r) = Recorded By, (t) = Taken By, (c) = Cosigned By    Initials Name Provider Type    Bella Chamorro OT Occupational Therapist        Therapy Charges for Today     Code Description Service Date Service Provider Modifiers Qty    45769220423  OT THER PROC EA 15 MIN 7/6/2020 Bella Campbell OT GO 1    [id number]  OT EVAL LOW COMPLEXITY 4 7/6/2020 Bella Campbell OT GO 1               Bella Campbell OT  7/6/2020    Electronically signed by Adrian  Bella Neal OT at 07/06/20 1321     Bella Campbell OT at 07/06/20 1012          Problem: Patient Care Overview  Goal: Plan of Care Review  Outcome: Ongoing (interventions implemented as appropriate)  Flowsheets (Taken 7/6/2020 1123)  Consent Given to Review Plan with: OT evaluation completed.  Pt. mod of 1-2 with all bed mobility and transfer up to BSC.  Pt. able to perform grooming SBA, but max A with higher level ADL task.  Decreased standing balance and limited core strength.  Pt. appropriate for skilled OT services to promote return to PLOF.  Recommend SNF at discharge.  Plan of Care Reviewed With: patient  Patient Agreement with Plan of Care: agrees       Electronically signed by Bella Campbell OT at 07/06/20 1121

## 2020-07-08 NOTE — PROGRESS NOTES
[facility] Medicine Services  PROGRESS NOTE    Patient Name: Steffen Zeng  : 1940  MRN: [id number]    Date of Admission: 2020  Primary Care Physician: Provider, No Known    Subjective   Subjective     CC: Follow-up LLE DVT    HPI:No acute events overnight, patient slept well, denies any soa    Review of Systems  Gen- No fevers, chills  CV- No chest pain, palpitations  Resp- No cough, dyspnea  GI- No N/V/D, abd pain    All systems reviewed currently negative except as mentioned in HPI above    Objective   Objective     Vital Signs:   Temp:  [97.7 °F (36.5 °C)-98.2 °F (36.8 °C)] 97.8 °F (36.6 °C)  Heart Rate:  [72-87] 80  Resp:  [16-18] 18  BP: (104-143)/(60-66) 142/66  Total (NIH Stroke Scale): 1     Physical Exam:  Constitutional: Elderly lady, in no acute distress, awake, alert  HENT: NCAT, mucous membranes moist  Respiratory: Clear to auscultation bilaterally, respiratory effort normal   Cardiovascular: RRR, no murmurs, rubs, or gallops, palpable pedal pulses bilaterally  Gastrointestinal: obese, positive bowel sounds, soft, nontender, nondistended  Musculoskeletal:  bilateral ankle edema 1+  Psychiatric: Appropriate affect, cooperative  Neurologic: Oriented x 3, no focal deficits  Skin: No rashes    Results Reviewed:  Results from last 7 days   Lab Units 20  0709 20  0903 20  1045 20  0513 20  0055   WBC 10*3/mm3 8.32 9.84  --  12.87* 15.43*   HEMOGLOBIN g/dL 9.5* 12.1  --  11.4* 12.3   HEMATOCRIT % 29.5* 36.6  --  35.4 37.7   PLATELETS 10*3/mm3 155 153  --  138* 144   INR   --   --  1.12  --  1.16   PROCALCITONIN ng/mL  --   --   --   --  7.52*     Results from last 7 days   Lab Units 20  0709 20  0903 20  0513 20  0055   SODIUM mmol/L 137 139 138 138   POTASSIUM mmol/L 3.5 3.5 3.2* 3.5   CHLORIDE mmol/L 101 102 101 99   CO2 mmol/L 26.0 25.0 23.0 25.0   BUN mg/dL 19 17 22 23   CREATININE mg/dL 1.02* 0.96 1.10* 1.24*      GLUCOSE mg/dL 259* 294* 284* 334*   CALCIUM mg/dL 9.6 9.1 8.8 8.8   ALT (SGPT) U/L  --   --  35* 37*   AST (SGOT) U/L  --   --  60* 61*   TROPONIN T ng/mL  --   --  0.411* 0.447*     Estimated Creatinine Clearance: 52.4 mL/min (A) (by C-G formula based on SCr of 1.02 mg/dL (H)).    Microbiology Results Abnormal     Procedure Component Value - Date/Time    Urine Culture - Urine, Urine, Clean Catch [[id number]]  (Normal) Collected:  07/05/20 0723    Lab Status:  Final result Specimen:  Urine, Clean Catch Updated:  07/06/20 1030     Urine Culture No growth    COVID PRE-OP / PRE-PROCEDURE SCREENING ORDER (NO ISOLATION) - Swab, Nasopharynx [[id number]] Collected:  07/05/20 0137    Lab Status:  Final result Specimen:  Swab from Nasopharynx Updated:  07/05/20 0249    Narrative:       The following orders were created for panel order COVID PRE-OP / PRE-PROCEDURE SCREENING ORDER (NO ISOLATION) - Swab, Nasopharynx.  Procedure                               Abnormality         Status                     ---------                               -----------         ------                     COVID-19,CEPHEID,MYA IN-...[[id number]]  Normal              Final result                 Please view results for these tests on the individual orders.    COVID-19,CEPHEID,MYA IN-HOUSE(OR EMERGENT/ADD-ON),NP SWAB IN TRANSPORT MEDIA 3-4 HR TAT - Swab, Nasopharynx [[id number]]  (Normal) Collected:  07/05/20 0137    Lab Status:  Final result Specimen:  Swab from Nasopharynx Updated:  07/05/20 0249     COVID19 Not Detected    Narrative:       Fact sheet for providers: https://www.fda.gov/media/431445/download     Fact sheet for patients: https://www.fda.gov/media/697944/download          Imaging Results (Last 24 Hours)     Procedure Component Value Units Date/Time    CT Angiogram Chest With & Without Contrast [[id number]] Collected:  07/06/20 1451     Updated:  07/07/20 1053    Narrative:       EXAMINATION: CT ANGIOGRAM CHEST W WO  CONTRAST-07/06/2020:      INDICATION: DVT; Z74.09-Other reduced mobility; Z78.9-Other specified  health status, chest pain, hypoxia.     TECHNIQUE: Multiple axial CT imaging was obtained of the chest with and  without the administration of intravenous contrast.     The radiation dose reduction device was turned on for each scan per the  ALARA (As Low as Reasonably Achievable) protocol.     COMPARISON: NONE.     FINDINGS: The thyroid is enlarged and heterogeneous in appearance. Small  lymph nodes seen scattered throughout the mediastinum. Filling defects  seen in the right mid and lower lobe pulmonary arteries suggesting  thrombus and pulmonary emboli. The pulmonary emboli are incompletely  obstructing. RV-LV ratio is within normal limits. There are  bronchiectatic changes and wall thickening identified of the bronchioles  in the left lower lobe. There are mild increased markings suggesting  atelectatic change in the lung bases bilaterally. No dense  consolidation. No definite pleural effusion. Degenerative changes seen  within the spine. The visualized upper abdomen is unremarkable.       Impression:       Nonobstructing thrombus within the right mid and lower lobe  pulmonary arteries suggesting pulmonary emboli. Atelectatic changes seen  in the lung bases with bronchial wall thickening seen in the left lower  lobe bronchioles. The RV-LV ratio is within normal limits.     D:  07/06/2020  E:  07/06/2020           This report was finalized on 7/7/2020 10:50 AM by Dr. Karen Bermudez MD.             Results for orders placed during the hospital encounter of 07/04/20   Adult Transthoracic Echo Complete W/ Cont if Necessary Per Protocol    Narrative · Cardiac chamber sizes and wall thicknesses are normal. All wall   thicknesses are normal.  · Left ventricular systolic function appears to be at the lower limits of   normal to slightly depressed with an estimated ejection fraction of 51% -   55%.  · The trileaflet  aortic valve has some degree of calcific changes.   Significant aortic stenosis/aortic insufficiency is not seen.  · The mitral valve is normal in structure and function.  · There is moderate tricuspid regurgitation with an estimated right   ventricular systolic pressure of 43 mmHg, sugesting moderately severe PA   hypertension.  · An agitated saline study reveals no evidence of intracardiac shunting.  · There are no other important findings on this study.          I have reviewed the medications:  Scheduled Meds:    amitriptyline 10 mg Oral Nightly   apixaban 5 mg Oral Q12H   aspirin 81 mg Oral Daily   atorvastatin 80 mg Oral Nightly   carvedilol 12.5 mg Oral BID With Meals   castor oil-balsam peru  Topical Q12H   donepezil 10 mg Oral Nightly   DULoxetine 60 mg Oral Nightly   famotidine 40 mg Oral Daily   ferrous sulfate 325 mg Oral Daily With Breakfast   fluticasone 2 spray Each Nare Daily   insulin detemir 10 Units Subcutaneous Q12H   insulin lispro 0-7 Units Subcutaneous TID AC   insulin lispro 5 Units Subcutaneous TID With Meals   isosorbide mononitrate 30 mg Oral Q24H   levothyroxine 50 mcg Oral Daily   losartan 50 mg Oral Q24H   pregabalin 150 mg Oral BID   sodium chloride 10 mL Intravenous Q12H   sodium chloride 10 mL Intravenous Q12H     Continuous Infusions:   PRN Meds:.•  acetaminophen **OR** acetaminophen **OR** acetaminophen  •  acetaminophen  •  cyclobenzaprine  •  dextrose  •  dextrose  •  docusate sodium  •  glucagon (human recombinant)  •  HYDROcodone-acetaminophen  •  ipratropium-albuterol  •  LORazepam  •  Morphine  •  ondansetron **OR** ondansetron  •  sodium chloride  •  sodium chloride  •  trolamine salicylate    Assessment/Plan   Assessment & Plan     Active Hospital Problems    Diagnosis  POA   • **Left-sided weakness [R53.1]  Yes   • Acute pulmonary embolism without acute cor pulmonale (CMS/HCC) [I26.99]  Yes   • History of CVA (cerebrovascular accident) [Z86.73]  Not Applicable   •  Hyperlipidemia LDL goal <70 [E78.5]  Yes   • CKD (chronic kidney disease) stage 3, GFR 30-59 ml/min (CMS/[facility]) [N18.3]  Yes   • Demand ischemia (CMS/[facility]) [I24.8]  Yes   • Essential hypertension [I10]  Yes   • Coronary artery disease involving native coronary artery of native heart without angina pectoris [I25.10]  Yes   • Chronic heart failure (CMS/[facility]) [I50.9]  Yes   • Uncontrolled type 2 diabetes mellitus (CMS/[facility]) [E11.65]  Yes   • Acute UTI (urinary tract infection) [N39.0]  Yes   • Acute DVT (deep venous thrombosis) (CMS/[facility]) [I82.409]  Yes   • Acute hypoxemic respiratory failure (CMS/HCC) [J96.01]  Yes      Resolved Hospital Problems    Diagnosis Date Resolved POA   • NSTEMI, initial episode of care (CMS/[facility]) [I21.4] 07/06/2020 Yes        Brief Hospital Course to date:  Steffen Zeng is a 79 y.o. female with past medical history of CKD stage III, hyperlipidemia type 2 diabetes, hypertension.  Patient presented with left-sided weakness, initially concerning for CVA, however venous duplex did reveal acute left lower extremity DVT.  She is currently on Eliquis.     Plan  Acute left lower extremity DVT and PE  CTA chest shows nonobstructing thrombus in right mid and lower pulmonary arteries suggesting PE no evidence of RV strain  -continue Eliquis     Left-sided weakness, ?TIA  -Suspect this was secondary to above, not CVA , however CT perfusion was positive for ischemic changes in the left occipital lobe, CTA head and neck were unrevealing, MRI head did show small focus of encephalomalacia no evidence of acute infarction  -Cards rec d/c ASA as she is now on Eliquis,discussed with Dr. Chung, he would like to continue ASA  -Neurology has signed off, recommend PCP follow-up in 2 to 4 weeks post discharge, continue statin     CKD stage III-renal function seems to be at baseline, continue to monitor, avoid nephrotoxins.     Hypertension-BP currently stable, continue, currently on Coreg and Imdur, low-dose losartan, will  continue for now adjust as appropriate     Heart failure with preserved EF  -Currently compensated, continue beta-blocker     Poorly controlled type 2 diabetes with A1c 8.2%  -FSBG's have been reviewed and currently suboptimal but improving  -Increase basal Levemir to 10 units bid, add prandial Humalog 5 units 3 times daily, continue SSI     Suspected UTI-urine cultures NGTD, s/p 3 days of Rocephin.     Hypothyroidism-continue Synthroid     Neuropathy-previously on Tegretol, still discontinued moving forward, some interaction with Eliquis     Right lower extremity, left lateral ankle wounds-WOC following     Chronic severe back pain/spinal stenosis     Anxiety/depression-continue Cymbalta     DVT Prophylaxis: Eliquis     Disposition: TBD    CODE STATUS:   Code Status and Medical Interventions:   Ordered at: 07/05/20 0205     Limited Support to NOT Include:    Cardioversion/Defibrillation    Intubation     Level Of Support Discussed With:    Next of Kin (If No Surrogate)     Code Status:    No CPR     Medical Interventions (Level of Support Prior to Arrest):    Limited     Comments:    Discussed with daughter at length, patient never wished to be on the ventilator or wanted CPR.       Electronically signed by Berna Mims MD, 07/08/20, 10:20.     Partial Purse String (Simple) Text: Given the location of the defect and the characteristics of the surrounding skin a simple purse string closure was deemed most appropriate.  Undermining was performed circumfirentially around the surgical defect.  A purse string suture was then placed and tightened. Wound tension only allowed a partial closure of the circular defect.

## 2020-07-08 NOTE — PROGRESS NOTES
Continued Stay Note  [facility]     Patient Name: Steffen Zeng  MRN: [id number]  Today's Date: 7/8/2020    Admit Date: 7/4/2020    Discharge Plan     Row Name 07/08/20 0847       Plan    Plan  [facility]    Plan Comments  Spoke to patient at bedside. Plan is [facility] pending facility and insurance approval. Updated notes faxed to Karla at [facility] this morning. CM will continue to follow.    Final Discharge Disposition Code  03 - skilled nursing facility (SNF)        Discharge Codes    No documentation.             Kota Mayers RN

## 2020-07-08 NOTE — PROGRESS NOTES
Continued Stay Note  [facility]     Patient Name: Steffen Zeng  MRN: [id number]  Today's Date: 7/8/2020    Admit Date: 7/4/2020    Discharge Plan     Row Name 07/08/20 1352       Plan    Plan  [facility]    Patient/Family in Agreement with Plan  yes    Plan Comments  Spoke with Karla at [facility] and they cannot take the patient because patient does not meet their admission criteria. Spoke with Cher (daughter) and she would like to try [facility] for placement. Called and spoke to Jade at [facility] and faxed referral to [phone]. CM will continue to follow.    Row Name 07/08/20 1156       Plan    Plan  [facility]    Patient/Family in Agreement with Plan  yes    Plan Comments  Spoke with Karla at [facility] and she is going to look over the new notes and get back to me this afternoon. CM advised her that the patient is ready for transfer when they can accept. CM will continue to follow.    Final Discharge Disposition Code  03 - skilled nursing facility (SNF)        Discharge Codes    No documentation.       Expected Discharge Date and Time     Expected Discharge Date Expected Discharge Time    Jul 9, 2020             Kota Mayers RN

## 2020-07-08 NOTE — PLAN OF CARE
Pt up in chair for much of shift, successfully transferring x1 assist to bedside commode.  Daughter at bedside for much of day and involved in care. Plan is for pt to go to rehab and progress to home.

## 2020-07-08 NOTE — DISCHARGE PLACEMENT REQUEST
"Steffen Zeng (79 y.o. Female)   Carlos Mayers, RN Case Manager  [phone]  Looking for Short term rehab    Date of Birth Social Security Number Address Home Phone MRN    1940  [address] [place] [address] [phone] [id number]    [employer] Marital Status          Anabaptism        Admission Date Admission Type Admitting Provider Attending Provider Department, Room/Bed    7/4/20 Urgent Berna Mims MD Opii, [place], MD [facility] 3F, S314/1    Discharge Date Discharge Disposition Discharge Destination                       Attending Provider:  Berna Mims MD    Allergies:  No Known Allergies    Isolation:  None   Infection:  None   Code Status:  No CPR    Ht:  160 cm (63\")   Wt:  110 kg (242 lb)    Admission Cmt:  None   Principal Problem:  Left-sided weakness [R53.1] More...                 Active Insurance as of 7/4/2020     Primary Coverage     Payor Plan Insurance Group Employer/Plan Group    MEDICARE MEDICARE A & B      Payor Plan Address Payor Plan Phone Number Payor Plan Fax Number Effective Dates    [address] [phone]  10/1/2004 - None Entered    [facility] [id number]       Subscriber Name Subscriber Birth Date Member ID       STEFFEN ZENG 1940 [id number]           Secondary Coverage     Payor Plan Insurance Group Employer/Plan Group    [facility] SUPP [id number]     Payor Plan Address Payor Plan Phone Number Payor Plan Fax Number Effective Dates    [address]   12/1/2016 - None Entered    [facility] [id number]       Subscriber Name Subscriber Birth Date Member ID       STEFFEN ZENG 1940 [id number]                 Emergency Contacts      (Rel.) Home Phone Work Phone Mobile Phone    LongwoodCher (Daughter) [phone] -- --    Payal Luu (Sister) [phone] -- --               History & Physical      Radha Reed MD at 07/05/20 0046              [facility] Medicine Services  HISTORY " AND PHYSICAL    Patient Name: Steffen Zeng  : 1940  MRN: [id number]  Primary Care Physician: Provider, No Known  Date of admission: 2020      Subjective   Subjective     Chief Complaint:  Transferred from [facility] for stroke evaluation, patient moderate historian.  History obtained from the chart    HPI:  Steffen Zeng is a 79 y.o. female, presented to our hospital for stroke work-up.  As per records at 5 PM on 2020-daughter found patient slumped over on toilet, pale, very diaphoretic, had no strength in her extremities, awake but not answering questions-the last time she was known well was at 8:30 AM in the morning.  Patient was noted to have left-sided weakness- his NIH stroke scale-was reported as 9- for dysarthria, left upper/lower extremity weakness, facial droop.    On ED work-up patient was also found to have leukocytosis, troponin-0.48, EKG was reported as 1 mm ST depression in V5-V6 sinus tachycardia-currently there are no records available to review.    Patient herself complains chiefly of back pain currently, does not complain of headache, do not complain of any chest pain, or abdominal pain.  No acute illness is reported by patient.  Denies any complaint of cough, does complain of exertional dyspnea.  Patient cannot tell us whether she takes aspirin or Plavix at home, but does state that she has stroke affecting her left upper extremity in the past.    At the external ED patient was given aspirin 325 per rectum, Rocephin, erythromycin and heparin drip was initiated, normal saline at 500.    Review of Systems   Complete ROS done, which is negative except as above and HPI.  Old records reviewed and summarized in PM hx    Past medical history  DM 2  COPD  CHF  Hypertension  CAD- with history of MI in 2016  Morbidly obese  ?  History of CVA-affecting right upper extremity      Past surgical history- hysterectomy, stent placement  P hysterectomy, stent as past medical history past  medical history t medical history  Past medical historcal Histor  Family History: Patient could not give any detailed history.    Social History:  Currently non-smoker and no alcohol use.    Social History     Social History Narrative   • Not on file       Medications:  Available home medication information reviewed.  No medications prior to admission.       Not on File    Objective   Objective     Vital Signs:   Temp:  [96.5 °F (35.8 °C)] 96.5 °F (35.8 °C)  Heart Rate:  [86] 86  Resp:  [18] 18  BP: (132)/(83) 132/83   Total (NIH Stroke Scale): 8    Physical Exam     GENERAL-obese, not in distress  RS-decreased air entry anteriorly  CVS- s1s2 Regular, no murmur.  ABD-distended bowel sounds positive, nontender  EXT-2+ edema  NEURO- AAO-place, person not so much to time, speech is slow- but clear, finger  equal in both hands, left upper/lower extremity slightly weaker than the right, no obvious sensory deficit could be elicited, Babinski equivocal both extremity, deep tendon reflexes 2+,  EYES-extraocular movement intact, visual field could not be tested conjunctivae are normal. Pupils are equal, round, and reactive to light. No scleral icterus.   ENT- no external ear nose lesions, mucosa moist.  NECK- No JVD present. No tracheal deviation present. No thyromegaly present,No cervical lymphadenopathy.  JOINTS/MSK- no deformity, no swelling.  SKIN- no rash , warm to touch.  PSYCHIATRIC- Normal mood and affect. Behavior is normal. Thought content normal.     Results Reviewed:  I have personally reviewed current lab and radiology data.              Invalid input(s):  ALKPHOS  CrCl cannot be calculated (No successful lab value found.).  Brief Urine Lab Results     None        Imaging Results (Last 24 Hours)     Procedure Component Value Units Date/Time    CT Angiogram Neck [[id number]] Collected:  07/05/20 0017     Updated:  07/05/20 0019    Narrative:       CTA Neck, CTA Head    INDICATION:   Decreased responsiveness.  Abnormal perfusion study showing ischemic change in the left occipital lobe    TECHNIQUE:   CT angiogram of the head and neck with IV contrast. 3-D reconstructions were obtained and reviewed.  Evaluation for a significant carotid arterial stenosis is based on the NASCET criteria. Radiation dose reduction techniques included automated exposure  control or exposure modulation based on body size. Count of known CT and cardiac nuc med studies performed in previous 12 months: 0.     COMPARISON:   CT brain and CT perfusion study of the brain from today    FINDINGS:   CTA neck:  Lung apices clear. Thyroid gland is enlarged and has a low-density lesion in the right lobe measuring 2.6 cm in diameter. Submandibular and parotid glands are normal.    Vascular findings aortic arch is normal in size. Great vessels are all patent. The study slightly degraded by motion. It is difficult to see either vertebral artery in the lower neck. There is reconstitution of the more distal vertebral arteries and the  left one is dominant. 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 There are calcified plaques in each carotid bifurcation region without significant stenosis. The rest of the internal carotid arteries are patent.    CTA head:  The middle and anterior tissue arteries appear normal.. A right posterior cerebral artery appears be present but is a lot of venous opacification. Its difficult to clearly see the left posterior cerebral artery.      Impression:       Study is somewhat limited. There is poor evaluation of both posterior cerebral arteries. Probably the right one is present but is difficult see the proximal left PCA. It is also Also difficult see the proximal vertebral arteries in the neck.    No significant carotid stenosis is identified.    Signer Name: Franklin Bella MD   Signed: 7/5/2020 12:17 AM   Workstation Name: [email or web address]    [facility]    CT Angiogram Head [[id number]] Collected:  07/05/20 0017      Updated:  07/05/20 0019    Narrative:       CTA Neck, CTA Head    INDICATION:   Decreased responsiveness. Abnormal perfusion study showing ischemic change in the left occipital lobe    TECHNIQUE:   CT angiogram of the head and neck with IV contrast. 3-D reconstructions were obtained and reviewed.  Evaluation for a significant carotid arterial stenosis is based on the NASCET criteria. Radiation dose reduction techniques included automated exposure  control or exposure modulation based on body size. Count of known CT and cardiac nuc med studies performed in previous 12 months: 0.     COMPARISON:   CT brain and CT perfusion study of the brain from today    FINDINGS:   CTA neck:  Lung apices clear. Thyroid gland is enlarged and has a low-density lesion in the right lobe measuring 2.6 cm in diameter. Submandibular and parotid glands are normal.    Vascular findings aortic arch is normal in size. Great vessels are all patent. The study slightly degraded by motion. It is difficult to see either vertebral artery in the lower neck. There is reconstitution of the more distal vertebral arteries and the  left one is dominant. 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 There are calcified plaques in each carotid bifurcation region without significant stenosis. The rest of the internal carotid arteries are patent.    CTA head:  The middle and anterior tissue arteries appear normal.. A right posterior cerebral artery appears be present but is a lot of venous opacification. Its difficult to clearly see the left posterior cerebral artery.      Impression:       Study is somewhat limited. There is poor evaluation of both posterior cerebral arteries. Probably the right one is present but is difficult see the proximal left PCA. It is also Also difficult see the proximal vertebral arteries in the neck.    No significant carotid stenosis is identified.    Signer Name: Franklin Bella MD   Signed: 7/5/2020 12:17 AM   Workstation Name:  [facility]    [facility]    CT Cerebral Perfusion With & Without Contrast [[id number]] Collected:  07/05/20 0004     Updated:  07/05/20 0006    Narrative:       CT CEREBRAL PERFUSION W WO CONTRAST    HISTORY:       TECHNIQUE:   Axial CT images of the brain without and with intravenous contrast using cerebral perfusion protocol. Post-processing parametric maps were created using RAPID software and reviewed. Radiation dose reduction techniques included automated exposure control  or exposure modulation based on body size. CT and nuclear cardiology exams in the last 12 months: 0.    COMPARISON:   CT scan earlier today    FINDINGS:   Arterial input function is optimal.     Ischemic tissue volume (Tmax > 6 seconds, includes core and penumbra): 10 cc in the left occipital lobe  Ischemic core volume (rCBF < 30%): 0  Mismatch (penumbra) volume 10 cc, ratio and then it  Volume of poor collateral perfusion (Tmax > 10 seconds): 0  Hypoperfusion index (Tmax > 10 seconds/Tmax > 6 seconds): 0  CBV index: 0.1      Impression:       There is a 10 cc area of ischemic change in the left occipital lobe without visualized core infarct.    I have given the results to the patient's nurse (Karly) by telephone at 12 4:00 AM          Signer Name: Franklin Bella MD   Signed: 7/5/2020 12:04 AM   Workstation Name: [facility]    [facility]iology [facility]    CT Abdomen Pelvis Without Contrast [[id number]] Collected:  07/04/20 2356     Updated:  07/04/20 2358    Narrative:       CT Abdomen Pelvis WO    INDICATION:   Abdominal pain. Decreased responsiveness    TECHNIQUE:   CT of the abdomen and pelvis without IV contrast. Coronal and sagittal reconstructions were obtained.  Radiation dose reduction techniques included automated exposure control or exposure modulation based on body size. Count of known CT and cardiac nuc  med studies performed in previous 12 months: 0.     COMPARISON:   None  available.    FINDINGS:  Abdomen: The gallbladder is been removed. The liver, spleen, pancreas, adrenal glands and kidneys are normal in appearance. Aorta is normal in size. There is no adenopathy. Bowel is normal except for a few sigmoid diverticuli.    Pelvis: Uterus is been removed. There are no adnexal masses. The bladder is normal except for focal area of benign-appearing calcification so she with posterior wall. This is 12 mm in diameter.      Impression:       Negative CT of the abdomen and pelvis.          Signer Name: Franklin Bella MD   Signed: 7/4/2020 11:56 PM   Workstation Name: [facility]    CT Chest Without Contrast [[id number]] Collected:  07/04/20 2353     Updated:  07/04/20 2355    Narrative:       CT Chest WO    INDICATION:   Shortness of air tonight    TECHNIQUE:   CT of the thorax without IV contrast. Coronal and sagittal reconstructions were obtained.  Radiation dose reduction techniques included automated exposure control or exposure modulation based on body size. Count of known CT and cardiac nuc med studies  performed in previous 12 months: 0.     COMPARISON:   None available.    FINDINGS:  There is minimal basilar atelectasis otherwise the lungs are clear. The airways are patent. The thyroid tissue is enlarged bilaterally. The aorta is normal in size. There is no adenopathy. Bones are unremarkable.      Impression:       Minimal bibasilar atelectasis. Otherwise negative CT chest    Signer Name: Franklin Bella MD   Signed: 7/4/2020 11:53 PM   Workstation [facility]: [email or web address]    CT Head Without Contrast Stroke Protocol [[id number]] Collected:  07/04/20 2350     Updated:  07/04/20 2352    Narrative:       CT Head WO Code Stroke    HISTORY:   Decreased responsiveness tonight    TECHNIQUE:   Axial unenhanced head CT. Radiation dose reduction techniques included automated exposure control or exposure modulation based on body size.  Count of known CT and cardiac nuc med studies performed in previous 12 months: 0.     Time of scan: 11:40 PM    COMPARISON:   None.    FINDINGS:   No intracranial hemorrhage, mass, or infarct. No hydrocephalus or extra-axial fluid collection. Brain parenchymal density is normal. The skull base, calvarium, and extracranial soft tissues are normal.      Impression:       Normal, negative unenhanced head CT.          Signer Name: Franklin Bella MD   Signed: 7/4/2020 11:50 PM   Workstation Name: [email or web address]    [facility]             Ekg-  Cxr-    Assessment/Plan   Active Hospital Problems    Diagnosis POA   • Acute hypoxemic respiratory failure (CMS/HCC) [J96.01] Yes       Assessment & Plan   Acute onset of left-sided weakness-  CT perfusion-positive for ischemia changes in the left occipital lobe without any visualized cord infarct.  CT angiogram head and neck- limited study-please see the official report.  -Patient already seen by stroke navigator, and received aspirin 325 external ED, high-dose statin initiated.  - We will try to call the daughter.  (Need updated medication list).    Leukocytosis- history of recurrent UTI, already started on Rocephin will continue that.  -UA still pending please follow-up.    Troponin- 0.48, 0.44-no acute ST changes on EKG, no complaint of chest pain-we will continue to monitor.    CKD-monitor the creatinine.  BUN/creatinine 23/1.2    DM 2-current blood glucose is 329, will start with fingerstick coverage, currently patient n.p.o.    COPD?-initially patient was on 40% FiO2, not in any acute respiratory distress, once the patient settled down she was noted to be on 97% on 2 L nasal cannula.  - Patient had a rapid COVID testing done at the external ED which was negative, considering she was hypoxic initially, patient is again retested for COVID-19.    CHF/chronic pedal edema-on diuretics-Lasix, dose unknown-patient does appear to have pedal edema, home meds  unknown, proBNP sent, echocardiogram in a.m., currently patient is not overtly hypoxic-once we have more data- can start her on her home meds.  CT chest-minimal bibasilar atelectasis.    Hypertension-will allow permissive hypertension secondary to acute CVA.  As per daughter-on losartan 50 mg p.o. every 12, Imdur 30 mg p.o. daily    CAD- with history of MI in 2016- patient is unsure about her home medications, troponin elevated at the external ED at 0.4, EKG-sinus rhythm at 84 bpm, first-degree AV block, , Q waves in lead III and V1 and V2 no acute ST-T wave changes noted.  -Recently was seen by cardiologist in [place], offered her stress test which she declined.  -Follow-up troponin trend, currently holding the heparin drip until we get more results.    Morbidly obese- monitor for hypoxia during her sleep.    ?  History of CVA-affecting left upper extremity- which has completely resolved-currently on aspirin 81 mg.    Chronic severe back pain/spinal stenosis/neuropathy-on Lyrica, hydrocodone-q 6 hrly.-  Which family thinks needs to be restarted as soon as possible-given her history of almost.  Withdrawal symptoms in the past.    History of recurrent UTI-recently treated inpatient.    DVT prophylaxis:    -TEDs/SCDs  -Lovenox    CODE STATUS:    Code Status and Medical Interventions:   Ordered at: 07/04/20 0838     Level Of Support Discussed With:    Patient     Code Status:    CPR     Medical Interventions (Level of Support Prior to Arrest):    Full       Admission Communication  Due to current limited visitation policies, an attempt will be made to update patient's identified best point-of-contact(s) at admission to discuss plan of care.   Contact: shaggy Kwon   Relation:    Time of communication: phone call 2 am  -[phone]     Notes (if applicable):      Admission Status:  I believe this patient meets inpatient criteria secondary to need of work-up for stroke, altered mental  status.    Electronically signed by Radha Reed MD, 07/05/20, 12:46 AM.      Electronically signed by Radha Reed MD at 07/05/20 0342       Vital Signs (last day)     Date/Time   Temp   Temp src   Pulse   Resp   BP   Patient Position   SpO2    07/08/20 1101   97.4 (36.3)   Oral   74   18   117/52   Sitting   94    07/08/20 1020   --   --   92   --   --   --   93    07/08/20 0845   --   --   80   --   --   --   94    07/08/20 0734   97.8 (36.6)   Oral   79   18   142/66   Sitting   --    07/08/20 0320   98.2 (36.8)   --   81   16   143/66   Lying   92    07/07/20 1835   98 (36.7)   Oral   74   18   108/60   Lying   93    07/07/20 1800   --   --   80   --   --   --   95    07/07/20 1654   --   --   77   --   --   --   94    07/07/20 1518   97.7 (36.5)   Oral   75   16   104/62   Sitting   96    07/07/20 1400   --   --   76   --   140/62   --   95    07/07/20 1300   --   --   87   --   --   --   --    07/07/20 1200   --   --   72   --   --   --   91    07/07/20 1137   97.8 (36.6)   Oral   77   18   134/64   Sitting   --    07/07/20 1000   --   --   93   --   --   --   --    07/07/20 0805   97.8 (36.6)   Oral   78   18   162/83   Lying   94    07/07/20 0800   --   --   81   --   --   --   93    07/07/20 0451   98.3 (36.8)   Oral   79   18   131/77   Lying   91                Current Facility-Administered Medications   Medication Dose Route Frequency Provider Last Rate Last Dose   • acetaminophen (TYLENOL) tablet 650 mg  650 mg Oral Q4H PRN Radha Reed MD   650 mg at 07/06/20 1019    Or   • acetaminophen (TYLENOL) 160 MG/5ML solution 650 mg  650 mg Oral Q4H PRN Radha Reed MD        Or   • acetaminophen (TYLENOL) suppository 650 mg  650 mg Rectal Q4H PRN Radha Reed MD       • acetaminophen (TYLENOL) suppository 650 mg  650 mg Rectal Q4H PRN Radha Reed MD   650 mg at 07/05/20 0058   • amitriptyline (ELAVIL) tablet 10 mg  10 mg Oral Nightly Anna Castillo DO   10 mg at 07/07/20 6406   •  apixaban (ELIQUIS) tablet 5 mg  5 mg Oral Q12H Nahid Sanz MD   5 mg at 07/08/20 0540   • aspirin EC tablet 81 mg  81 mg Oral Daily Brooklyn Giron APRN   81 mg at 07/08/20 0830   • atorvastatin (LIPITOR) tablet 80 mg  80 mg Oral Nightly Blanca Tom APRN   80 mg at 07/07/20 2236   • carvedilol (COREG) tablet 12.5 mg  12.5 mg Oral BID With Meals Radha Reed MD   12.5 mg at 07/08/20 0830   • castor oil-balsam peru (VENELEX) ointment   Topical Q12H Anna Castillo DO       • cyclobenzaprine (FLEXERIL) tablet 10 mg  10 mg Oral TID PRN Anna Castillo DO   10 mg at 07/08/20 1228   • dextrose (D50W) 25 g/ 50mL Intravenous Solution 25 g  25 g Intravenous Q15 Min PRN Radha Reed MD       • dextrose (GLUTOSE) oral gel 15 g  15 g Oral Q15 Min PRN Radha Reed MD       • docusate sodium (COLACE) capsule 100 mg  100 mg Oral BID PRN Radha Reed MD       • donepezil (ARICEPT) tablet 10 mg  10 mg Oral Nightly Berna iMms MD   10 mg at 07/07/20 2236   • DULoxetine (CYMBALTA) DR capsule 60 mg  60 mg Oral Nightly Anna Castillo DO   60 mg at 07/07/20 2237   • famotidine (PEPCID) tablet 40 mg  40 mg Oral Daily Radha Reed MD   40 mg at 07/08/20 0830   • ferrous sulfate tablet 325 mg  325 mg Oral Daily With Breakfast Radha Reed MD   325 mg at 07/08/20 0830   • fluticasone (FLONASE) 50 MCG/ACT nasal spray 2 spray  2 spray Each Nare Daily Radha Reed MD   2 spray at 07/08/20 0829   • glucagon (human recombinant) (GLUCAGEN DIAGNOSTIC) injection 1 mg  1 mg Subcutaneous Q15 Min PRN Radha Reed MD       • HYDROcodone-acetaminophen (NORCO) 5-325 MG per tablet 1 tablet  1 tablet Oral Q6H PRN Shelli Vega APRN   1 tablet at 07/08/20 1228   • insulin detemir (LEVEMIR) injection 10 Units  10 Units Subcutaneous Q12H Berna Mims MD   10 Units at 07/08/20 0832   • insulin lispro (humaLOG) injection 0-7 Units  0-7 Units Subcutaneous TID AC Radha Reed MD   5  Units at 07/08/20 1222   • insulin lispro (humaLOG) injection 5 Units  5 Units Subcutaneous TID With Meals Berna Mims MD   5 Units at 07/08/20 1222   • ipratropium-albuterol (DUO-NEB) nebulizer solution 3 mL  3 mL Nebulization Q4H PRN Radha Reed MD       • isosorbide mononitrate (IMDUR) 24 hr tablet 30 mg  30 mg Oral Q24H Marie Maria APRN   30 mg at 07/08/20 0830   • levothyroxine (SYNTHROID, LEVOTHROID) tablet 50 mcg  50 mcg Oral Daily Radha Reed MD   50 mcg at 07/08/20 0540   • LORazepam (ATIVAN) tablet 1 mg  1 mg Oral Nightly PRN Anna Castillo, DO   1 mg at 07/07/20 2236   • losartan (COZAAR) tablet 50 mg  50 mg Oral Q24H Marie Maria APRN   50 mg at 07/08/20 0830   • morphine injection 2 mg  2 mg Intravenous Q4H PRN Anna Castillo, DO   2 mg at 07/07/20 2332   • ondansetron (ZOFRAN) tablet 4 mg  4 mg Oral Q6H PRN Radha Reed MD        Or   • ondansetron (ZOFRAN) injection 4 mg  4 mg Intravenous Q6H PRN Radha Reed MD   4 mg at 07/06/20 1310   • pregabalin (LYRICA) capsule 150 mg  150 mg Oral BID Berna Mims MD   150 mg at 07/08/20 0830   • sodium chloride 0.9 % flush 10 mL  10 mL Intravenous Q12H Blanca Tom APRN   10 mL at 07/08/20 0838   • sodium chloride 0.9 % flush 10 mL  10 mL Intravenous PRN Blanca Tom APRN       • sodium chloride 0.9 % flush 10 mL  10 mL Intravenous Q12H Radha Reed MD   10 mL at 07/08/20 0839   • sodium chloride 0.9 % flush 10 mL  10 mL Intravenous PRN Radha Reed MD       • trolamine salicylate (ASPERCREME) 10 % cream   Topical 4x Daily PRN Shelli Vega, APRN           Imaging Results (Last 72 Hours)     Procedure Component Value Units Date/Time    CT Angiogram Chest With & Without Contrast [[id number]] Collected:  07/06/20 1451     Updated:  07/07/20 1053    Narrative:       EXAMINATION: CT ANGIOGRAM CHEST W WO CONTRAST-07/06/2020:      INDICATION: DVT; Z74.09-Other reduced mobility; Z78.9-Other  specified  health status, chest pain, hypoxia.     TECHNIQUE: Multiple axial CT imaging was obtained of the chest with and  without the administration of intravenous contrast.     The radiation dose reduction device was turned on for each scan per the  ALARA (As Low as Reasonably Achievable) protocol.     COMPARISON: NONE.     FINDINGS: The thyroid is enlarged and heterogeneous in appearance. Small  lymph nodes seen scattered throughout the mediastinum. Filling defects  seen in the right mid and lower lobe pulmonary arteries suggesting  thrombus and pulmonary emboli. The pulmonary emboli are incompletely  obstructing. RV-LV ratio is within normal limits. There are  bronchiectatic changes and wall thickening identified of the bronchioles  in the left lower lobe. There are mild increased markings suggesting  atelectatic change in the lung bases bilaterally. No dense  consolidation. No definite pleural effusion. Degenerative changes seen  within the spine. The visualized upper abdomen is unremarkable.       Impression:       Nonobstructing thrombus within the right mid and lower lobe  pulmonary arteries suggesting pulmonary emboli. Atelectatic changes seen  in the lung bases with bronchial wall thickening seen in the left lower  lobe bronchioles. The RV-LV ratio is within normal limits.     D:  07/06/2020  E:  07/06/2020           This report was finalized on 7/7/2020 10:50 AM by Dr. Karen Bermudez MD.       MRI Brain Without Contrast [[id number]] Collected:  07/05/20 1346     Updated:  07/05/20 2251    Narrative:       EXAMINATION: MRI BRAIN WO CONTRAST - 07/05/2020     INDICATION: Left-sided weakness. Evaluate for stroke.     TECHNIQUE: Sagittal and axial T1 and axial T2 FLAIR and  diffusion-weighted images of the brain, axial T2 Flash images.     COMPARISON: Head CT scan and cerebral perfusion exam of 07/04/2020     FINDINGS: Perfusion study report indicates approximately 10 mL volume  area of ischemia in the  left occipital lobe by rapid analysis. Today's  study shows no evidence of restricted diffusion to suggest acute  infarction the left occipital lobe or elsewhere. Remaining sequences  show a small focus of encephalomalacia in the medial left temporal lobe  and perhaps trace amount of gliosis in the same location of the right  occipital lobe. There is age-appropriate generalized cerebral atrophy  elsewhere. There is relatively mild nonconfluent central white matter  disease. There are no signal changes suggestive of hemorrhage, no  evidence of mass, mass effect, hydrocephalus, or abnormal extra-axial  collection. Sagittal images show the midline structures to appear  grossly normal. Anatomy of the craniocervical junction appears normal.  There is expected flow signal in the major intracranial arteries and in  the median sagittal sinus. There appears to be chronic opacification  left maxillary sinus. Remaining paranasal sinuses appear grossly clear.       Impression:       1. Small focus of encephalomalacia in the left occipital lobe.. No  evidence of acute infarction here or elsewhere.  2. Chronic-appearing changes of the aging brain. No evidence of acute  intracranial disease.     DICTATED:   2020  EDITED/ls :   2020      This report was finalized on 2020 10:48 PM by Dr. Matt Malave MD.              Physician Progress Notes (last 24 hours) (Notes from 20 1346 through 20 1346)      Berna Mims MD at 20 1020              [facility]  PROGRESS NOTE    Patient Name: Steffen Zeng  : 1940  MRN: [id number]    Date of Admission: 2020  Primary Care Physician: Provider, No Known    Subjective   Subjective     CC: Follow-up LLE DVT    HPI:No acute events overnight, patient slept well, denies any soa    Review of Systems  Gen- No fevers, chills  CV- No chest pain, palpitations  Resp- No cough, dyspnea  GI- No N/V/D, abd pain    All systems  reviewed currently negative except as mentioned in HPI above    Objective   Objective     Vital Signs:   Temp:  [97.7 °F (36.5 °C)-98.2 °F (36.8 °C)] 97.8 °F (36.6 °C)  Heart Rate:  [72-87] 80  Resp:  [16-18] 18  BP: (104-143)/(60-66) 142/66  Total (NIH Stroke Scale): 1     Physical Exam:  Constitutional: Elderly lady, in no acute distress, awake, alert  HENT: NCAT, mucous membranes moist  Respiratory: Clear to auscultation bilaterally, respiratory effort normal   Cardiovascular: RRR, no murmurs, rubs, or gallops, palpable pedal pulses bilaterally  Gastrointestinal: obese, positive bowel sounds, soft, nontender, nondistended  Musculoskeletal:  bilateral ankle edema 1+  Psychiatric: Appropriate affect, cooperative  Neurologic: Oriented x 3, no focal deficits  Skin: No rashes    Results Reviewed:  Results from last 7 days   Lab Units 07/08/20  0709 07/06/20  0903 07/05/20  1045 07/05/20  0513 07/05/20  0055   WBC 10*3/mm3 8.32 9.84  --  12.87* 15.43*   HEMOGLOBIN g/dL 9.5* 12.1  --  11.4* 12.3   HEMATOCRIT % 29.5* 36.6  --  35.4 37.7   PLATELETS 10*3/mm3 155 153  --  138* 144   INR   --   --  1.12  --  1.16   PROCALCITONIN ng/mL  --   --   --   --  7.52*     Results from last 7 days   Lab Units 07/08/20  0709 07/06/20  0903 07/05/20  0513 07/05/20  0055   SODIUM mmol/L 137 139 138 138   POTASSIUM mmol/L 3.5 3.5 3.2* 3.5   CHLORIDE mmol/L 101 102 101 99   CO2 mmol/L 26.0 25.0 23.0 25.0   BUN mg/dL 19 17 22 23   CREATININE mg/dL 1.02* 0.96 1.10* 1.24*   GLUCOSE mg/dL 259* 294* 284* 334*   CALCIUM mg/dL 9.6 9.1 8.8 8.8   ALT (SGPT) U/L  --   --  35* 37*   AST (SGOT) U/L  --   --  60* 61*   TROPONIN T ng/mL  --   --  0.411* 0.447*     Estimated Creatinine Clearance: 52.4 mL/min (A) (by C-G formula based on SCr of 1.02 mg/dL (H)).    Microbiology Results Abnormal     Procedure Component Value - Date/Time    Urine Culture - Urine, Urine, Clean Catch [[id number]]  (Normal) Collected:  07/05/20 9552    Lab Status:  Final  result Specimen:  Urine, Clean Catch Updated:  07/06/20 1030     Urine Culture No growth    COVID PRE-OP / PRE-PROCEDURE SCREENING ORDER (NO ISOLATION) - Swab, Nasopharynx [[id number]] Collected:  07/05/20 0137    Lab Status:  Final result Specimen:  Swab from Nasopharynx Updated:  07/05/20 0249    Narrative:       The following orders were created for panel order COVID PRE-OP / PRE-PROCEDURE SCREENING ORDER (NO ISOLATION) - Swab, Nasopharynx.  Procedure                               Abnormality         Status                     ---------                               -----------         ------                     COVID-19,CEPHEID,MYA IN-...[[id number]]  Normal              Final result                 Please view results for these tests on the individual orders.    COVID-19,CEPHEID,MYA IN-HOUSE(OR EMERGENT/ADD-ON),NP SWAB IN TRANSPORT MEDIA 3-4 HR TAT - Swab, Nasopharynx [[id number]]  (Normal) Collected:  07/05/20 0137    Lab Status:  Final result Specimen:  Swab from Nasopharynx Updated:  07/05/20 0249     COVID19 Not Detected    Narrative:       Fact sheet for providers: https://www.fda.gov/media/505845/download     Fact sheet for patients: https://www.fda.gov/media/980411/download          Imaging Results (Last 24 Hours)     Procedure Component Value Units Date/Time    CT Angiogram Chest With & Without Contrast [[id number]] Collected:  07/06/20 1451     Updated:  07/07/20 1053    Narrative:       EXAMINATION: CT ANGIOGRAM CHEST W WO CONTRAST-07/06/2020:      INDICATION: DVT; Z74.09-Other reduced mobility; Z78.9-Other specified  health status, chest pain, hypoxia.     TECHNIQUE: Multiple axial CT imaging was obtained of the chest with and  without the administration of intravenous contrast.     The radiation dose reduction device was turned on for each scan per the  ALARA (As Low as Reasonably Achievable) protocol.     COMPARISON: NONE.     FINDINGS: The thyroid is enlarged and heterogeneous in appearance.  Small  lymph nodes seen scattered throughout the mediastinum. Filling defects  seen in the right mid and lower lobe pulmonary arteries suggesting  thrombus and pulmonary emboli. The pulmonary emboli are incompletely  obstructing. RV-LV ratio is within normal limits. There are  bronchiectatic changes and wall thickening identified of the bronchioles  in the left lower lobe. There are mild increased markings suggesting  atelectatic change in the lung bases bilaterally. No dense  consolidation. No definite pleural effusion. Degenerative changes seen  within the spine. The visualized upper abdomen is unremarkable.       Impression:       Nonobstructing thrombus within the right mid and lower lobe  pulmonary arteries suggesting pulmonary emboli. Atelectatic changes seen  in the lung bases with bronchial wall thickening seen in the left lower  lobe bronchioles. The RV-LV ratio is within normal limits.     D:  07/06/2020  E:  07/06/2020           This report was finalized on 7/7/2020 10:50 AM by Dr. Karen Bermudez MD.             Results for orders placed during the hospital encounter of 07/04/20   Adult Transthoracic Echo Complete W/ Cont if Necessary Per Protocol    Narrative · Cardiac chamber sizes and wall thicknesses are normal. All wall   thicknesses are normal.  · Left ventricular systolic function appears to be at the lower limits of   normal to slightly depressed with an estimated ejection fraction of 51% -   55%.  · The trileaflet aortic valve has some degree of calcific changes.   Significant aortic stenosis/aortic insufficiency is not seen.  · The mitral valve is normal in structure and function.  · There is moderate tricuspid regurgitation with an estimated right   ventricular systolic pressure of 43 mmHg, sugesting moderately severe PA   hypertension.  · An agitated saline study reveals no evidence of intracardiac shunting.  · There are no other important findings on this study.          I have reviewed  the medications:  Scheduled Meds:    amitriptyline 10 mg Oral Nightly   apixaban 5 mg Oral Q12H   aspirin 81 mg Oral Daily   atorvastatin 80 mg Oral Nightly   carvedilol 12.5 mg Oral BID With Meals   castor oil-balsam peru  Topical Q12H   donepezil 10 mg Oral Nightly   DULoxetine 60 mg Oral Nightly   famotidine 40 mg Oral Daily   ferrous sulfate 325 mg Oral Daily With Breakfast   fluticasone 2 spray Each Nare Daily   insulin detemir 10 Units Subcutaneous Q12H   insulin lispro 0-7 Units Subcutaneous TID AC   insulin lispro 5 Units Subcutaneous TID With Meals   isosorbide mononitrate 30 mg Oral Q24H   levothyroxine 50 mcg Oral Daily   losartan 50 mg Oral Q24H   pregabalin 150 mg Oral BID   sodium chloride 10 mL Intravenous Q12H   sodium chloride 10 mL Intravenous Q12H     Continuous Infusions:   PRN Meds:.•  acetaminophen **OR** acetaminophen **OR** acetaminophen  •  acetaminophen  •  cyclobenzaprine  •  dextrose  •  dextrose  •  docusate sodium  •  glucagon (human recombinant)  •  HYDROcodone-acetaminophen  •  ipratropium-albuterol  •  LORazepam  •  Morphine  •  ondansetron **OR** ondansetron  •  sodium chloride  •  sodium chloride  •  trolamine salicylate    Assessment/Plan   Assessment & Plan     Active Hospital Problems    Diagnosis  POA   • **Left-sided weakness [R53.1]  Yes   • Acute pulmonary embolism without acute cor pulmonale (CMS/HCC) [I26.99]  Yes   • History of CVA (cerebrovascular accident) [Z86.73]  Not Applicable   • Hyperlipidemia LDL goal <70 [E78.5]  Yes   • CKD (chronic kidney disease) stage 3, GFR 30-59 ml/min (CMS/HCC) [N18.3]  Yes   • Demand ischemia (CMS/[facility]) [I24.8]  Yes   • Essential hypertension [I10]  Yes   • Coronary artery disease involving native coronary artery of native heart without angina pectoris [I25.10]  Yes   • Chronic heart failure (CMS/[facility]) [I50.9]  Yes   • Uncontrolled type 2 diabetes mellitus (CMS/[facility]) [E11.65]  Yes   • Acute UTI (urinary tract infection) [N39.0]  Yes   •  Acute DVT (deep venous thrombosis) (CMS/[facility]) [I82.409]  Yes   • Acute hypoxemic respiratory failure (CMS/[facility]) [J96.01]  Yes      Resolved Hospital Problems    Diagnosis Date Resolved POA   • NSTEMI, initial episode of care (CMS/[facility]) [I21.4] 07/06/2020 Yes        Brief Hospital Course to date:  Steffen Zeng is a 79 y.o. female with past medical history of CKD stage III, hyperlipidemia type 2 diabetes, hypertension.  Patient presented with left-sided weakness, initially concerning for CVA, however venous duplex did reveal acute left lower extremity DVT.  She is currently on Eliquis.     Plan  Acute left lower extremity DVT and PE  CTA chest shows nonobstructing thrombus in right mid and lower pulmonary arteries suggesting PE no evidence of RV strain  -continue Eliquis     Left-sided weakness, ?TIA  -Suspect this was secondary to above, not CVA , however CT perfusion was positive for ischemic changes in the left occipital lobe, CTA head and neck were unrevealing, MRI head did show small focus of encephalomalacia no evidence of acute infarction  -Cards rec d/c ASA as she is now on Eliquis,discussed with Dr. Chung, he would like to continue ASA  -Neurology has signed off, recommend PCP follow-up in 2 to 4 weeks post discharge, continue statin     CKD stage III-renal function seems to be at baseline, continue to monitor, avoid nephrotoxins.     Hypertension-BP currently stable, continue, currently on Coreg and Imdur, low-dose losartan, will continue for now adjust as appropriate     Heart failure with preserved EF  -Currently compensated, continue beta-blocker     Poorly controlled type 2 diabetes with A1c 8.2%  -FSBG's have been reviewed and currently suboptimal but improving  -Increase basal Levemir to 10 units bid, add prandial Humalog 5 units 3 times daily, continue SSI     Suspected UTI-urine cultures NGTD,  s/p 3 days of Rocephin.     Hypothyroidism-continue Synthroid     Neuropathy-previously on Tegretol, still  discontinued moving forward, some interaction with Eliquis     Right lower extremity, left lateral ankle wounds-WOC following     Chronic severe back pain/spinal stenosis     Anxiety/depression-continue Cymbalta     DVT Prophylaxis: Eliquis     Disposition: TBD    CODE STATUS:   Code Status and Medical Interventions:   Ordered at: 20 0205     Limited Support to NOT Include:    Cardioversion/Defibrillation    Intubation     Level Of Support Discussed With:    Next of Kin (If No Surrogate)     Code Status:    No CPR     Medical Interventions (Level of Support Prior to Arrest):    Limited     Comments:    Discussed with daughter at length, patient never wished to be on the ventilator or wanted CPR.       Electronically signed by Berna Mims MD, 20, 10:20.      Electronically signed by Berna Mims MD at 20 1054          Physical Therapy Notes (last 48 hours) (Notes from 20 1346 through 20 1346)      Monika Martin PT at 20 1338  Version 1 of 1         Problem: Patient Care Overview  Goal: Plan of Care Review  Outcome: Ongoing (interventions implemented as appropriate)  Flowsheets (Taken 2020 1338)  Outcome Summary: Patient presents with generalized weakness and decreased activity tolerance affecting independence with mobility. She transferred STS with Min Ax 2 and ambulated 5ft with RW and Mod A x2 + chair follow, demonstrating decreased sequencing and weight shifting ability. Distance limited by weakness and fatigue. Skilled IP PT warranted. Recommend SNF at discharge to promote PLOF.       Electronically signed by Monika Martin PT at 20 1552     Monika Martin PT at 20 1338  Version 1 of 1         Patient Name: Steffen Zeng  : 1940    MRN: [id number]                              Today's Date: 2020       Admit Date: 2020    Visit Dx:     ICD-10-CM ICD-9-CM   1. Impaired mobility and ADLs Z74.09 V49.89    Z78.9      Patient Active Problem List    Diagnosis   • Acute hypoxemic respiratory failure (CMS/[facility])   • Essential hypertension   • Coronary artery disease involving native coronary artery of native heart without angina pectoris   • Chronic heart failure (CMS/[facility])   • Uncontrolled type 2 diabetes mellitus (CMS/[facility])   • Left-sided weakness   • Acute UTI (urinary tract infection)   • Acute DVT (deep venous thrombosis) (CMS/[facility])   • History of CVA (cerebrovascular accident)   • Hyperlipidemia LDL goal <70   • CKD (chronic kidney disease) stage 3, GFR 30-59 ml/min (CMS/[facility])   • Demand ischemia (CMS/[facility])   • Acute pulmonary embolism without acute cor pulmonale (CMS/[facility])     Past Medical History:   Diagnosis Date   • CHF (congestive heart failure) (CMS/[facility])    • Coronary artery disease    • Diabetes mellitus (CMS/[facility])    • Disease of thyroid gland    • Elevated cholesterol    • Hypertension    • Stroke (CMS/[facility])      Past Surgical History:   Procedure Laterality Date   • APPENDECTOMY     • CARDIAC CATHETERIZATION     •  SECTION     • CHOLECYSTECTOMY     • HYSTERECTOMY     • KNEE ARTHROSCOPY Left      General Information     Row Name 20 6433          PT Evaluation Time/Intention    Document Type  evaluation  -NS     Mode of Treatment  individual therapy;physical therapy  -NS     Row Name 20 1335          General Information    Patient Profile Reviewed?  yes  -NS     Prior Level of Function  independent:;all household mobility;gait;transfer;bed mobility;ADL's Pt uses rollator and walks short distances at baseline. Many falls recently.  -NS     Existing Precautions/Restrictions  fall  -NS     Barriers to Rehab  previous functional deficit  -NS     Row Name 20 1335          Relationship/Environment    Lives With  alone  -NS     Row Name 20 7158          Resource/Environmental Concerns    Current Living Arrangements  home/apartment/condo  -NS     Row Name 20 133          Home Main Entrance    Number of Stairs, Main Entrance   none  -NS     [facility] Name 07/07/20 1338          Stairs Within Home, Primary    Number of Stairs, Within Home, Primary  none  -NS     [facility] Name 07/07/20 1338          Cognitive Assessment/Intervention- PT/OT    Orientation Status (Cognition)  oriented x 4  -NS     [facility] Name 07/07/20 1338          Safety Issues, Functional Mobility    Safety Issues Affecting Function (Mobility)  insight into deficits/self awareness;safety precaution awareness;safety precautions follow-through/compliance;sequencing abilities  -NS     Impairments Affecting Function (Mobility)  balance;endurance/activity tolerance;strength;shortness of breath;coordination;motor planning;pain  -NS       User Key  (r) = Recorded By, (t) = Taken By, (c) = Cosigned By    Initials Name Provider Type    Monika Gross, PT Physical Therapist        Mobility     [facility] Name 07/07/20 1334          Bed Mobility Assessment/Treatment    Comment (Bed Mobility)  Patient UIC at start and end of session.  -NS     [facility] Name 07/07/20 1339          Transfer Assessment/Treatment    Comment (Transfers)  VCs for hand placement. Pt with mild unsteadiness upon standing.   -NS     [facility] Name 07/07/20 1331          Sit-Stand Transfer    Sit-Stand [place] (Transfers)  minimum assist (75% patient effort);2 person assist;verbal cues  -NS     Assistive Device (Sit-Stand Transfers)  walker, front-wheeled  -NS     [facility] Name 07/07/20 7741          Gait/Stairs Assessment/Training    Gait/Stairs Assessment/Training  gait/ambulation assistive device  -NS     [place] Level (Gait)  moderate assist (50% patient effort);2 person assist  -NS     Assistive Device (Gait)  walker, front-wheeled  -NS     Distance in Feet (Gait)  5  -NS     Pattern (Gait)  step-to  -NS     Deviations/Abnormal Patterns (Gait)  bilateral deviations;base of support, wide;waylon decreased;gait speed decreased;stride length decreased  -NS     Bilateral Gait Deviations  forward flexed posture;heel strike  decreased;weight shift ability decreased  -NS     Comment (Gait/Stairs)  Patient ambulated at very slow gait speed with RW and Mod A x2 + chair follow. Pt required verbal and tactile cues for weight shifting and advancing BLEs. She demonstrated a wide REGINA and increased fatigue following gait. Chair brought up behind her.   -NS       User Key  (r) = Recorded By, (t) = Taken By, (c) = Cosigned By    Initials Name Provider Type    Monika Gross PT Physical Therapist        Obj/Interventions     Row Name 07/07/20 1338          General ROM    GENERAL ROM COMMENTS  BLEs: WFL  -NS     Row Name 07/07/20 1338          MMT (Manual Muscle Testing)    General MMT Comments  BLEs: grossly 4+/5  -NS     Row Name 07/07/20 1338          Static Sitting Balance    Level of [place] (Unsupported Sitting, Static Balance)  supervision  -NS     Sitting Position (Unsupported Sitting, Static Balance)  sitting in chair  -NS     Time Able to Maintain Position (Unsupported Sitting, Static Balance)  1 to 2 minutes  -NS     Row Name 07/07/20 1338          Dynamic Sitting Balance    Level of [place], Reaches Outside Midline (Sitting, Dynamic Balance)  supervision  -NS     Sitting Position, Reaches Outside Midline (Sitting, Dynamic Balance)  sitting in chair  -NS     Comment, Reaches Outside Midline (Sitting, Dynamic Balance)  MMT  -NS     Row Name 07/07/20 1338          Static Standing Balance    Level of [place] (Supported Standing, Static Balance)  minimal assist, 75% patient effort;2 person assist  -NS     Time Able to Maintain Position (Supported Standing, Static Balance)  15 to 30 seconds  -NS     Assistive Device Utilized (Supported Standing, Static Balance)  walker, rolling  -NS     Row Name 07/07/20 1338          Dynamic Standing Balance    Level of [place], Reaches Outside Midline (Standing, Dynamic Balance)  moderate assist, 50 to 74% patient effort;2 person assist  -NS     Time Able to Maintain Position, Reaches  Outside Midline (Standing, Dynamic Balance)  1 to 2 minutes  -NS     Assistive Device Utilized (Supported Standing, Dynamic Balance)  walker, rolling  -NS     Row Name 07/07/20 1338          Sensory Assessment/Intervention    Sensory General Assessment  -- Pt has peripheral neuropathy in BLEs, with decreased light touch sensation extending up just past B knees.  -NS       User Key  (r) = Recorded By, (t) = Taken By, (c) = Cosigned By    Initials Name Provider Type    Monika Gross PT Physical Therapist        Goals/Plan     Row Name 07/07/20 1332          Bed Mobility Goal 1 (PT)    Activity/Assistive Device (Bed Mobility Goal 1, PT)  sit to supine/supine to sit  -NS     [place] Level/Cues Needed (Bed Mobility Goal 1, PT)  contact guard assist  -NS     Time Frame (Bed Mobility Goal 1, PT)  2 weeks  -NS     [facility] Name 07/07/20 1333          Transfer Goal 1 (PT)    Activity/Assistive Device (Transfer Goal 1, PT)  sit-to-stand/stand-to-sit;bed-to-chair/chair-to-bed;walker, rolling  -NS     [place] Level/Cues Needed (Transfer Goal 1, PT)  contact guard assist  -NS     Time Frame (Transfer Goal 1, PT)  2 weeks  -NS     [facility] Name 07/07/20 9672          Gait Training Goal 1 (PT)    Activity/Assistive Device (Gait Training Goal 1, PT)  gait (walking locomotion);assistive device use;walker, rolling  -NS     [place] Level (Gait Training Goal 1, PT)  contact guard assist  -NS     Distance (Gait Goal 1, PT)  50  -NS     Time Frame (Gait Training Goal 1, PT)  2 weeks  -NS       User Key  (r) = Recorded By, (t) = Taken By, (c) = Cosigned By    Initials Name Provider Type    Monika Gross PT Physical Therapist        Clinical Impression     Row Name 07/07/20 2452          Pain Assessment    Additional Documentation  Pain Scale: Numbers Pre/Post-Treatment (Group)  -NS     [facility] Name 07/07/20 9079          Pain Scale: Numbers Pre/Post-Treatment    Pain Scale: Numbers, Pretreatment  4/10  -NS     Pain Scale:  Numbers, Post-Treatment  4/10  -NS     Pain Location - Side  Left  -NS     Pain Location  groin  -NS     Pain Intervention(s)  Repositioned;Ambulation/increased activity  -NS     Row Name 07/07/20 1220          Plan of Care Review    Plan of Care Reviewed With  patient  -NS     Progress  no change PT eval  -NS     Row Name 07/07/20 1339          Physical Therapy Clinical Impression    Patient/Family Goals Statement (PT Clinical Impression)  To move around more easily.  -NS     Criteria for Skilled Interventions Met (PT Clinical Impression)  yes;treatment indicated  -NS     Rehab Potential (PT Clinical Summary)  good, to achieve stated therapy goals  -NS     Predicted Duration of Therapy (PT)  2 weeks  -NS     Row Name 07/07/20 1338          Vital Signs    Pre Systolic BP Rehab  134  -NS     Pre Treatment Diastolic BP  64  -NS     Post Systolic BP Rehab  140  -NS     Post Treatment Diastolic BP  62  -NS     Pretreatment Heart Rate (beats/min)  80  -NS     Posttreatment Heart Rate (beats/min)  77  -NS     Pre SpO2 (%)  95  -NS     O2 Delivery Pre Treatment  room air  -NS     Post SpO2 (%)  95  -NS     O2 Delivery Post Treatment  room air  -NS     Pre Patient Position  Sitting  -NS     Intra Patient Position  Standing  -NS     Post Patient Position  Sitting  -NS     Row Name 07/07/20 4840          Positioning and Restraints    Pre-Treatment Position  sitting in chair/recliner  -NS     Post Treatment Position  chair  -NS     In Chair  reclined;call light within reach;encouraged to call for assist;exit alarm on;with family/caregiver;waffle cushion;on mechanical lift sling;legs elevated;heels elevated  -NS       User Key  (r) = Recorded By, (t) = Taken By, (c) = Cosigned By    Initials Name Provider Type    Monika Gross, PT Physical Therapist        Outcome Measures     Row Name 07/07/20 1798          How much help from another person do you currently need...    Turning from your back to your side while in flat bed  without using bedrails?  3  -NS     Moving from lying on back to sitting on the side of a flat bed without bedrails?  2  -NS     Moving to and from a bed to a chair (including a wheelchair)?  2  -NS     Standing up from a chair using your arms (e.g., wheelchair, bedside chair)?  3  -NS     Climbing 3-5 steps with a railing?  1  -NS     To walk in hospital room?  2  -NS     AM-PAC 6 Clicks Score (PT)  13  -NS     Row Name 07/07/20 1338          Modified Grimes Scale    Pre-Stroke Modified Grimes Scale  0 - No Symptoms at all.  -NS     Modified [place] Scale  4 - Moderately severe disability.  Unable to walk without assistance, and unable to attend to own bodily needs without assistance.  -NS     Row Name 07/07/20 1338          Functional Assessment    Outcome Measure Options  AM-PAC 6 Clicks Basic Mobility (PT);Modified Raulito  -NS       User Key  (r) = Recorded By, (t) = Taken By, (c) = Cosigned By    Initials Name Provider Type    Monika Gross PT Physical Therapist        Physical Therapy Education                 Title: PT OT SLP Therapies (In Progress)     Topic: Physical Therapy (In Progress)     Point: Mobility training (Done)     Description:   Instruct learner(s) on safety and technique for assisting patient out of bed, chair or wheelchair.  Instruct in the proper use of assistive devices, such as walker, crutches, cane or brace.              Patient Friendly Description:   It's important to get you on your feet again, but we need to do so in a way that is safe for you. Falling has serious consequences, and your personal safety is the most important thing of all.        When it's time to get out of bed, one of us or a family member will sit next to you on the bed to give you support.     If your doctor or nurse tells you to use a walker, crutches, a cane, or a brace, be sure you use it every time you get out of bed, even if you think you don't need it.    Learning Progress Summary           Patient  Acceptance, E, VU,NR by NS at 7/7/2020 1551    Comment:  Patient was educated about PT POC, safety/sequencing with mobility, and rehab benefits.   Family Acceptance, E, VU,NR by NS at 7/7/2020 1551    Comment:  Patient was educated about PT POC, safety/sequencing with mobility, and rehab benefits.                   Point: Home exercise program (Not Started)     Description:   Instruct learner(s) on appropriate technique for monitoring, assisting and/or progressing patient with therapeutic exercises and activities.              Learner Progress:   Not documented in this visit.          Point: Body mechanics (Done)     Description:   Instruct learner(s) on proper positioning and spine alignment for patient and/or caregiver during mobility tasks and/or exercises.              Learning Progress Summary           Patient Acceptance, E, VU,NR by NS at 7/7/2020 1551    Comment:  Patient was educated about PT POC, safety/sequencing with mobility, and rehab benefits.   Family Acceptance, E, VU,NR by NS at 7/7/2020 1551    Comment:  Patient was educated about PT POC, safety/sequencing with mobility, and rehab benefits.                   Point: Precautions (Done)     Description:   Instruct learner(s) on prescribed precautions during mobility and gait tasks              Learning Progress Summary           Patient Acceptance, E, VU,NR by NS at 7/7/2020 1551    Comment:  Patient was educated about PT POC, safety/sequencing with mobility, and rehab benefits.   Family Acceptance, E, VU,NR by NS at 7/7/2020 1551    Comment:  Patient was educated about PT POC, safety/sequencing with mobility, and rehab benefits.                               User Key     Initials Effective Dates Name Provider Type Discipline    NS 09/10/19 -  Monika Martin PT Physical Therapist PT              PT Recommendation and Plan  Planned Therapy Interventions (PT Eval): balance training, bed mobility training, gait training, home exercise program,  neuromuscular re-education, patient/family education, strengthening, transfer training  Outcome Summary/Treatment Plan (PT)  Anticipated Discharge Disposition (PT): skilled nursing facility  Plan of Care Reviewed With: patient  Progress: no change(PT eval)  Outcome Summary: Patient presents with generalized weakness and decreased activity tolerance affecting independence with mobility. She transferred STS with Min Ax 2 and ambulated 5ft with RW and Mod A x2 + chair follow, demonstrating decreased sequencing and weight shifting ability. Distance limited by weakness and fatigue. Skilled IP PT warranted. Recommend SNF at discharge to promote PLOF.     Time Calculation:   PT Charges     Row Name 07/07/20 1338             Time Calculation    Start Time  1338  -NS      PT Received On  07/07/20  -NS      PT Goal Re-Cert Due Date  07/17/20  -NS        User Key  (r) = Recorded By, (t) = Taken By, (c) = Cosigned By    Initials Name Provider Type    Monika Gross, PT Physical Therapist        Therapy Charges for Today     Code Description Service Date Service Provider Modifiers Qty    49208585629 HC PT THER SUPP EA 15 MIN 7/7/2020 Monika Martin, PT GP 2    26025057515 HC PT EVAL MOD COMPLEXITY 4 7/7/2020 Monika Martin, PT GP 1          PT G-Codes  Outcome Measure Options: AM-PAC 6 Clicks Basic Mobility (PT), Modified Raulito  AM-PAC 6 Clicks Score (PT): 13  AM-PAC 6 Clicks Score (OT): 14  Modified [place] Scale: 4 - Moderately severe disability.  Unable to walk without assistance, and unable to attend to own bodily needs without assistance.    Monika Martin PT  7/7/2020         Electronically signed by Monika Martin PT at 07/07/20 2881       Occupational Therapy Notes (last 48 hours) (Notes from 07/06/20 1346 through 07/08/20 1346)    No notes exist for this encounter.

## 2020-07-09 LAB
GLUCOSE BLDC GLUCOMTR-MCNC: 217 MG/DL (ref 70–130)
GLUCOSE BLDC GLUCOMTR-MCNC: 227 MG/DL (ref 70–130)
GLUCOSE BLDC GLUCOMTR-MCNC: 237 MG/DL (ref 70–130)
GLUCOSE BLDC GLUCOMTR-MCNC: 255 MG/DL (ref 70–130)
SARS-COV-2 RNA RESP QL NAA+PROBE: NOT DETECTED

## 2020-07-09 PROCEDURE — 97110 THERAPEUTIC EXERCISES: CPT

## 2020-07-09 PROCEDURE — 97530 THERAPEUTIC ACTIVITIES: CPT

## 2020-07-09 PROCEDURE — 82962 GLUCOSE BLOOD TEST: CPT

## 2020-07-09 PROCEDURE — 99232 SBSQ HOSP IP/OBS MODERATE 35: CPT | Performed by: INTERNAL MEDICINE

## 2020-07-09 PROCEDURE — 63710000001 INSULIN LISPRO (HUMAN) PER 5 UNITS: Performed by: INTERNAL MEDICINE

## 2020-07-09 PROCEDURE — 87635 SARS-COV-2 COVID-19 AMP PRB: CPT | Performed by: INTERNAL MEDICINE

## 2020-07-09 PROCEDURE — 63710000001 INSULIN DETEMIR PER 5 UNITS: Performed by: INTERNAL MEDICINE

## 2020-07-09 PROCEDURE — C9803 HOPD COVID-19 SPEC COLLECT: HCPCS | Performed by: INTERNAL MEDICINE

## 2020-07-09 RX ADMIN — HYDROCODONE BITARTRATE AND ACETAMINOPHEN 1 TABLET: 5; 325 TABLET ORAL at 16:09

## 2020-07-09 RX ADMIN — FLUTICASONE PROPIONATE 2 SPRAY: 50 SPRAY, METERED NASAL at 10:05

## 2020-07-09 RX ADMIN — INSULIN LISPRO 8 UNITS: 100 INJECTION, SOLUTION INTRAVENOUS; SUBCUTANEOUS at 10:04

## 2020-07-09 RX ADMIN — SODIUM CHLORIDE, PRESERVATIVE FREE 10 ML: 5 INJECTION INTRAVENOUS at 10:05

## 2020-07-09 RX ADMIN — CARVEDILOL 12.5 MG: 12.5 TABLET, FILM COATED ORAL at 10:02

## 2020-07-09 RX ADMIN — PREGABALIN 150 MG: 75 CAPSULE ORAL at 22:18

## 2020-07-09 RX ADMIN — HYDROCODONE BITARTRATE AND ACETAMINOPHEN 1 TABLET: 5; 325 TABLET ORAL at 22:18

## 2020-07-09 RX ADMIN — DONEPEZIL HYDROCHLORIDE 10 MG: 10 TABLET, FILM COATED ORAL at 22:18

## 2020-07-09 RX ADMIN — HYDROCODONE BITARTRATE AND ACETAMINOPHEN 1 TABLET: 5; 325 TABLET ORAL at 10:03

## 2020-07-09 RX ADMIN — INSULIN DETEMIR 15 UNITS: 100 INJECTION, SOLUTION SUBCUTANEOUS at 22:19

## 2020-07-09 RX ADMIN — APIXABAN 5 MG: 5 TABLET, FILM COATED ORAL at 05:44

## 2020-07-09 RX ADMIN — AMITRIPTYLINE HYDROCHLORIDE 10 MG: 10 TABLET, FILM COATED ORAL at 22:17

## 2020-07-09 RX ADMIN — CYCLOBENZAPRINE HYDROCHLORIDE 10 MG: 10 TABLET, FILM COATED ORAL at 10:03

## 2020-07-09 RX ADMIN — INSULIN LISPRO 4 UNITS: 100 INJECTION, SOLUTION INTRAVENOUS; SUBCUTANEOUS at 10:02

## 2020-07-09 RX ADMIN — LEVOTHYROXINE SODIUM 50 MCG: 50 TABLET ORAL at 05:44

## 2020-07-09 RX ADMIN — PREGABALIN 150 MG: 75 CAPSULE ORAL at 10:02

## 2020-07-09 RX ADMIN — INSULIN LISPRO 8 UNITS: 100 INJECTION, SOLUTION INTRAVENOUS; SUBCUTANEOUS at 18:06

## 2020-07-09 RX ADMIN — ATORVASTATIN CALCIUM 80 MG: 40 TABLET, FILM COATED ORAL at 22:18

## 2020-07-09 RX ADMIN — CARVEDILOL 12.5 MG: 12.5 TABLET, FILM COATED ORAL at 18:02

## 2020-07-09 RX ADMIN — SODIUM CHLORIDE, PRESERVATIVE FREE 10 ML: 5 INJECTION INTRAVENOUS at 22:19

## 2020-07-09 RX ADMIN — ASPIRIN 81 MG: 81 TABLET, COATED ORAL at 10:03

## 2020-07-09 RX ADMIN — INSULIN LISPRO 3 UNITS: 100 INJECTION, SOLUTION INTRAVENOUS; SUBCUTANEOUS at 18:05

## 2020-07-09 RX ADMIN — CYCLOBENZAPRINE HYDROCHLORIDE 10 MG: 10 TABLET, FILM COATED ORAL at 22:17

## 2020-07-09 RX ADMIN — FAMOTIDINE 40 MG: 20 TABLET, FILM COATED ORAL at 10:03

## 2020-07-09 RX ADMIN — DULOXETINE HYDROCHLORIDE 60 MG: 60 CAPSULE, DELAYED RELEASE ORAL at 22:18

## 2020-07-09 RX ADMIN — ISOSORBIDE MONONITRATE 30 MG: 30 TABLET, EXTENDED RELEASE ORAL at 10:03

## 2020-07-09 RX ADMIN — INSULIN DETEMIR 15 UNITS: 100 INJECTION, SOLUTION SUBCUTANEOUS at 10:06

## 2020-07-09 RX ADMIN — LOSARTAN POTASSIUM 50 MG: 50 TABLET, FILM COATED ORAL at 10:02

## 2020-07-09 RX ADMIN — CASTOR OIL AND BALSAM, PERU: 788; 87 OINTMENT TOPICAL at 10:04

## 2020-07-09 RX ADMIN — INSULIN LISPRO 3 UNITS: 100 INJECTION, SOLUTION INTRAVENOUS; SUBCUTANEOUS at 12:36

## 2020-07-09 RX ADMIN — APIXABAN 5 MG: 5 TABLET, FILM COATED ORAL at 18:02

## 2020-07-09 RX ADMIN — INSULIN LISPRO 8 UNITS: 100 INJECTION, SOLUTION INTRAVENOUS; SUBCUTANEOUS at 12:37

## 2020-07-09 RX ADMIN — FERROUS SULFATE TAB 325 MG (65 MG ELEMENTAL FE) 325 MG: 325 (65 FE) TAB at 10:02

## 2020-07-09 NOTE — PLAN OF CARE
Problem: Patient Care Overview  Goal: Plan of Care Review  Outcome: Ongoing (interventions implemented as appropriate)  Flowsheets (Taken 7/9/2020 1117)  Consent Given to Review Plan with: Pt completed grooming from chair this date with supv set up. Pt completed 2 sets of AROM against gravity ex's followed by inital set of yellow theraband exercises. Continue POC and pt had SNF referrals at this time.

## 2020-07-09 NOTE — DISCHARGE SUMMARY
[facility]  DISCHARGE SUMMARY    Patient Name: Steffen Zeng  : 1940  MRN: [id number]    Date of Admission: 2020 11:33 PM  Date of Discharge: 7/10/2020  Primary Care Physician: Provider, No Known    Consults     Date and Time Order Name Status Description    2020 1135 Inpatient Cardiology Consult Completed     2020 0205 Inpatient Neurology Consult Stroke Completed           Hospital Course     Presenting Problem:   Aspiration pneumonia of both lungs (CMS/[facility]) [J69.0]  Acute hypoxemic respiratory failure (CMS/[facility]) [J96.01]    Active Hospital Problems    Diagnosis  POA   • **Left-sided weakness [R53.1]  Yes   • Acute pulmonary embolism without acute cor pulmonale (CMS/[facility]) [I26.99]  Yes   • History of CVA (cerebrovascular accident) [Z86.73]  Not Applicable   • Hyperlipidemia LDL goal <70 [E78.5]  Yes   • CKD (chronic kidney disease) stage 3, GFR 30-59 ml/min (CMS/[facility]) [N18.3]  Yes   • Demand ischemia (CMS/[facility]) [I24.8]  Yes   • Essential hypertension [I10]  Yes   • Coronary artery disease involving native coronary artery of native heart without angina pectoris [I25.10]  Yes   • Chronic heart failure (CMS/[facility]) [I50.9]  Yes   • Uncontrolled type 2 diabetes mellitus (CMS/[facility]) [E11.65]  Yes   • Acute UTI (urinary tract infection) [N39.0]  Yes   • Acute DVT (deep venous thrombosis) (CMS/[facility]) [I82.409]  Yes   • Acute hypoxemic respiratory failure (CMS/[facility]) [J96.01]  Yes      Resolved Hospital Problems    Diagnosis Date Resolved POA   • NSTEMI, initial episode of care (CMS/[facility]) [I21.4] 2020 Yes      Hospital Course:  Steffen Zeng is a 79 y.o. female with past medical history of CKD stage III, hyperlipidemia type 2 diabetes, hypertension.  Patient presented with left-sided weakness, initially concerning for CVA, however venous duplex did reveal acute left lower extremity DVT.  She is currently on Eliquis.     Acute left lower extremity DVT and PE  CTA chest  shows nonobstructing thrombus in right mid and lower pulmonary arteries suggesting PE, no evidence of RV strain, continue Eliquis     Left-sided weakness, ?TIA  -Suspect this was secondary to above, not CVA , however CT perfusion was positive for ischemic changes in the left occipital lobe, CTA head and neck were unrevealing, MRI head did show small focus of encephalomalacia no evidence of acute infarction.  -Neurology and cards were consulted recommend to continue aspirin in addition to ongoing anticoagulation.    -She will follow-up with PCP in 2 weeks post discharge, continue statin     CKD stage III-renal function seems to be at baseline, continue to monitor, avoid nephrotoxins.     Hypertension-BP currently stable, continue, currently on Coreg, decreased Imdur dose, low-dose losartan     Heart failure with preserved EF  -Currently compensated, continue beta-blocker  -f/u with cardiology in 4 weeks     Poorly controlled type 2 diabetes with A1c 8.2%  -FSBG's were reviewed and were suboptimal but improving  -She was on Levemir 15 units bid, prandial Humalog to 8 units 3 times daily and SSI.  She is okay to resume home regimen at discharge     Suspected UTI-urine cultures NGTD, s/p 3 days of Rocephin.     Hypothyroidism-continue Synthroid     Neuropathy-previously on Tegretol, we will discontinue it moving forward sec to its significant interaction with Eliquis, continue lyrica at reduced dose of 150 mg BID     Right lower extremity, left lateral ankle wounds-WOC following     Chronic severe back pain/spinal stenosis     Anxiety/depression-continue Cymbalta        COVID-19 Status Testing      Asymptomatic COVID testing has been performed on Steffen Zeng to determine status per current protocols in preparation for planned procedure and/or discharge disposition.     The patient does not have concerning clinical findings or high risk screen for COVID and this diagnosis is not clinically suspected.  However, due to  prevalence of asymptomatic COVID infection testing was warranted to facilitate appropriate care.        COVID result:    COVID19   Date Value Ref Range Status   07/09/2020 Not Detected Not Detected - Ref. Range Final                Discharge Follow Up Recommendations for outpatient labs/diagnostics:  Follow-up with PCP in 2 weeks  Follow-up with cardiology in 4 weeks    Day of Discharge     HPI: No acute events overnight,patient states that she slept well,has no new complains.    Review of Systems  Gen- No fevers, chills  CV- No chest pain, palpitations  Resp- No cough, dyspnea  GI- No N/V/D, abd pain    All systems reviewed and currently negative except as mentioned in HPI above    Vital Signs:   Temp:  [97.3 °F (36.3 °C)-98.1 °F (36.7 °C)] 97.3 °F (36.3 °C)  Heart Rate:  [63-76] 63  Resp:  [16-18] 16  BP: (101-149)/(48-67) 143/61     Physical Exam:  Constitutional: Elderly lady, in no acute distress, awake, alert  HENT: NCAT, mucous membranes moist  Respiratory: Clear to auscultation bilaterally, respiratory effort normal   Cardiovascular: RRR, no murmurs, rubs, or gallops, palpable pedal pulses bilaterally  Gastrointestinal: Positive bowel sounds, soft, nontender, nondistended  Musculoskeletal: No bilateral ankle edema  Psychiatric: Appropriate affect, cooperative  Neurologic: Oriented x 3, no focal deficits  Skin: No rashes    Pertinent  and/or Most Recent Results     Results from last 7 days   Lab Units 07/08/20  0709 07/06/20  0903 07/05/20  0513 07/05/20  0055   WBC 10*3/mm3 8.32 9.84 12.87* 15.43*   HEMOGLOBIN g/dL 9.5* 12.1 11.4* 12.3   HEMATOCRIT % 29.5* 36.6 35.4 37.7   PLATELETS 10*3/mm3 155 153 138* 144   SODIUM mmol/L 137 139 138 138   POTASSIUM mmol/L 3.5 3.5 3.2* 3.5   CHLORIDE mmol/L 101 102 101 99   CO2 mmol/L 26.0 25.0 23.0 25.0   BUN mg/dL 19 17 22 23   CREATININE mg/dL 1.02* 0.96 1.10* 1.24*   GLUCOSE mg/dL 259* 294* 284* 334*   CALCIUM mg/dL 9.6 9.1 8.8 8.8     Results from last 7 days   Lab  Units 07/05/20  1045 07/05/20  0513 07/05/20  0055   BILIRUBIN mg/dL  --  0.3 0.3   ALK PHOS U/L  --  75 81   ALT (SGPT) U/L  --  35* 37*   AST (SGOT) U/L  --  60* 61*   PROTIME Seconds 14.1*  --  14.5*   INR  1.12  --  1.16   APTT seconds 31.1*  --  69.7     Results from last 7 days   Lab Units 07/05/20  0055   CHOLESTEROL mg/dL 312*   TRIGLYCERIDES mg/dL 176*   HDL CHOL mg/dL 44     Results from last 7 days   Lab Units 07/05/20  0513 07/05/20  0055   TSH uIU/mL 0.640  --    HEMOGLOBIN A1C %  --  8.20*  8.10*   TROPONIN T ng/mL 0.411* 0.447*   PROCALCITONIN ng/mL  --  7.52*   LACTATE mmol/L 2.4* 2.4*       Brief Urine Lab Results  (Last result in the past 365 days)      Color   Clarity   Blood   Leuk Est   Nitrite   Protein   CREAT   Urine HCG        07/05/20 0723 Yellow Clear Moderate (2+) Moderate (2+) Positive Trace               Microbiology Results Abnormal     Procedure Component Value - Date/Time    COVID PRE-OP / PRE-PROCEDURE SCREENING ORDER (NO ISOLATION) - Swab, Nasopharynx [[id number]] Collected:  07/09/20 1010    Lab Status:  Final result Specimen:  Swab from Nasopharynx Updated:  07/09/20 1453    Narrative:       The following orders were created for panel order COVID PRE-OP / PRE-PROCEDURE SCREENING ORDER (NO ISOLATION) - Swab, Nasopharynx.  Procedure                               Abnormality         Status                     ---------                               -----------         ------                     COVID-19,[facility] IN-HOUSE...[[id number]]  Normal              Final result                 Please view results for these tests on the individual orders.    COVID-19,[facility] IN-HOUSE, NP SWAB IN TRANSPORT MEDIA 8-12 HR TAT - Swab, Nasopharynx [[id number]]  (Normal) Collected:  07/09/20 1010    Lab Status:  Final result Specimen:  Swab from Nasopharynx Updated:  07/09/20 1453     COVID19 Not Detected    Narrative:       Fact sheet for providers: https://www.fda.gov/media/773664/download     Fact  sheet for patients: https://www.fda.gov/media/417871/download    Urine Culture - Urine, Urine, Clean Catch [[id number]]  (Normal) Collected:  07/05/20 0723    Lab Status:  Final result Specimen:  Urine, Clean Catch Updated:  07/06/20 1030     Urine Culture No growth    COVID PRE-OP / PRE-PROCEDURE SCREENING ORDER (NO ISOLATION) - Swab, Nasopharynx [[id number]] Collected:  07/05/20 0137    Lab Status:  Final result Specimen:  Swab from Nasopharynx Updated:  07/05/20 0249    Narrative:       The following orders were created for panel order COVID PRE-OP / PRE-PROCEDURE SCREENING ORDER (NO ISOLATION) - Swab, Nasopharynx.  Procedure                               Abnormality         Status                     ---------                               -----------         ------                     COVID-19,CEPHEID,MYA IN-...[[id number]]  Normal              Final result                 Please view results for these tests on the individual orders.    COVID-19,CEPHEID,MYA IN-HOUSE(OR EMERGENT/ADD-ON),NP SWAB IN TRANSPORT MEDIA 3-4 HR TAT - Swab, Nasopharynx [[id number]]  (Normal) Collected:  07/05/20 0137    Lab Status:  Final result Specimen:  Swab from Nasopharynx Updated:  07/05/20 0249     COVID19 Not Detected    Narrative:       Fact sheet for providers: https://www.fda.gov/media/620601/download     Fact sheet for patients: https://www.fda.gov/media/352669/download          Imaging Results (All)     Procedure Component Value Units Date/Time    CT Angiogram Chest With & Without Contrast [[id number]] Collected:  07/06/20 1451     Updated:  07/07/20 1053    Narrative:       EXAMINATION: CT ANGIOGRAM CHEST W WO CONTRAST-07/06/2020:      INDICATION: DVT; Z74.09-Other reduced mobility; Z78.9-Other specified  health status, chest pain, hypoxia.     TECHNIQUE: Multiple axial CT imaging was obtained of the chest with and  without the administration of intravenous contrast.     The radiation dose reduction device was turned on for each  scan per the  ALARA (As Low as Reasonably Achievable) protocol.     COMPARISON: NONE.     FINDINGS: The thyroid is enlarged and heterogeneous in appearance. Small  lymph nodes seen scattered throughout the mediastinum. Filling defects  seen in the right mid and lower lobe pulmonary arteries suggesting  thrombus and pulmonary emboli. The pulmonary emboli are incompletely  obstructing. RV-LV ratio is within normal limits. There are  bronchiectatic changes and wall thickening identified of the bronchioles  in the left lower lobe. There are mild increased markings suggesting  atelectatic change in the lung bases bilaterally. No dense  consolidation. No definite pleural effusion. Degenerative changes seen  within the spine. The visualized upper abdomen is unremarkable.       Impression:       Nonobstructing thrombus within the right mid and lower lobe  pulmonary arteries suggesting pulmonary emboli. Atelectatic changes seen  in the lung bases with bronchial wall thickening seen in the left lower  lobe bronchioles. The RV-LV ratio is within normal limits.     D:  07/06/2020  E:  07/06/2020           This report was finalized on 7/7/2020 10:50 AM by Dr. Karen Bermudez MD.       MRI Brain Without Contrast [[id number]] Collected:  07/05/20 1346     Updated:  07/05/20 2251    Narrative:       EXAMINATION: MRI BRAIN WO CONTRAST - 07/05/2020     INDICATION: Left-sided weakness. Evaluate for stroke.     TECHNIQUE: Sagittal and axial T1 and axial T2 FLAIR and  diffusion-weighted images of the brain, axial T2 Flash images.     COMPARISON: Head CT scan and cerebral perfusion exam of 07/04/2020     FINDINGS: Perfusion study report indicates approximately 10 mL volume  area of ischemia in the left occipital lobe by rapid analysis. Today's  study shows no evidence of restricted diffusion to suggest acute  infarction the left occipital lobe or elsewhere. Remaining sequences  show a small focus of encephalomalacia in the medial left  temporal lobe  and perhaps trace amount of gliosis in the same location of the right  occipital lobe. There is age-appropriate generalized cerebral atrophy  elsewhere. There is relatively mild nonconfluent central white matter  disease. There are no signal changes suggestive of hemorrhage, no  evidence of mass, mass effect, hydrocephalus, or abnormal extra-axial  collection. Sagittal images show the midline structures to appear  grossly normal. Anatomy of the craniocervical junction appears normal.  There is expected flow signal in the major intracranial arteries and in  the median sagittal sinus. There appears to be chronic opacification  left maxillary sinus. Remaining paranasal sinuses appear grossly clear.       Impression:       1. Small focus of encephalomalacia in the left occipital lobe.. No  evidence of acute infarction here or elsewhere.  2. Chronic-appearing changes of the aging brain. No evidence of acute  intracranial disease.     DICTATED:   07/05/2020  EDITED/ls :   07/05/2020      This report was finalized on 7/5/2020 10:48 PM by Dr. Matt Malave MD.       CT Angiogram Neck [[id number]] Collected:  07/05/20 0017     Updated:  07/05/20 0019    Narrative:       CTA Neck, CTA Head    INDICATION:   Decreased responsiveness. Abnormal perfusion study showing ischemic change in the left occipital lobe    TECHNIQUE:   CT angiogram of the head and neck with IV contrast. 3-D reconstructions were obtained and reviewed.  Evaluation for a significant carotid arterial stenosis is based on the NASCET criteria. Radiation dose reduction techniques included automated exposure  control or exposure modulation based on body size. Count of known CT and cardiac nuc med studies performed in previous 12 months: 0.     COMPARISON:   CT brain and CT perfusion study of the brain from today    FINDINGS:   CTA neck:  Lung apices clear. Thyroid gland is enlarged and has a low-density lesion in the right lobe measuring 2.6 cm in  diameter. Submandibular and parotid glands are normal.    Vascular findings aortic arch is normal in size. Great vessels are all patent. The study slightly degraded by motion. It is difficult to see either vertebral artery in the lower neck. There is reconstitution of the more distal vertebral arteries and the  left one is dominant. 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 There are calcified plaques in each carotid bifurcation region without significant stenosis. The rest of the internal carotid arteries are patent.    CTA head:  The middle and anterior tissue arteries appear normal.. A right posterior cerebral artery appears be present but is a lot of venous opacification. Its difficult to clearly see the left posterior cerebral artery.      Impression:       Study is somewhat limited. There is poor evaluation of both posterior cerebral arteries. Probably the right one is present but is difficult see the proximal left PCA. It is also Also difficult see the proximal vertebral arteries in the neck.    No significant carotid stenosis is identified.    Signer Name: Franklin Bella MD   Signed: 7/5/2020 12:17 AM   Workstation Name: [facility]    [facility]    CT Angiogram Head [[id number]] Collected:  07/05/20 0017     Updated:  07/05/20 0019    Narrative:       CTA Neck, CTA Head    INDICATION:   Decreased responsiveness. Abnormal perfusion study showing ischemic change in the left occipital lobe    TECHNIQUE:   CT angiogram of the head and neck with IV contrast. 3-D reconstructions were obtained and reviewed.  Evaluation for a significant carotid arterial stenosis is based on the NASCET criteria. Radiation dose reduction techniques included automated exposure  control or exposure modulation based on body size. Count of known CT and cardiac nuc med studies performed in previous 12 months: 0.     COMPARISON:   CT brain and CT perfusion study of the brain from today    FINDINGS:   CTA neck:  Lung  apices clear. Thyroid gland is enlarged and has a low-density lesion in the right lobe measuring 2.6 cm in diameter. Submandibular and parotid glands are normal.    Vascular findings aortic arch is normal in size. Great vessels are all patent. The study slightly degraded by motion. It is difficult to see either vertebral artery in the lower neck. There is reconstitution of the more distal vertebral arteries and the  left one is dominant. 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 There are calcified plaques in each carotid bifurcation region without significant stenosis. The rest of the internal carotid arteries are patent.    CTA head:  The middle and anterior tissue arteries appear normal.. A right posterior cerebral artery appears be present but is a lot of venous opacification. Its difficult to clearly see the left posterior cerebral artery.      Impression:       Study is somewhat limited. There is poor evaluation of both posterior cerebral arteries. Probably the right one is present but is difficult see the proximal left PCA. It is also Also difficult see the proximal vertebral arteries in the neck.    No significant carotid stenosis is identified.    Signer Name: Franklin Bella MD   Signed: 7/5/2020 12:17 AM   Workstation Name: [email or web address]    [facility]    CT Cerebral Perfusion With & Without Contrast [[id number]] Collected:  07/05/20 0004     Updated:  07/05/20 0006    Narrative:       CT CEREBRAL PERFUSION W WO CONTRAST    HISTORY:       TECHNIQUE:   Axial CT images of the brain without and with intravenous contrast using cerebral perfusion protocol. Post-processing parametric maps were created using RAPID software and reviewed. Radiation dose reduction techniques included automated exposure control  or exposure modulation based on body size. CT and nuclear cardiology exams in the last 12 months: 0.    COMPARISON:   CT scan earlier today    FINDINGS:   Arterial input function is  optimal.     Ischemic tissue volume (Tmax > 6 seconds, includes core and penumbra): 10 cc in the left occipital lobe  Ischemic core volume (rCBF < 30%): 0  Mismatch (penumbra) volume 10 cc, ratio and then it  Volume of poor collateral perfusion (Tmax > 10 seconds): 0  Hypoperfusion index (Tmax > 10 seconds/Tmax > 6 seconds): 0  CBV index: 0.1      Impression:       There is a 10 cc area of ischemic change in the left occipital lobe without visualized core infarct.    I have given the results to the patient's nurse (Karly) by telephone at 12 4:00 AM          Signer Name: Franklin Bella MD   Signed: 7/5/2020 12:04 AM   Workstation Name: [facility]    Radiology [facility]    CT Abdomen Pelvis Without Contrast [[id number]] Collected:  07/04/20 2356     Updated:  07/04/20 2358    Narrative:       CT Abdomen Pelvis WO    INDICATION:   Abdominal pain. Decreased responsiveness    TECHNIQUE:   CT of the abdomen and pelvis without IV contrast. Coronal and sagittal reconstructions were obtained.  Radiation dose reduction techniques included automated exposure control or exposure modulation based on body size. Count of known CT and cardiac nuc  med studies performed in previous 12 months: 0.     COMPARISON:   None available.    FINDINGS:  Abdomen: The gallbladder is been removed. The liver, spleen, pancreas, adrenal glands and kidneys are normal in appearance. Aorta is normal in size. There is no adenopathy. Bowel is normal except for a few sigmoid diverticuli.    Pelvis: Uterus is been removed. There are no adnexal masses. The bladder is normal except for focal area of benign-appearing calcification so she with posterior wall. This is 12 mm in diameter.      Impression:       Negative CT of the abdomen and pelvis.          Signer Name: Franklin Bella MD   Signed: 7/4/2020 11:56 PM   Workstation Name: [employer]    Radiology [facility]    CT Chest Without Contrast [[id number]] Collected:  07/04/20 3267        Updated:  07/04/20 2355    Narrative:       CT Chest WO    INDICATION:   Shortness of air tonight    TECHNIQUE:   CT of the thorax without IV contrast. Coronal and sagittal reconstructions were obtained.  Radiation dose reduction techniques included automated exposure control or exposure modulation based on body size. Count of known CT and cardiac nuc med studies  performed in previous 12 months: 0.     COMPARISON:   None available.    FINDINGS:  There is minimal basilar atelectasis otherwise the lungs are clear. The airways are patent. The thyroid tissue is enlarged bilaterally. The aorta is normal in size. There is no adenopathy. Bones are unremarkable.      Impression:       Minimal bibasilar atelectasis. Otherwise negative CT chest    Signer Name: Franklin Bella MD   Signed: 7/4/2020 11:53 PM   Workstation Name: [employer]    Radiology [facility]    CT Head Without Contrast Stroke Protocol [[id number]] Collected:  07/04/20 2350     Updated:  07/04/20 2352    Narrative:       CT Head WO Code Stroke    HISTORY:   Decreased responsiveness tonight    TECHNIQUE:   Axial unenhanced head CT. Radiation dose reduction techniques included automated exposure control or exposure modulation based on body size. Count of known CT and cardiac nuc med studies performed in previous 12 months: 0.     Time of scan: 11:40 PM    COMPARISON:   None.    FINDINGS:   No intracranial hemorrhage, mass, or infarct. No hydrocephalus or extra-axial fluid collection. Brain parenchymal density is normal. The skull base, calvarium, and extracranial soft tissues are normal.      Impression:       Normal, negative unenhanced head CT.          Signer Name: Franklin Bella MD   Signed: 7/4/2020 11:50 PM   Workstation Name: [email or web address]    Radiology [facility]          Results for orders placed during the hospital encounter of 07/04/20   Duplex Venous Lower Extremity - Bilateral CAR    Narrative · Normal right lower extremity  venous duplex scan.  · Acute left lower extremity deep vein thrombosis noted in the common   femoral, proximal femoral, mid femoral, distal femoral, popliteal,   posterial tibial, peroneal and gastrocnemius.          Results for orders placed during the hospital encounter of 07/04/20   Duplex Venous Lower Extremity - Bilateral CAR    Narrative · Normal right lower extremity venous duplex scan.  · Acute left lower extremity deep vein thrombosis noted in the common   femoral, proximal femoral, mid femoral, distal femoral, popliteal,   posterial tibial, peroneal and gastrocnemius.          Results for orders placed during the hospital encounter of 07/04/20   Adult Transthoracic Echo Complete W/ Cont if Necessary Per Protocol    Narrative · Cardiac chamber sizes and wall thicknesses are normal. All wall   thicknesses are normal.  · Left ventricular systolic function appears to be at the lower limits of   normal to slightly depressed with an estimated ejection fraction of 51% -   55%.  · The trileaflet aortic valve has some degree of calcific changes.   Significant aortic stenosis/aortic insufficiency is not seen.  · The mitral valve is normal in structure and function.  · There is moderate tricuspid regurgitation with an estimated right   ventricular systolic pressure of 43 mmHg, sugesting moderately severe PA   hypertension.  · An agitated saline study reveals no evidence of intracardiac shunting.  · There are no other important findings on this study.          Plan for Follow-up of Pending Labs/Results: None    Discharge Details        Discharge Medications      New Medications      Instructions Start Date   apixaban 5 MG tablet tablet  Commonly known as:  ELIQUIS   5 mg, Oral, Every 12 Hours      atorvastatin 80 MG tablet  Commonly known as:  LIPITOR   80 mg, Oral, Nightly         Changes to Medications      Instructions Start Date   HYDROcodone-acetaminophen  MG per tablet  Commonly known as:  NORCO  What  changed:  Another medication with the same name was added. Make sure you understand how and when to take each.   1 tablet, Oral, 3 Times Daily      HYDROcodone-acetaminophen 5-325 MG per tablet  Commonly known as:  NORCO  What changed:  You were already taking a medication with the same name, and this prescription was added. Make sure you understand how and when to take each.   1 tablet, Oral, Every 6 Hours PRN      isosorbide mononitrate 30 MG 24 hr tablet  Commonly known as:  IMDUR  What changed:    · medication strength  · how much to take  · when to take this   30 mg, Oral, Every 24 Hours Scheduled   Start Date:  July 11, 2020     losartan 50 MG tablet  Commonly known as:  COZAAR  What changed:  when to take this   50 mg, Oral, Daily      pregabalin 150 MG capsule  Commonly known as:  LYRICA  What changed:    · medication strength  · how much to take   150 mg, Oral, 2 Times Daily         Continue These Medications      Instructions Start Date   amitriptyline 10 MG tablet  Commonly known as:  ELAVIL   10 mg, Oral, Nightly      aspirin 81 MG EC tablet   81 mg, Oral, Daily      carvedilol 12.5 MG tablet  Commonly known as:  COREG   12.5 mg, Oral, 2 Times Daily With Meals      Claritin 10 MG tablet  Generic drug:  loratadine   10 mg, Oral, Daily      cyclobenzaprine 10 MG tablet  Commonly known as:  FLEXERIL   10 mg, Oral, Every Night at Bedtime      diclofenac 1 % gel gel  Commonly known as:  VOLTAREN   4 g, Topical, 3 Times Daily      donepezil 10 MG tablet  Commonly known as:  ARICEPT   10 mg, Oral, Nightly      DULoxetine 60 MG capsule  Commonly known as:  CYMBALTA   60 mg, Oral, Nightly      ferrous sulfate 325 (65 FE) MG tablet   325 mg, Oral, Daily With Breakfast      fish oil 1000 MG capsule capsule   1,000 mg, Oral, Daily With Breakfast      furosemide 40 MG tablet  Commonly known as:  LASIX   40 mg, Oral, Daily      guaiFENesin 600 MG 12 hr tablet  Commonly known as:  MUCINEX   600 mg, Oral, 2 Times  Daily      icosapent ethyl 1 g capsule capsule  Commonly known as:  VASCEPA   1 g, Oral, 2 Times Daily With Meals      ipratropium-albuterol 0.5-2.5 mg/3 ml nebulizer  Commonly known as:  DUO-NEB   3 mL, Nebulization, Every 4 Hours PRN      levothyroxine 50 MCG tablet  Commonly known as:  SYNTHROID, LEVOTHROID   50 mcg, Oral, Daily      linaclotide 290 MCG capsule capsule  Commonly known as:  LINZESS   290 mcg, Oral, Every Morning Before Breakfast      LORazepam 1 MG tablet  Commonly known as:  ATIVAN   1 mg, Oral, Nightly PRN      metFORMIN 500 MG tablet  Commonly known as:  GLUCOPHAGE   500 mg, Oral, 2 Times Daily With Meals      nitroglycerin 0.4 MG SL tablet  Commonly known as:  NITROSTAT   0.4 mg, Sublingual, Every 5 Minutes PRN, Take no more than 3 doses in 15 minutes.       omeprazole 20 MG capsule  Commonly known as:  priLOSEC   20 mg, Oral, Daily      pramipexole 0.5 MG tablet  Commonly known as:  MIRAPEX   0.5 mg, Oral, Every Night at Bedtime      propranolol 40 MG tablet  Commonly known as:  INDERAL   40 mg, Oral, 2 Times Daily      saccharomyces boulardii 250 MG capsule  Commonly known as:  FLORASTOR   250 mg, Oral, Daily      SITagliptin 100 MG tablet  Commonly known as:  JANUVIA   100 mg, Oral, Daily      vitamin D 1.25 MG (99800 UT) capsule capsule  Commonly known as:  ERGOCALCIFEROL   50,000 Units, Oral, Weekly      vitamin E 400 UNIT capsule   400 Units, Oral, Daily         Stop These Medications    bumetanide 2 MG tablet  Commonly known as:  BUMEX     carBAMazepine  MG 12 hr tablet  Commonly known as:  TEGretol  XR            No Known Allergies      Discharge Disposition: Rehab      Diet:  Hospital:  Diet Order   Procedures   • Diet Regular; Cardiac, Consistent Carbohydrate       Activity: As tolerated      Restrictions or Other Recommendations:  None       CODE STATUS:    Code Status and Medical Interventions:   Ordered at: 07/05/20 0203     Limited Support to NOT Include:     Cardioversion/Defibrillation    Intubation     Level Of Support Discussed With:    Next of Kin (If No Surrogate)     Code Status:    No CPR     Medical Interventions (Level of Support Prior to Arrest):    Limited     Comments:    Discussed with daughter at length, patient never wished to be on the ventilator or wanted CPR.       No future appointments.     Electronically signed by Berna Mims MD, 07/10/20, 9:33 AM.    Time Spent on Discharge:  I spent  35  minutes on this discharge activity which included: face-to-face encounter with the patient, reviewing the data in the system, coordination of the care with the nursing staff as well as consultants, documentation, and entering orders.

## 2020-07-09 NOTE — THERAPY TREATMENT NOTE
Acute Care - Occupational Therapy Treatment Note  [facility]     Patient Name: Steffen Zeng  : 1940  MRN: [id number]  Today's Date: 2020             Admit Date: 2020       ICD-10-CM ICD-9-CM   1. Impaired mobility and ADLs Z74.09 V49.89    Z78.9      Patient Active Problem List   Diagnosis   • Acute hypoxemic respiratory failure (CMS/HCC)   • Essential hypertension   • Coronary artery disease involving native coronary artery of native heart without angina pectoris   • Chronic heart failure (CMS/HCC)   • Uncontrolled type 2 diabetes mellitus (CMS/HCC)   • Left-sided weakness   • Acute UTI (urinary tract infection)   • Acute DVT (deep venous thrombosis) (CMS/HCC)   • History of CVA (cerebrovascular accident)   • Hyperlipidemia LDL goal <70   • CKD (chronic kidney disease) stage 3, GFR 30-59 ml/min (CMS/HCC)   • Demand ischemia (CMS/HCC)   • Acute pulmonary embolism without acute cor pulmonale (CMS/HCC)     Past Medical History:   Diagnosis Date   • CHF (congestive heart failure) (CMS/HCC)    • Coronary artery disease    • Diabetes mellitus (CMS/HCC)    • Disease of thyroid gland    • Elevated cholesterol    • Hypertension    • Stroke (CMS/HCC)      Past Surgical History:   Procedure Laterality Date   • APPENDECTOMY     • CARDIAC CATHETERIZATION     •  SECTION     • CHOLECYSTECTOMY     • HYSTERECTOMY     • KNEE ARTHROSCOPY Left        Therapy Treatment    Rehabilitation Treatment Summary     Row Name 20 1117             Treatment Time/Intention    Discipline  occupational therapist  -AN      Document Type  therapy note (daily note)  -AN      Subjective Information  no complaints  -AN      Mode of Treatment  occupational therapy  -AN      Patient/Family Observations  pt reclined in chair  -AN      Care Plan Review  care plan/treatment goals reviewed;risks/benefits reviewed  -AN      Patient Effort  good  -AN      Comment  ptc/o L LE pain  -AN      Existing Precautions/Restrictions  fall   -AN      Recorded by [AN] Verito Alvarado, OT 07/09/20 1307      Row Name 07/09/20 1117             Vital Signs    Pre Systolic BP Rehab  123  -AN      Pre Treatment Diastolic BP  64  -AN      Post Systolic BP Rehab  87  -AN      Post Treatment Diastolic BP  128  -AN      Pretreatment Heart Rate (beats/min)  74  -AN      Recorded by [AN] Verito Alvarado, OT 07/09/20 1307      Row Name 07/09/20 1117             Cognitive Assessment/Intervention- PT/OT    Orientation Status (Cognition)  oriented x 4  -AN      Follows Commands (Cognition)  WFL  -AN      Recorded by [AN] Verito Alvarado, OT 07/09/20 1307      Row Name 07/09/20 1117             Bed Mobility Assessment/Treatment    Comment (Bed Mobility)  pt up in chair  -AN      Recorded by [AN] Verito Alvarado, OT 07/09/20 1307      Row Name 07/09/20 1117             Grooming Assessment/Training    [place] Level (Grooming)  hair care, combing/brushing;wash face, hands;oral care regimen;supervision;set up  -AN      Grooming Position  supported sitting  -AN      Recorded by [AN] Verito Alvarado, OT 07/09/20 1307      Row Name 07/09/20 1117             Motor Skills Assessment/Interventions    Additional Documentation  Therapeutic Exercise (Group)  -AN      Recorded by [AN] Verito Alvarado, OT 07/09/20 1307      Row Name 07/09/20 1117             Therapeutic Exercise    75430 - OT Therapeutic Exercise Minutes  10  -AN      75234 - OT Therapeutic Activity Minutes  13  -AN      Recorded by [AN] Verito Alvarado OT 07/09/20 1307      Row Name 07/09/20 1117             Therapeutic Exercise    Upper Extremity Range of Motion (Therapeutic Exercise)  shoulder flexion/extension, bilateral;shoulder horizontal abduction/adduction, bilateral;shoulder internal/external rotation, bilateral;elbow flexion/extension, bilateral;forearm supination/pronation, bilateral;wrist flexion/extension, bilateral  -AN      Weight/Resistance (Therapeutic Exercise)  yellow  -AN      Sets/Reps (Therapeutic  Exercise)  2 sets of 10 ROM; 1 set with theraband  -AN      Recorded by [AN] Verito Alvarado, OT 07/09/20 1307      Row Name 07/09/20 1117             Positioning and Restraints    Pre-Treatment Position  sitting in chair/recliner  -AN      Post Treatment Position  chair  -AN      In Chair  reclined;call light within reach;encouraged to call for assist;exit alarm on  -AN      Recorded by [AN] Verito Alvarado, OT 07/09/20 1307      Row Name 07/09/20 1117             Pain Scale: Numbers Pre/Post-Treatment    Pain Scale: Numbers, Pretreatment  3/10  -AN      Pain Scale: Numbers, Post-Treatment  3/10  -AN      Pain Location - Side  Left  -AN      Pain Location - Orientation  lower  -AN      Pain Location  extremity  -AN      Pain Intervention(s)  Rest  -AN      Recorded by [AN] Verito Alvarado, OT 07/09/20 1307      Row Name                Wound 07/05/20 0058 Left lower;anterior ankle Pressure Injury    Wound - Properties Group Date first assessed: 07/05/20 [MP] Time first assessed: 0058 [MP] Side: Left [MP] Orientation: lower;anterior [MP] Location: ankle [MP] Primary Wound Type: Pressure inj [MP] Stage, Pressure Injury: Stage 2 [MP] Recorded by:  [MP] rBandt Liang RN 07/05/20 0417    Row Name                Wound 07/05/20 0058 Right anterior;lower leg Skin Tear    Wound - Properties Group Date first assessed: 07/05/20 [MP] Time first assessed: 0058 [MP] Side: Right [MP] Orientation: anterior;lower [MP] Location: leg [MP] Primary Wound Type: Skin tear [MP] Stage, Pressure Injury: unstageable [MP] Additional Comments: -- [MR], venous stasis ulcer  Recorded by:  [MP] Brandt Liang RN 07/05/20 0417 [MR] Yudi Frances RN 07/06/20 1401    Row Name                Wound 07/05/20 0058 Right medial gluteal Pressure Injury    Wound - Properties Group Date first assessed: 07/05/20 [MP] Time first assessed: 0058 [MP] Side: Right [MP] Orientation: medial [MP] Location: gluteal [MP] Primary Wound Type: Pressure inj  [MP] Stage, Pressure Injury: Stage 2 [MP] Recorded by:  [MP] Brandt Liang, RN 07/05/20 0418    Row Name 07/09/20 1117             Plan of Care Review    Plan of Care Reviewed With  patient  -AN      Recorded by [AN] Verito Alvarado, OT 07/09/20 1307      Row Name 07/09/20 1117             Outcome Summary/Treatment Plan (OT)    Daily Summary of Progress (OT)  progress toward functional goals is good  -AN      Anticipated Discharge Disposition (OT)  skilled nursing facility  -AN      Recorded by [AN] Verito Alvarado, OT 07/09/20 1307        User Key  (r) = Recorded By, (t) = Taken By, (c) = Cosigned By    Initials Name Effective Dates Discipline    AN Verito Alvarado, OT 06/22/15 -  OT    Yudi Escudero, RN 06/16/16 -  Nurse    Brandt Kennedy RN 11/20/19 -  Nurse        Wound 07/05/20 0058 Left lower;anterior ankle Pressure Injury (Active)   Dressing Appearance intact 7/8/2020  8:00 PM   Closure JACOB 7/9/2020 10:02 AM   Base dressing in place, unable to visualize 7/9/2020 10:02 AM   Periwound intact;edematous;dry 7/8/2020  6:30 PM   Periwound Temperature warm 7/8/2020  6:30 PM   Periwound Skin Turgor soft 7/8/2020  6:30 PM   Edges open;irregular 7/8/2020  6:30 PM   Drainage Amount none 7/9/2020 10:02 AM   Care, Wound cleansed with;sterile normal saline 7/8/2020  6:30 PM   Dressing Care, Wound foam;low-adherent 7/9/2020 10:02 AM   Periwound Care, Wound dry periwound area maintained 7/8/2020  6:30 PM       Wound 07/05/20 0058 Right anterior;lower leg Skin Tear (Active)   Dressing Appearance intact 7/8/2020  8:00 PM   Closure JACOB 7/9/2020 10:02 AM   Base dressing in place, unable to visualize 7/9/2020 10:02 AM   Periwound blanchable;pink 7/8/2020  8:00 PM   Periwound Temperature warm 7/8/2020  6:30 PM   Periwound Skin Turgor soft 7/8/2020  6:30 PM   Edges irregular 7/8/2020  6:30 PM   Drainage Characteristics/Odor serous 7/8/2020  6:30 PM   Drainage Amount scant 7/8/2020  6:30 PM   Care, Wound cleansed  with;sterile normal saline 7/8/2020  6:30 PM   Dressing Care, Wound foam;low-adherent 7/9/2020 10:02 AM   Periwound Care, Wound dry periwound area maintained 7/8/2020  6:30 PM       Wound 07/05/20 0058 Right medial gluteal Pressure Injury (Active)   Dressing Appearance open to air 7/9/2020 10:02 AM   Closure Open to air 7/9/2020 10:02 AM   Base blanchable;purple 7/9/2020 10:02 AM   Periwound intact;dry;warm 7/8/2020  8:00 PM   Periwound Skin Turgor soft 7/8/2020  8:00 PM   Edges irregular 7/8/2020  8:00 PM   Drainage Amount none 7/8/2020  8:00 PM   Care, Wound barrier applied;other (see comments) 7/9/2020 10:02 AM       Occupational Therapy Education                 Title: PT OT SLP Therapies (In Progress)     Topic: Occupational Therapy (In Progress)     Point: ADL training (Done)     Description:   Instruct learner(s) on proper safety adaptation and remediation techniques during self care or transfers.   Instruct in proper use of assistive devices.              Learning Progress Summary           Patient Acceptance, E,D, VU,DU,NR by AN at 7/9/2020 1119    Comment:  UE HEP, ADL retraining    Acceptance, E,D, VU,NR by JAGDEEP at 7/6/2020 1012    Comment:  reason for consult, evaluation results, goal setting, UE TE, transfer safety   Family Acceptance, E,D, VU,NR by JAGDEEP at 7/6/2020 1012    Comment:  reason for consult, evaluation results, goal setting, UE TE, transfer safety                   Point: Home exercise program (Done)     Description:   Instruct learner(s) on appropriate technique for monitoring, assisting and/or progressing therapeutic exercises/activities.              Learning Progress Summary           Patient Acceptance, E,D, VU,DU,NR by AN at 7/9/2020 1119    Comment:  UE HEP, ADL retraining    Acceptance, E,D, VU,NR by JAGDEEP at 7/6/2020 1012    Comment:  reason for consult, evaluation results, goal setting, UE TE, transfer safety   Family Acceptance, E,D, VU,NR by JAGDEEP at 7/6/2020 1012    Comment:  reason for  consult, evaluation results, goal setting, UE TE, transfer safety                   Point: Precautions (Done)     Description:   Instruct learner(s) on prescribed precautions during self-care and functional transfers.              Learning Progress Summary           Patient Acceptance, E,D, VU,NR by JAGDEEP at 7/6/2020 1012    Comment:  reason for consult, evaluation results, goal setting, UE TE, transfer safety   Family Acceptance, E,D, VU,NR by JAGDEEP at 7/6/2020 1012    Comment:  reason for consult, evaluation results, goal setting, UE TE, transfer safety                   Point: Body mechanics (Not Started)     Description:   Instruct learner(s) on proper positioning and spine alignment during self-care, functional mobility activities and/or exercises.              Learner Progress:   Not documented in this visit.                      User Key     Initials Effective Dates Name Provider Type Discipline    JAGDEEP 06/08/18 -  Bella Campbell, OT Occupational Therapist OT    LUCIANA 06/22/15 -  Verito Alvarado, OT Occupational Therapist OT                OT Recommendation and Plan  Outcome Summary/Treatment Plan (OT)  Daily Summary of Progress (OT): progress toward functional goals is good  Anticipated Discharge Disposition (OT): skilled nursing facility  Daily Summary of Progress (OT): progress toward functional goals is good  Plan of Care Review  Plan of Care Reviewed With: patient  Plan of Care Reviewed With: patient  Outcome Measures     Row Name 07/09/20 1119             How much help from another is currently needed...    Putting on and taking off regular lower body clothing?  2  -AN      Bathing (including washing, rinsing, and drying)  2  -AN      Toileting (which includes using toilet bed pan or urinal)  2  -AN      Putting on and taking off regular upper body clothing  3  -AN      Taking care of personal grooming (such as brushing teeth)  3  -AN      Eating meals  4  -AN      AM-PAC 6 Clicks Score (OT)  16  -AN          Modified [place] Scale    Modified [place] Scale  4 - Moderately severe disability.  Unable to walk without assistance, and unable to attend to own bodily needs without assistance.  -AN        User Key  (r) = Recorded By, (t) = Taken By, (c) = Cosigned By    Initials Name Provider Type    Verito Valerio OT Occupational Therapist           Time Calculation:   Time Calculation- OT     Row Name 07/09/20 1310 07/09/20 1117          Time Calculation- OT    OT Start Time  1119  -AN  --     Total Timed Code Minutes- OT  23 minute(s)  -AN  --     OT Received On  07/09/20  -LUICANA  --     OT Goal Re-Cert Due Date  07/09/20  -LUCIANA  --        Timed Charges    33093 - OT Therapeutic Exercise Minutes  --  10  -AN     17259 - OT Therapeutic Activity Minutes  --  13  -AN       User Key  (r) = Recorded By, (t) = Taken By, (c) = Cosigned By    Initials Name Provider Type    Verito Valerio OT Occupational Therapist        Therapy Charges for Today     Code Description Service Date Service Provider Modifiers Qty    28302443728 HC OT THER PROC EA 15 MIN 7/9/2020 Verito Alvarado OT GO 1    20165170596 HC OT THERAPEUTIC ACT EA 15 MIN 7/9/2020 Verito Alvarado OT GO 1               Verito Alvarado OT  7/9/2020

## 2020-07-09 NOTE — THERAPY TREATMENT NOTE
Patient Name: Steffen Zeng  : 1940    MRN: [id number]                              Today's Date: 2020       Admit Date: 2020    Visit Dx:     ICD-10-CM ICD-9-CM   1. Impaired mobility and ADLs Z74.09 V49.89    Z78.9      Patient Active Problem List   Diagnosis   • Acute hypoxemic respiratory failure (CMS/HCC)   • Essential hypertension   • Coronary artery disease involving native coronary artery of native heart without angina pectoris   • Chronic heart failure (CMS/HCC)   • Uncontrolled type 2 diabetes mellitus (CMS/HCC)   • Left-sided weakness   • Acute UTI (urinary tract infection)   • Acute DVT (deep venous thrombosis) (CMS/HCC)   • History of CVA (cerebrovascular accident)   • Hyperlipidemia LDL goal <70   • CKD (chronic kidney disease) stage 3, GFR 30-59 ml/min (CMS/HCC)   • Demand ischemia (CMS/HCC)   • Acute pulmonary embolism without acute cor pulmonale (CMS/HCC)     Past Medical History:   Diagnosis Date   • CHF (congestive heart failure) (CMS/[facility])    • Coronary artery disease    • Diabetes mellitus (CMS/HCC)    • Disease of thyroid gland    • Elevated cholesterol    • Hypertension    • Stroke (CMS/HCC)      Past Surgical History:   Procedure Laterality Date   • APPENDECTOMY     • CARDIAC CATHETERIZATION     •  SECTION     • CHOLECYSTECTOMY     • HYSTERECTOMY     • KNEE ARTHROSCOPY Left      General Information     Row Name 20 1416          PT Evaluation Time/Intention    Document Type  therapy note (daily note)  -CD     Mode of Treatment  physical therapy  -CD     Row Name 20 1416          General Information    Patient Profile Reviewed?  yes  -CD     Existing Precautions/Restrictions  fall  -CD     Barriers to Rehab  previous functional deficit  -CD     Row Name 20 1416          Cognitive Assessment/Intervention- PT/OT    Orientation Status (Cognition)  oriented x 4  -CD     Cognitive Assessment/Intervention Comment  PT FOLLOWS ALL COMMANDS.   -CD     Row Name  07/09/20 1416          Safety Issues, Functional Mobility    Safety Issues Affecting Function (Mobility)  insight into deficits/self awareness;safety precautions follow-through/compliance;safety precaution awareness  -CD     Impairments Affecting Function (Mobility)  balance;endurance/activity tolerance;strength;pain  -CD     Comment, Safety Issues/Impairments (Mobility)  C/O NEW L LE PAIN AND CHRONIC BACK PAIN.   -CD       User Key  (r) = Recorded By, (t) = Taken By, (c) = Cosigned By    Initials Name Provider Type    Bia Almendarez PT Physical Therapist        Mobility     Row Name 07/09/20 1417          Bed Mobility Assessment/Treatment    Comment (Bed Mobility)  PT [facility].   -CD     Row Name 07/09/20 1417          Transfer Assessment/Treatment    Comment (Transfers)  CUES FOR HAND PLACEMENT. PERFORMED STS X 5 REPS FROM RECLINER. CUES FOR ANTERIOR ROCKING FORWARD AND HAND PLACEMENT. IMPROVED WITH MULTIPLE REPS WITH LESS ASSISTANCE REQUIRED.   -CD     Row Name 07/09/20 1417          Sit-Stand Transfer    Sit-Stand [place] (Transfers)  minimum assist (75% patient effort);verbal cues PROGRESSED TO CGA.   -CD     Assistive Device (Sit-Stand Transfers)  walker, front-wheeled  -CD     Row Name 07/09/20 1417          Gait/Stairs Assessment/Training    Comment (Gait/Stairs)  DEFERRED GAIT TO FOCUS ON STANDING TOLERANCE AND STS TRANSFERS.   -CD       User Key  (r) = Recorded By, (t) = Taken By, (c) = Cosigned By    Initials Name Provider Type    Bia Almendarez PT Physical Therapist        Obj/Interventions     Row Name 07/09/20 1419          Therapeutic Exercise    Lower Extremity (Therapeutic Exercise)  marching while seated;LAQ (long arc quad), bilateral;SLR (straight leg raise), bilateral  -CD     Lower Extremity Range of Motion (Therapeutic Exercise)  ankle dorsiflexion/plantar flexion, bilateral  -CD     Exercise Type (Therapeutic Exercise)  AROM (active range of motion)  -CD     Position (Therapeutic  Exercise)  seated  -CD     Sets/Reps (Therapeutic Exercise)  1 SET OF 10 REPS. SLR X 5 REPS EACH.  -CD     Comment (Therapeutic Exercise)  ENCOURAGED TO PERFORM LE THER EX AGAIN LATER TODAY.   -CD     Row Name 07/09/20 1419          Static Sitting Balance    Level of [place] (Unsupported Sitting, Static Balance)  independent  -CD     Sitting Position (Unsupported Sitting, Static Balance)  sitting in chair EDGE  -CD     Time Able to Maintain Position (Unsupported Sitting, Static Balance)  more than 5 minutes  -CD     Row Name 07/09/20 1419          Dynamic Sitting Balance    Level of [place], Reaches Outside Midline (Sitting, Dynamic Balance)  supervision  -CD     Sitting Position, Reaches Outside Midline (Sitting, Dynamic Balance)  sitting in chair  -CD     Comment, Reaches Outside Midline (Sitting, Dynamic Balance)  RECIPROCAL SCOOTING AND LE THER EX.   -CD     Row Name 07/09/20 1419          Static Standing Balance    Level of [place] (Supported Standing, Static Balance)  contact guard assist  -CD     Assistive Device Utilized (Supported Standing, Static Balance)  walker, rolling  -CD     Comment (Supported Standing, Static Balance)  PT BECOMES TREMULOUS AS FATIGUES. DENIES DIZZINESS. MILD SOA.   -CD     Row Name 07/09/20 1419          Dynamic Standing Balance    Level of [place], Reaches Outside Midline (Standing, Dynamic Balance)  minimal assist, 75% patient effort  -CD     Assistive Device Utilized (Supported Standing, Dynamic Balance)  walker, rolling  -CD     Comment, Reaches Outside Midline (Standing, Dynamic Balance)  WT SHIFTING R/L.   -CD       User Key  (r) = Recorded By, (t) = Taken By, (c) = Cosigned By    Initials Name Provider Type    CD Bia Schreiber PT Physical Therapist        Goals/Plan    No documentation.       Clinical Impression     Row Name 07/09/20 1421          Pain Scale: Numbers Pre/Post-Treatment    Pain Scale: Numbers, Pretreatment  5/10  -CD     Pain Scale:  Numbers, Post-Treatment  5/10  -CD     Pain Location - Side  Left  -CD     Pain Location - Orientation  lower  -CD     Pre/Post Treatment Pain Comment  L LE 5/10 AND CHRONIC LBP 4/10   -CD     Pain Intervention(s)  Ambulation/increased activity;Rest  -CD     Row Name 07/09/20 1421          Plan of Care Review    Plan of Care Reviewed With  patient  -CD     Outcome Summary  PT GIVES GOOD EFFORT AND FOLLOWS ALL COMMANDS. NEEDS CUES FOR SAFETY WITH TRANSFERS. LIMITED BY DECREASED ENDURANCE AND PAIN. PERFORMED STS X 5 REPS WITH CGA TO MIN ASSIST AND LE SITTING THER EX. RECOMMEND SNF AT D/C FOR RETURN TO PLOF BEFORE HOME ALONE.   -CD     Row Name 07/09/20 1421          Physical Therapy Clinical Impression    Patient/Family Goals Statement (PT Clinical Impression)  AGREES TO GO TO SNF FOR REHAB.   -CD     Criteria for Skilled Interventions Met (PT Clinical Impression)  yes  -CD     Rehab Potential (PT Clinical Summary)  good, to achieve stated therapy goals  -CD     Row Name 07/09/20 1421          Vital Signs    Post Systolic BP Rehab  139  -CD     Post Treatment Diastolic BP  68  -CD     Posttreatment Heart Rate (beats/min)  73  -CD     Pre SpO2 (%)  95  -CD     O2 Delivery Pre Treatment  room air  -CD     O2 Delivery Intra Treatment  room air  -CD     Post SpO2 (%)  95  -CD     O2 Delivery Post Treatment  room air  -CD     Pre Patient Position  Supine  -CD     Intra Patient Position  Standing  -CD     Post Patient Position  Sitting  -CD     Row Name 07/09/20 1421          Positioning and Restraints    Pre-Treatment Position  sitting in chair/recliner  -CD     Post Treatment Position  chair  -CD     In Chair  reclined;call light within reach;encouraged to call for assist;legs elevated;notified nsg NSG DECLINED NEED FOR EXIT ALARM AS PT CALLS OUT.   -CD       User Key  (r) = Recorded By, (t) = Taken By, (c) = Cosigned By    Initials Name Provider Type    Bia Almendarez, PT Physical Therapist        Outcome Measures      Row Name 07/09/20 1426          How much help from another person do you currently need...    Turning from your back to your side while in flat bed without using bedrails?  3  -CD     Moving from lying on back to sitting on the side of a flat bed without bedrails?  2  -CD     Moving to and from a bed to a chair (including a wheelchair)?  2  -CD     Standing up from a chair using your arms (e.g., wheelchair, bedside chair)?  3  -CD     Climbing 3-5 steps with a railing?  1  -CD     To walk in hospital room?  2  -CD     AM-PAC 6 Clicks Score (PT)  13  -CD     Row Name 07/09/20 1426          Modified [place] Scale    Modified [place] Scale  4 - Moderately severe disability.  Unable to walk without assistance, and unable to attend to own bodily needs without assistance.  -CD       User Key  (r) = Recorded By, (t) = Taken By, (c) = Cosigned By    Initials Name Provider Type    CD Bia Schreiber PT Physical Therapist        Physical Therapy Education                 Title: PT OT SLP Therapies (In Progress)     Topic: Physical Therapy (Done)     Point: Mobility training (Done)     Description:   Instruct learner(s) on safety and technique for assisting patient out of bed, chair or wheelchair.  Instruct in the proper use of assistive devices, such as walker, crutches, cane or brace.              Patient Friendly Description:   It's important to get you on your feet again, but we need to do so in a way that is safe for you. Falling has serious consequences, and your personal safety is the most important thing of all.        When it's time to get out of bed, one of us or a family member will sit next to you on the bed to give you support.     If your doctor or nurse tells you to use a walker, crutches, a cane, or a brace, be sure you use it every time you get out of bed, even if you think you don't need it.    Learning Progress Summary           Patient ANY Ulrich VU by CD at 7/9/2020 1426    Comment:  SEE FLOWSHEET.     Acceptance, E, VU,NR by NS at 7/7/2020 1551    Comment:  Patient was educated about PT POC, safety/sequencing with mobility, and rehab benefits.   Family Acceptance, E, VU,NR by NS at 7/7/2020 1551    Comment:  Patient was educated about PT POC, safety/sequencing with mobility, and rehab benefits.                   Point: Home exercise program (Done)     Description:   Instruct learner(s) on appropriate technique for monitoring, assisting and/or progressing patient with therapeutic exercises and activities.              Learning Progress Summary           Patient Acceptance, E, VU by CD at 7/9/2020 1426    Comment:  SEE FLOWSHEET.                   Point: Body mechanics (Done)     Description:   Instruct learner(s) on proper positioning and spine alignment for patient and/or caregiver during mobility tasks and/or exercises.              Learning Progress Summary           Patient Acceptance, E, VU by CD at 7/9/2020 1426    Comment:  SEE FLOWSHEET.    Acceptance, E, VU,NR by NS at 7/7/2020 1551    Comment:  Patient was educated about PT POC, safety/sequencing with mobility, and rehab benefits.   Family Acceptance, E, VU,NR by NS at 7/7/2020 1551    Comment:  Patient was educated about PT POC, safety/sequencing with mobility, and rehab benefits.                   Point: Precautions (Done)     Description:   Instruct learner(s) on prescribed precautions during mobility and gait tasks              Learning Progress Summary           Patient Acceptance, E, VU by CD at 7/9/2020 1426    Comment:  SEE FLOWSHEET.    Acceptance, E, VU,NR by NS at 7/7/2020 1551    Comment:  Patient was educated about PT POC, safety/sequencing with mobility, and rehab benefits.   Family Acceptance, E, VU,NR by NS at 7/7/2020 1551    Comment:  Patient was educated about PT POC, safety/sequencing with mobility, and rehab benefits.                               User Key     Initials Effective Dates Name Provider Type Discipline    CD 06/19/15 -   Bia Schreiber PT Physical Therapist PT    NS 09/10/19 -  Monika Martin PT Physical Therapist PT              PT Recommendation and Plan     Outcome Summary/Treatment Plan (PT)  Anticipated Discharge Disposition (PT): skilled nursing facility  Plan of Care Reviewed With: patient  Outcome Summary: PT GIVES GOOD EFFORT AND FOLLOWS ALL COMMANDS. NEEDS CUES FOR SAFETY WITH TRANSFERS. LIMITED BY DECREASED ENDURANCE AND PAIN. PERFORMED STS X 5 REPS WITH CGA TO MIN ASSIST AND LE SITTING THER EX. RECOMMEND SNF AT D/C FOR RETURN TO PLOF BEFORE HOME ALONE.      Time Calculation:   PT Charges     Row Name 07/09/20 1427             Time Calculation    Start Time  1345  -CD      PT Received On  07/09/20  -CD      PT Goal Re-Cert Due Date  07/17/20  -CD         Time Calculation- PT    Total Timed Code Minutes- PT  30 minute(s)  -CD         Timed Charges    34057 - PT Therapeutic Exercise Minutes  15  -CD      86609 - PT Therapeutic Activity Minutes  15  -CD        User Key  (r) = Recorded By, (t) = Taken By, (c) = Cosigned By    Initials Name Provider Type    CD Bia Schreiber, PT Physical Therapist        Therapy Charges for Today     Code Description Service Date Service Provider Modifiers Qty    34468842193 HC PT THER PROC EA 15 MIN 7/9/2020 Bia Schreiber, PT GP 1    39608162580 HC PT THERAPEUTIC ACT EA 15 MIN 7/9/2020 Bia Schreiber, PT GP 1          PT G-Codes  Outcome Measure Options: AM-PAC 6 Clicks Basic Mobility (PT), Modified [place]  AM-PAC 6 Clicks Score (PT): 13  AM-PAC 6 Clicks Score (OT): 16  Modified [place] Scale: 4 - Moderately severe disability.  Unable to walk without assistance, and unable to attend to own bodily needs without assistance.    Bia Schreiber, PT  7/9/2020

## 2020-07-09 NOTE — PROGRESS NOTES
Continued Stay Note  [facility]     Patient Name: Steffen Zeng  MRN: [id number]  Today's Date: 7/9/2020    Admit Date: 7/4/2020    Discharge Plan     Row Name 07/09/20 0907       Plan    Plan  [facility]    Patient/Family in Agreement with Plan  yes    Plan Comments  Spoke with Jade at [facility] and they will have a bed for the patient tomorrow. Patient will need a new COVID test before tranfer. Dr Mims notified. Spoke with Cher (daughter) and she will transport and plans to be here around noon of Friday. CM will continue to follow.    Final Discharge Disposition Code  03 - skilled nursing facility (SNF)        Discharge Codes    No documentation.       Expected Discharge Date and Time     Expected Discharge Date Expected Discharge Time    Jul 9, 2020             Kota Mayers RN

## 2020-07-09 NOTE — PROGRESS NOTES
[facility] Medicine Services  PROGRESS NOTE    Patient Name: Steffen Zeng  : 1940  MRN: [id number]    Date of Admission: 2020  Primary Care Physician: Provider, No Known    Subjective   Subjective     CC: Follow-up LLE DVT    HPI:No acute events overnight, patient states that she is doing ok,has no new complaints.     Review of Systems  Gen- No fevers, chills  CV- No chest pain, palpitations  Resp- No cough, dyspnea  GI- No N/V/D, abd pain     All systems reviewed currently negative except as mentioned in HPI above    Objective   Objective     Vital Signs:   Temp:  [97.4 °F (36.3 °C)-98.1 °F (36.7 °C)] 97.4 °F (36.3 °C)  Heart Rate:  [62-92] 65  Resp:  [16-18] 18  BP: (105-126)/(52-68) 123/64  Total (NIH Stroke Scale): 1     Physical Exam:  Constitutional: No acute distress, awake, alert  HENT: NCAT, mucous membranes moist  Respiratory: Clear to auscultation bilaterally, respiratory effort normal   Cardiovascular: RRR, no murmurs, rubs, or gallops, palpable pedal pulses bilaterally  Gastrointestinal: Positive bowel sounds, soft, nontender, nondistended  Musculoskeletal: No bilateral ankle edema  Psychiatric: Appropriate affect, cooperative  Neurologic: Oriented x 3, no focal deficits  Skin: No rashes    Results Reviewed:  Results from last 7 days   Lab Units 20  0709 20  0903 20  1045 20  0513 20  0055   WBC 10*3/mm3 8.32 9.84  --  12.87* 15.43*   HEMOGLOBIN g/dL 9.5* 12.1  --  11.4* 12.3   HEMATOCRIT % 29.5* 36.6  --  35.4 37.7   PLATELETS 10*3/mm3 155 153  --  138* 144   INR   --   --  1.12  --  1.16   PROCALCITONIN ng/mL  --   --   --   --  7.52*     Results from last 7 days   Lab Units 20  0709 20  0903 20  0513 20  0055   SODIUM mmol/L 137 139 138 138   POTASSIUM mmol/L 3.5 3.5 3.2* 3.5   CHLORIDE mmol/L 101 102 101 99   CO2 mmol/L 26.0 25.0 23.0 25.0   BUN mg/dL 19 17 22 23   CREATININE mg/dL 1.02* 0.96 1.10* 1.24*      GLUCOSE mg/dL 259* 294* 284* 334*   CALCIUM mg/dL 9.6 9.1 8.8 8.8   ALT (SGPT) U/L  --   --  35* 37*   AST (SGOT) U/L  --   --  60* 61*   TROPONIN T ng/mL  --   --  0.411* 0.447*     Estimated Creatinine Clearance: 52.4 mL/min (A) (by C-G formula based on SCr of 1.02 mg/dL (H)).    Microbiology Results Abnormal     Procedure Component Value - Date/Time    Urine Culture - Urine, Urine, Clean Catch [[id number]]  (Normal) Collected:  07/05/20 0723    Lab Status:  Final result Specimen:  Urine, Clean Catch Updated:  07/06/20 1030     Urine Culture No growth    COVID PRE-OP / PRE-PROCEDURE SCREENING ORDER (NO ISOLATION) - Swab, Nasopharynx [[id number]] Collected:  07/05/20 0137    Lab Status:  Final result Specimen:  Swab from Nasopharynx Updated:  07/05/20 0249    Narrative:       The following orders were created for panel order COVID PRE-OP / PRE-PROCEDURE SCREENING ORDER (NO ISOLATION) - Swab, Nasopharynx.  Procedure                               Abnormality         Status                     ---------                               -----------         ------                     COVID-19,CEPHEID,MYA IN-...[[id number]]  Normal              Final result                 Please view results for these tests on the individual orders.    COVID-19,CEPHEID,MYA IN-HOUSE(OR EMERGENT/ADD-ON),NP SWAB IN TRANSPORT MEDIA 3-4 HR TAT - Swab, Nasopharynx [[id number]]  (Normal) Collected:  07/05/20 0137    Lab Status:  Final result Specimen:  Swab from Nasopharynx Updated:  07/05/20 0249     COVID19 Not Detected    Narrative:       Fact sheet for providers: https://www.fda.gov/media/046122/download     Fact sheet for patients: https://www.fda.gov/media/895990/download          Imaging Results (Last 24 Hours)     ** No results found for the last 24 hours. **          Results for orders placed during the hospital encounter of 07/04/20   Adult Transthoracic Echo Complete W/ Cont if Necessary Per Protocol    Narrative · Cardiac chamber sizes  and wall thicknesses are normal. All wall   thicknesses are normal.  · Left ventricular systolic function appears to be at the lower limits of   normal to slightly depressed with an estimated ejection fraction of 51% -   55%.  · The trileaflet aortic valve has some degree of calcific changes.   Significant aortic stenosis/aortic insufficiency is not seen.  · The mitral valve is normal in structure and function.  · There is moderate tricuspid regurgitation with an estimated right   ventricular systolic pressure of 43 mmHg, sugesting moderately severe PA   hypertension.  · An agitated saline study reveals no evidence of intracardiac shunting.  · There are no other important findings on this study.          I have reviewed the medications:  Scheduled Meds:    amitriptyline 10 mg Oral Nightly   apixaban 5 mg Oral Q12H   aspirin 81 mg Oral Daily   atorvastatin 80 mg Oral Nightly   carvedilol 12.5 mg Oral BID With Meals   castor oil-balsam peru  Topical Q12H   donepezil 10 mg Oral Nightly   DULoxetine 60 mg Oral Nightly   famotidine 40 mg Oral Daily   ferrous sulfate 325 mg Oral Daily With Breakfast   fluticasone 2 spray Each Nare Daily   insulin detemir 15 Units Subcutaneous Q12H   insulin lispro 0-7 Units Subcutaneous TID AC   insulin lispro 8 Units Subcutaneous TID With Meals   isosorbide mononitrate 30 mg Oral Q24H   levothyroxine 50 mcg Oral Daily   losartan 50 mg Oral Q24H   pregabalin 150 mg Oral BID   sodium chloride 10 mL Intravenous Q12H   sodium chloride 10 mL Intravenous Q12H     Continuous Infusions:   PRN Meds:.•  acetaminophen **OR** acetaminophen **OR** acetaminophen  •  acetaminophen  •  cyclobenzaprine  •  dextrose  •  dextrose  •  docusate sodium  •  glucagon (human recombinant)  •  HYDROcodone-acetaminophen  •  ipratropium-albuterol  •  LORazepam  •  Morphine  •  ondansetron **OR** ondansetron  •  sodium chloride  •  sodium chloride  •  trolamine salicylate    Assessment/Plan   Assessment & Plan          Active Hospital Problems    Diagnosis  POA   • **Left-sided weakness [R53.1]  Yes   • Acute pulmonary embolism without acute cor pulmonale (CMS/[facility]) [I26.99]  Yes   • History of CVA (cerebrovascular accident) [Z86.73]  Not Applicable   • Hyperlipidemia LDL goal <70 [E78.5]  Yes   • CKD (chronic kidney disease) stage 3, GFR 30-59 ml/min (CMS/[facility]) [N18.3]  Yes   • Demand ischemia (CMS/[facility]) [I24.8]  Yes   • Essential hypertension [I10]  Yes   • Coronary artery disease involving native coronary artery of native heart without angina pectoris [I25.10]  Yes   • Chronic heart failure (CMS/[facility]) [I50.9]  Yes   • Uncontrolled type 2 diabetes mellitus (CMS/[facility]) [E11.65]  Yes   • Acute UTI (urinary tract infection) [N39.0]  Yes   • Acute DVT (deep venous thrombosis) (CMS/[facility]) [I82.409]  Yes   • Acute hypoxemic respiratory failure (CMS/[facility]) [J96.01]  Yes      Resolved Hospital Problems    Diagnosis Date Resolved POA   • NSTEMI, initial episode of care (CMS/[facility]) [I21.4] 07/06/2020 Yes        Brief Hospital Course to date:   Steffen Zeng is a 79 y.o. female with past medical history of CKD stage III, hyperlipidemia type 2 diabetes, hypertension.  Patient presented with left-sided weakness, initially concerning for CVA, however venous duplex did reveal acute left lower extremity DVT.  She is currently on Eliquis.     Plan  Acute left lower extremity DVT and PE  CTA chest shows nonobstructing thrombus in right mid and lower pulmonary arteries suggesting PE, no evidence of RV strain  -continue Eliquis     Left-sided weakness, ?TIA  -Suspect this was secondary to above, not CVA , however CT perfusion was positive for ischemic changes in the left occipital lobe, CTA head and neck were unrevealing, MRI head did show small focus of encephalomalacia no evidence of acute infarction.  -Cards rec d/c ASA as she is now on Eliquis, discussed with Dr. Chung, he would however like to continue ASA  -Neurology has signed off, recommend PCP  follow-up in 2 to 4 weeks post discharge, continue statin     CKD stage III-renal function seems to be at baseline, continue to monitor, avoid nephrotoxins.     Hypertension-BP currently stable, continue, currently on Coreg and Imdur, low-dose losartan, will continue for now adjust as appropriate     Heart failure with preserved EF  -Currently compensated, continue beta-blocker     Poorly controlled type 2 diabetes with A1c 8.2%  -FSBG's have been reviewed and currently suboptimal but improving  -Increase basal Levemir to 15 units bid, increase prandial Humalog to 8 units 3 times daily, continue SSI     Suspected UTI-urine cultures NGTD, s/p 3 days of Rocephin.     Hypothyroidism-continue Synthroid     Neuropathy-previously on Tegretol, still discontinued moving forward, some interaction with Eliquis     Right lower extremity, left lateral ankle wounds-WOC following     Chronic severe back pain/spinal stenosis     Anxiety/depression-continue Cymbalta     DVT Prophylaxis: Eliquis     Disposition: TBD      COVID-19 Status Testing      Asymptomatic COVID testing has been performed on Steffen Zeng to determine status per current protocols in preparation for discharge disposition.     The patient does not have concerning clinical findings or high risk screen for COVID and this diagnosis is not clinically suspected.  However, due to prevalence of asymptomatic COVID infection testing was warranted to facilitate appropriate care.        COVID result:    COVID19   Date Value Ref Range Status   07/05/2020 Not Detected Not Detected - Ref. Range Final              CODE STATUS:   Code Status and Medical Interventions:   Ordered at: 07/05/20 0205     Limited Support to NOT Include:    Cardioversion/Defibrillation    Intubation     Level Of Support Discussed With:    Next of Kin (If No Surrogate)     Code Status:    No CPR     Medical Interventions (Level of Support Prior to Arrest):    Limited     Comments:    Discussed with  daughter at length, patient never wished to be on the ventilator or wanted CPR.       Electronically signed by Berna Mims MD, 07/09/20, 09:19.

## 2020-07-09 NOTE — PLAN OF CARE
Problem: Patient Care Overview  Goal: Plan of Care Review  7/9/2020 1427 by Bia Schreiber PT  Outcome: Ongoing (interventions implemented as appropriate)  Flowsheets  Taken 7/8/2020 1906 by Savanna Peña RN  Progress: improving  Taken 7/9/2020 1421 by Bia Schreiber PT  Plan of Care Reviewed With: patient  Outcome Summary: PT GIVES GOOD EFFORT AND FOLLOWS ALL COMMANDS. NEEDS CUES FOR SAFETY WITH TRANSFERS. LIMITED BY DECREASED ENDURANCE AND PAIN. PERFORMED STS X 5 REPS WITH CGA TO MIN ASSIST AND LE SITTING THER EX. RECOMMEND SNF AT D/C FOR RETURN TO PLOF BEFORE HOME ALONE.

## 2020-07-10 VITALS
DIASTOLIC BLOOD PRESSURE: 61 MMHG | BODY MASS INDEX: 42.88 KG/M2 | HEIGHT: 63 IN | TEMPERATURE: 97.3 F | OXYGEN SATURATION: 97 % | WEIGHT: 242 LBS | HEART RATE: 63 BPM | SYSTOLIC BLOOD PRESSURE: 143 MMHG | RESPIRATION RATE: 16 BRPM

## 2020-07-10 LAB
GLUCOSE BLDC GLUCOMTR-MCNC: 248 MG/DL (ref 70–130)
GLUCOSE BLDC GLUCOMTR-MCNC: 289 MG/DL (ref 70–130)

## 2020-07-10 PROCEDURE — 82962 GLUCOSE BLOOD TEST: CPT

## 2020-07-10 PROCEDURE — 63710000001 INSULIN LISPRO (HUMAN) PER 5 UNITS: Performed by: INTERNAL MEDICINE

## 2020-07-10 PROCEDURE — 99239 HOSP IP/OBS DSCHRG MGMT >30: CPT | Performed by: INTERNAL MEDICINE

## 2020-07-10 PROCEDURE — 63710000001 INSULIN DETEMIR PER 5 UNITS: Performed by: INTERNAL MEDICINE

## 2020-07-10 RX ORDER — ISOSORBIDE MONONITRATE 30 MG/1
30 TABLET, EXTENDED RELEASE ORAL
Qty: 30 TABLET | Refills: 1 | Status: SHIPPED | OUTPATIENT
Start: 2020-07-11 | End: 2020-08-10

## 2020-07-10 RX ORDER — ATORVASTATIN CALCIUM 80 MG/1
80 TABLET, FILM COATED ORAL NIGHTLY
Qty: 30 TABLET | Refills: 1 | Status: SHIPPED | OUTPATIENT
Start: 2020-07-10 | End: 2020-08-09

## 2020-07-10 RX ORDER — LORAZEPAM 1 MG/1
1 TABLET ORAL NIGHTLY PRN
Qty: 3 TABLET | Refills: 0 | Status: SHIPPED | OUTPATIENT
Start: 2020-07-10 | End: 2020-07-13

## 2020-07-10 RX ORDER — HYDROCODONE BITARTRATE AND ACETAMINOPHEN 5; 325 MG/1; MG/1
1 TABLET ORAL EVERY 6 HOURS PRN
Qty: 12 TABLET | Refills: 0 | Status: SHIPPED | OUTPATIENT
Start: 2020-07-10 | End: 2020-07-13

## 2020-07-10 RX ORDER — PREGABALIN 150 MG/1
150 CAPSULE ORAL 2 TIMES DAILY
Qty: 6 CAPSULE | Refills: 0 | Status: SHIPPED | OUTPATIENT
Start: 2020-07-10 | End: 2020-07-13

## 2020-07-10 RX ORDER — LOSARTAN POTASSIUM 50 MG/1
50 TABLET ORAL DAILY
Start: 2020-07-10

## 2020-07-10 RX ADMIN — INSULIN LISPRO 3 UNITS: 100 INJECTION, SOLUTION INTRAVENOUS; SUBCUTANEOUS at 08:55

## 2020-07-10 RX ADMIN — INSULIN LISPRO 4 UNITS: 100 INJECTION, SOLUTION INTRAVENOUS; SUBCUTANEOUS at 12:01

## 2020-07-10 RX ADMIN — FLUTICASONE PROPIONATE 2 SPRAY: 50 SPRAY, METERED NASAL at 08:58

## 2020-07-10 RX ADMIN — CASTOR OIL AND BALSAM, PERU 1 EACH: 788; 87 OINTMENT TOPICAL at 08:56

## 2020-07-10 RX ADMIN — INSULIN LISPRO 8 UNITS: 100 INJECTION, SOLUTION INTRAVENOUS; SUBCUTANEOUS at 08:57

## 2020-07-10 RX ADMIN — CYCLOBENZAPRINE HYDROCHLORIDE 10 MG: 10 TABLET, FILM COATED ORAL at 08:56

## 2020-07-10 RX ADMIN — FERROUS SULFATE TAB 325 MG (65 MG ELEMENTAL FE) 325 MG: 325 (65 FE) TAB at 08:55

## 2020-07-10 RX ADMIN — CARVEDILOL 12.5 MG: 12.5 TABLET, FILM COATED ORAL at 08:55

## 2020-07-10 RX ADMIN — LOSARTAN POTASSIUM 50 MG: 50 TABLET, FILM COATED ORAL at 08:55

## 2020-07-10 RX ADMIN — INSULIN DETEMIR 15 UNITS: 100 INJECTION, SOLUTION SUBCUTANEOUS at 08:58

## 2020-07-10 RX ADMIN — ISOSORBIDE MONONITRATE 30 MG: 30 TABLET, EXTENDED RELEASE ORAL at 08:55

## 2020-07-10 RX ADMIN — ASPIRIN 81 MG: 81 TABLET, COATED ORAL at 08:55

## 2020-07-10 RX ADMIN — LEVOTHYROXINE SODIUM 50 MCG: 50 TABLET ORAL at 06:30

## 2020-07-10 RX ADMIN — SODIUM CHLORIDE, PRESERVATIVE FREE 10 ML: 5 INJECTION INTRAVENOUS at 08:58

## 2020-07-10 RX ADMIN — INSULIN LISPRO 8 UNITS: 100 INJECTION, SOLUTION INTRAVENOUS; SUBCUTANEOUS at 12:01

## 2020-07-10 RX ADMIN — PREGABALIN 150 MG: 75 CAPSULE ORAL at 08:56

## 2020-07-10 RX ADMIN — APIXABAN 5 MG: 5 TABLET, FILM COATED ORAL at 06:30

## 2020-07-10 RX ADMIN — FAMOTIDINE 40 MG: 20 TABLET, FILM COATED ORAL at 08:56

## 2020-07-10 RX ADMIN — HYDROCODONE BITARTRATE AND ACETAMINOPHEN 1 TABLET: 5; 325 TABLET ORAL at 06:30

## 2020-07-10 RX ADMIN — SODIUM CHLORIDE, PRESERVATIVE FREE 10 ML: 5 INJECTION INTRAVENOUS at 08:56

## 2020-07-10 NOTE — DISCHARGE PLACEMENT REQUEST
"Steffen Zeng (79 y.o. Female)     Date of Birth Social Security Number Address Home Phone MRN    1940  [address] [place] [address] [phone] [id number]    Congregation Marital Status          Druze        Admission Date Admission Type Admitting Provider Attending Provider Department, Room/Bed    7/4/20 Urgent Berna Mims MD Opii, [place], MD [facility] 3F, S314/1    Discharge Date Discharge Disposition Discharge Destination         Skilled Nursing Facility (DC - External)              Attending Provider:  Berna Mims MD    Allergies:  No Known Allergies    Isolation:  None   Infection:  None   Code Status:  No CPR    Ht:  160 cm (63\")   Wt:  110 kg (242 lb)    Admission Cmt:  None   Principal Problem:  Left-sided weakness [R53.1] More...                 Active Insurance as of 7/4/2020     Primary Coverage     Payor Plan Insurance Group Employer/Plan Group    MEDICARE MEDICARE A & B      Payor Plan Address Payor Plan Phone Number Payor Plan Fax Number Effective Dates    [address] [phone]  10/1/2004 - None Entered    [facility] [id number]       Subscriber Name Subscriber Birth Date Member ID       STEFFEN ZENG 1940 [id number]           Secondary Coverage     Payor Plan Insurance Group Employer/Plan Group    [id number]     Payor Plan Address Payor Plan Phone Number Payor Plan Fax Number Effective Dates    [address]   12/1/2016 - None Entered    [facility] [id number]       Subscriber Name Subscriber Birth Date Member ID       STEFFEN ZENG 1940 [id number]                 Emergency Contacts      (Rel.) Home Phone Work Phone Mobile Phone    Cher Velázquez (Daughter) [phone] -- --    Payal Luu (Sister) [phone] -- --               Discharge Summary      Berna Mims MD at 07/10/20 0904              [facility] Medicine Services  DISCHARGE SUMMARY    Patient Name: Steffen" Karri  : 1940  MRN: [id number]    Date of Admission: 2020 11:33 PM  Date of Discharge: 7/10/2020  Primary Care Physician: Provider, No Known    Consults     Date and Time Order Name Status Description    2020 1135 Inpatient Cardiology Consult Completed     2020 0205 Inpatient Neurology Consult Stroke Completed           Hospital Course     Presenting Problem:   Aspiration pneumonia of both lungs (CMS/HCC) [J69.0]  Acute hypoxemic respiratory failure (CMS/HCC) [J96.01]    Active Hospital Problems    Diagnosis  POA   • **Left-sided weakness [R53.1]  Yes   • Acute pulmonary embolism without acute cor pulmonale (CMS/[facility]) [I26.99]  Yes   • History of CVA (cerebrovascular accident) [Z86.73]  Not Applicable   • Hyperlipidemia LDL goal <70 [E78.5]  Yes   • CKD (chronic kidney disease) stage 3, GFR 30-59 ml/min (CMS/[facility]) [N18.3]  Yes   • Demand ischemia (CMS/HCC) [I24.8]  Yes   • Essential hypertension [I10]  Yes   • Coronary artery disease involving native coronary artery of native heart without angina pectoris [I25.10]  Yes   • Chronic heart failure (CMS/[facility]) [I50.9]  Yes   • Uncontrolled type 2 diabetes mellitus (CMS/[facility]) [E11.65]  Yes   • Acute UTI (urinary tract infection) [N39.0]  Yes   • Acute DVT (deep venous thrombosis) (CMS/[facility]) [I82.409]  Yes   • Acute hypoxemic respiratory failure (CMS/[facility]) [J96.01]  Yes      Resolved Hospital Problems    Diagnosis Date Resolved POA   • NSTEMI, initial episode of care (CMS/[facility]) [I21.4] 2020 Yes      Hospital Course:  Steffen Zeng is a 79 y.o. female with past medical history of CKD stage III, hyperlipidemia type 2 diabetes, hypertension.  Patient presented with left-sided weakness, initially concerning for CVA, however venous duplex did reveal acute left lower extremity DVT.  She is currently on Eliquis.     Acute left lower extremity DVT and PE  CTA chest shows nonobstructing thrombus in right mid and lower pulmonary arteries suggesting PE, no  evidence of RV strain, continue Eliquis     Left-sided weakness, ?TIA  -Suspect this was secondary to above, not CVA , however CT perfusion was positive for ischemic changes in the left occipital lobe, CTA head and neck were unrevealing, MRI head did show small focus of encephalomalacia no evidence of acute infarction.  -Neurology and cards were consulted recommend to continue aspirin in addition to ongoing anticoagulation.    -She will follow-up with PCP in 2 weeks post discharge, continue statin     CKD stage III-renal function seems to be at baseline, continue to monitor, avoid nephrotoxins.     Hypertension-BP currently stable, continue, currently on Coreg, decreased Imdur dose, low-dose losartan     Heart failure with preserved EF  -Currently compensated, continue beta-blocker  -f/u with cardiology in 4 weeks     Poorly controlled type 2 diabetes with A1c 8.2%  -FSBG's were reviewed and were suboptimal but improving  -She was on Levemir 15 units bid, prandial Humalog to 8 units 3 times daily and SSI.  She is okay to resume home regimen at discharge     Suspected UTI-urine cultures NGTD, s/p 3 days of Rocephin.     Hypothyroidism-continue Synthroid     Neuropathy-previously on Tegretol, we will discontinue it moving forward sec to its significant interaction with Eliquis, continue lyrica at reduced dose of 150 mg BID     Right lower extremity, left lateral ankle wounds-WOC following     Chronic severe back pain/spinal stenosis     Anxiety/depression-continue Cymbalta        COVID-19 Status Testing      Asymptomatic COVID testing has been performed on Steffen Zeng to determine status per current protocols in preparation for planned procedure and/or discharge disposition.     The patient does not have concerning clinical findings or high risk screen for COVID and this diagnosis is not clinically suspected.  However, due to prevalence of asymptomatic COVID infection testing was warranted to facilitate appropriate  care.        COVID result:    COVID19   Date Value Ref Range Status   07/09/2020 Not Detected Not Detected - Ref. Range Final                Discharge Follow Up Recommendations for outpatient labs/diagnostics:  Follow-up with PCP in 2 weeks  Follow-up with cardiology in 4 weeks    Day of Discharge     HPI: No acute events overnight,patient states that she slept well,has no new complains.    Review of Systems  Gen- No fevers, chills  CV- No chest pain, palpitations  Resp- No cough, dyspnea  GI- No N/V/D, abd pain    All systems reviewed and currently negative except as mentioned in HPI above    Vital Signs:   Temp:  [97.3 °F (36.3 °C)-98.1 °F (36.7 °C)] 97.3 °F (36.3 °C)  Heart Rate:  [63-76] 63  Resp:  [16-18] 16  BP: (101-149)/(48-67) 143/61     Physical Exam:  Constitutional: Elderly lady, in no acute distress, awake, alert  HENT: NCAT, mucous membranes moist  Respiratory: Clear to auscultation bilaterally, respiratory effort normal   Cardiovascular: RRR, no murmurs, rubs, or gallops, palpable pedal pulses bilaterally  Gastrointestinal: Positive bowel sounds, soft, nontender, nondistended  Musculoskeletal: No bilateral ankle edema  Psychiatric: Appropriate affect, cooperative  Neurologic: Oriented x 3, no focal deficits  Skin: No rashes    Pertinent  and/or Most Recent Results     Results from last 7 days   Lab Units 07/08/20  0709 07/06/20  0903 07/05/20  0513 07/05/20  0055   WBC 10*3/mm3 8.32 9.84 12.87* 15.43*   HEMOGLOBIN g/dL 9.5* 12.1 11.4* 12.3   HEMATOCRIT % 29.5* 36.6 35.4 37.7   PLATELETS 10*3/mm3 155 153 138* 144   SODIUM mmol/L 137 139 138 138   POTASSIUM mmol/L 3.5 3.5 3.2* 3.5   CHLORIDE mmol/L 101 102 101 99   CO2 mmol/L 26.0 25.0 23.0 25.0   BUN mg/dL 19 17 22 23   CREATININE mg/dL 1.02* 0.96 1.10* 1.24*   GLUCOSE mg/dL 259* 294* 284* 334*   CALCIUM mg/dL 9.6 9.1 8.8 8.8     Results from last 7 days   Lab Units 07/05/20  1045 07/05/20  0513 07/05/20  0055   BILIRUBIN mg/dL  --  0.3 0.3   ALK PHOS  U/L  --  75 81   ALT (SGPT) U/L  --  35* 37*   AST (SGOT) U/L  --  60* 61*   PROTIME Seconds 14.1*  --  14.5*   INR  1.12  --  1.16   APTT seconds 31.1*  --  69.7     Results from last 7 days   Lab Units 07/05/20  0055   CHOLESTEROL mg/dL 312*   TRIGLYCERIDES mg/dL 176*   HDL CHOL mg/dL 44     Results from last 7 days   Lab Units 07/05/20  0513 07/05/20  0055   TSH uIU/mL 0.640  --    HEMOGLOBIN A1C %  --  8.20*  8.10*   TROPONIN T ng/mL 0.411* 0.447*   PROCALCITONIN ng/mL  --  7.52*   LACTATE mmol/L 2.4* 2.4*       Brief Urine Lab Results  (Last result in the past 365 days)      Color   Clarity   Blood   Leuk Est   Nitrite   Protein   CREAT   Urine HCG        07/05/20 0723 Yellow Clear Moderate (2+) Moderate (2+) Positive Trace               Microbiology Results Abnormal     Procedure Component Value - Date/Time    COVID PRE-OP / PRE-PROCEDURE SCREENING ORDER (NO ISOLATION) - Swab, Nasopharynx [[id number]] Collected:  07/09/20 1010    Lab Status:  Final result Specimen:  Swab from Nasopharynx Updated:  07/09/20 1453    Narrative:       The following orders were created for panel order COVID PRE-OP / PRE-PROCEDURE SCREENING ORDER (NO ISOLATION) - Swab, Nasopharynx.  Procedure                               Abnormality         Status                     ---------                               -----------         ------                     COVID-19,[facility] IN-HOUSE...[[id number]]  Normal              Final result                 Please view results for these tests on the individual orders.    COVID-19,[facility] IN-HOUSE, NP SWAB IN TRANSPORT MEDIA 8-12 HR TAT - Swab, Nasopharynx [[id number]]  (Normal) Collected:  07/09/20 1010    Lab Status:  Final result Specimen:  Swab from Nasopharynx Updated:  07/09/20 1453     COVID19 Not Detected    Narrative:       Fact sheet for providers: https://www.fda.gov/media/282928/download     Fact sheet for patients: https://www.fda.gov/media/605715/download    Urine Culture - Urine, Urine,  Clean Catch [[id number]]  (Normal) Collected:  07/05/20 0723    Lab Status:  Final result Specimen:  Urine, Clean Catch Updated:  07/06/20 1030     Urine Culture No growth    COVID PRE-OP / PRE-PROCEDURE SCREENING ORDER (NO ISOLATION) - Swab, Nasopharynx [[id number]] Collected:  07/05/20 0137    Lab Status:  Final result Specimen:  Swab from Nasopharynx Updated:  07/05/20 0249    Narrative:       The following orders were created for panel order COVID PRE-OP / PRE-PROCEDURE SCREENING ORDER (NO ISOLATION) - Swab, Nasopharynx.  Procedure                               Abnormality         Status                     ---------                               -----------         ------                     COVID-19,CEPHEID,MYA IN-...[[id number]]  Normal              Final result                 Please view results for these tests on the individual orders.    COVID-19,CEPHEID,MYA IN-HOUSE(OR EMERGENT/ADD-ON),NP SWAB IN TRANSPORT MEDIA 3-4 HR TAT - Swab, Nasopharynx [[id number]]  (Normal) Collected:  07/05/20 0137    Lab Status:  Final result Specimen:  Swab from Nasopharynx Updated:  07/05/20 0249     COVID19 Not Detected    Narrative:       Fact sheet for providers: https://www.fda.gov/media/795404/download     Fact sheet for patients: https://www.fda.gov/media/106659/download          Imaging Results (All)     Procedure Component Value Units Date/Time    CT Angiogram Chest With & Without Contrast [[id number]] Collected:  07/06/20 1451     Updated:  07/07/20 1053    Narrative:       EXAMINATION: CT ANGIOGRAM CHEST W WO CONTRAST-07/06/2020:      INDICATION: DVT; Z74.09-Other reduced mobility; Z78.9-Other specified  health status, chest pain, hypoxia.     TECHNIQUE: Multiple axial CT imaging was obtained of the chest with and  without the administration of intravenous contrast.     The radiation dose reduction device was turned on for each scan per the  ALARA (As Low as Reasonably Achievable) protocol.     COMPARISON: NONE.        FINDINGS: The thyroid is enlarged and heterogeneous in appearance. Small  lymph nodes seen scattered throughout the mediastinum. Filling defects  seen in the right mid and lower lobe pulmonary arteries suggesting  thrombus and pulmonary emboli. The pulmonary emboli are incompletely  obstructing. RV-LV ratio is within normal limits. There are  bronchiectatic changes and wall thickening identified of the bronchioles  in the left lower lobe. There are mild increased markings suggesting  atelectatic change in the lung bases bilaterally. No dense  consolidation. No definite pleural effusion. Degenerative changes seen  within the spine. The visualized upper abdomen is unremarkable.       Impression:       Nonobstructing thrombus within the right mid and lower lobe  pulmonary arteries suggesting pulmonary emboli. Atelectatic changes seen  in the lung bases with bronchial wall thickening seen in the left lower  lobe bronchioles. The RV-LV ratio is within normal limits.     D:  07/06/2020  E:  07/06/2020           This report was finalized on 7/7/2020 10:50 AM by Dr. Karen Bermudez MD.       MRI Brain Without Contrast [[id number]] Collected:  07/05/20 1346     Updated:  07/05/20 2251    Narrative:       EXAMINATION: MRI BRAIN WO CONTRAST - 07/05/2020     INDICATION: Left-sided weakness. Evaluate for stroke.     TECHNIQUE: Sagittal and axial T1 and axial T2 FLAIR and  diffusion-weighted images of the brain, axial T2 Flash images.     COMPARISON: Head CT scan and cerebral perfusion exam of 07/04/2020     FINDINGS: Perfusion study report indicates approximately 10 mL volume  area of ischemia in the left occipital lobe by rapid analysis. Today's  study shows no evidence of restricted diffusion to suggest acute  infarction the left occipital lobe or elsewhere. Remaining sequences  show a small focus of encephalomalacia in the medial left temporal lobe  and perhaps trace amount of gliosis in the same location of the  right  occipital lobe. There is age-appropriate generalized cerebral atrophy  elsewhere. There is relatively mild nonconfluent central white matter  disease. There are no signal changes suggestive of hemorrhage, no  evidence of mass, mass effect, hydrocephalus, or abnormal extra-axial  collection. Sagittal images show the midline structures to appear  grossly normal. Anatomy of the craniocervical junction appears normal.  There is expected flow signal in the major intracranial arteries and in  the median sagittal sinus. There appears to be chronic opacification  left maxillary sinus. Remaining paranasal sinuses appear grossly clear.       Impression:       1. Small focus of encephalomalacia in the left occipital lobe.. No  evidence of acute infarction here or elsewhere.  2. Chronic-appearing changes of the aging brain. No evidence of acute  intracranial disease.     DICTATED:   07/05/2020  EDITED/ls :   07/05/2020      This report was finalized on 7/5/2020 10:48 PM by Dr. Matt Malave MD.       CT Angiogram Neck [[id number]] Collected:  07/05/20 0017     Updated:  07/05/20 0019    Narrative:       CTA Neck, CTA Head    INDICATION:   Decreased responsiveness. Abnormal perfusion study showing ischemic change in the left occipital lobe    TECHNIQUE:   CT angiogram of the head and neck with IV contrast. 3-D reconstructions were obtained and reviewed.  Evaluation for a significant carotid arterial stenosis is based on the NASCET criteria. Radiation dose reduction techniques included automated exposure  control or exposure modulation based on body size. Count of known CT and cardiac nuc med studies performed in previous 12 months: 0.     COMPARISON:   CT brain and CT perfusion study of the brain from today    FINDINGS:   CTA neck:  Lung apices clear. Thyroid gland is enlarged and has a low-density lesion in the right lobe measuring 2.6 cm in diameter. Submandibular and parotid glands are normal.    Vascular findings aortic  arch is normal in size. Great vessels are all patent. The study slightly degraded by motion. It is difficult to see either vertebral artery in the lower neck. There is reconstitution of the more distal vertebral arteries and the  left one is dominant. 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 There are calcified plaques in each carotid bifurcation region without significant stenosis. The rest of the internal carotid arteries are patent.    CTA head:  The middle and anterior tissue arteries appear normal.. A right posterior cerebral artery appears be present but is a lot of venous opacification. Its difficult to clearly see the left posterior cerebral artery.      Impression:       Study is somewhat limited. There is poor evaluation of both posterior cerebral arteries. Probably the right one is present but is difficult see the proximal left PCA. It is also Also difficult see the proximal vertebral arteries in the neck.    No significant carotid stenosis is identified.    Signer Name: Franklin Bella MD   Signed: 7/5/2020 12:17 AM   Workstation [facility]: [facility]    [facility]    CT Angiogram Head [[id number]] Collected:  07/05/20 0017     Updated:  07/05/20 0019    Narrative:       CTA Neck, CTA Head    INDICATION:   Decreased responsiveness. Abnormal perfusion study showing ischemic change in the left occipital lobe    TECHNIQUE:   CT angiogram of the head and neck with IV contrast. 3-D reconstructions were obtained and reviewed.  Evaluation for a significant carotid arterial stenosis is based on the NASCET criteria. Radiation dose reduction techniques included automated exposure  control or exposure modulation based on body size. Count of known CT and cardiac nuc med studies performed in previous 12 months: 0.     COMPARISON:   CT brain and CT perfusion study of the brain from today    FINDINGS:   CTA neck:  Lung apices clear. Thyroid gland is enlarged and has a low-density lesion in the right  lobe measuring 2.6 cm in diameter. Submandibular and parotid glands are normal.    Vascular findings aortic arch is normal in size. Great vessels are all patent. The study slightly degraded by motion. It is difficult to see either vertebral artery in the lower neck. There is reconstitution of the more distal vertebral arteries and the  left one is dominant. 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 There are calcified plaques in each carotid bifurcation region without significant stenosis. The rest of the internal carotid arteries are patent.    CTA head:  The middle and anterior tissue arteries appear normal.. A right posterior cerebral artery appears be present but is a lot of venous opacification. Its difficult to clearly see the left posterior cerebral artery.      Impression:       Study is somewhat limited. There is poor evaluation of both posterior cerebral arteries. Probably the right one is present but is difficult see the proximal left PCA. It is also Also difficult see the proximal vertebral arteries in the neck.    No significant carotid stenosis is identified.    Signer Name: Franklin Bella MD   Signed: 7/5/2020 12:17 AM   Workstation Name: [facility]    [facility]    CT Cerebral Perfusion With & Without Contrast [[id number]] Collected:  07/05/20 0004     Updated:  07/05/20 0006    Narrative:       CT CEREBRAL PERFUSION W WO CONTRAST    HISTORY:       TECHNIQUE:   Axial CT images of the brain without and with intravenous contrast using cerebral perfusion protocol. Post-processing parametric maps were created using RAPID software and reviewed. Radiation dose reduction techniques included automated exposure control  or exposure modulation based on body size. CT and nuclear cardiology exams in the last 12 months: 0.    COMPARISON:   CT scan earlier today    FINDINGS:   Arterial input function is optimal.     Ischemic tissue volume (Tmax > 6 seconds, includes core and penumbra): 10 cc  in the left occipital lobe  Ischemic core volume (rCBF < 30%): 0  Mismatch (penumbra) volume 10 cc, ratio and then it  Volume of poor collateral perfusion (Tmax > 10 seconds): 0  Hypoperfusion index (Tmax > 10 seconds/Tmax > 6 seconds): 0  CBV index: 0.1      Impression:       There is a 10 cc area of ischemic change in the left occipital lobe without visualized core infarct.    I have given the results to the patient's nurse (Karly) by telephone at 12 4:00 AM          Signer Name: Franklin Bella MD   Signed: 7/5/2020 12:04 AM   Workstation Name: [facility]    Radiology [facility]    CT Abdomen Pelvis Without Contrast [[id number]] Collected:  07/04/20 2356     Updated:  07/04/20 2358    Narrative:       CT Abdomen Pelvis WO    INDICATION:   Abdominal pain. Decreased responsiveness    TECHNIQUE:   CT of the abdomen and pelvis without IV contrast. Coronal and sagittal reconstructions were obtained.  Radiation dose reduction techniques included automated exposure control or exposure modulation based on body size. Count of known CT and cardiac nuc  med studies performed in previous 12 months: 0.     COMPARISON:   None available.    FINDINGS:  Abdomen: The gallbladder is been removed. The liver, spleen, pancreas, adrenal glands and kidneys are normal in appearance. Aorta is normal in size. There is no adenopathy. Bowel is normal except for a few sigmoid diverticuli.    Pelvis: Uterus is been removed. There are no adnexal masses. The bladder is normal except for focal area of benign-appearing calcification so she with posterior wall. This is 12 mm in diameter.      Impression:       Negative CT of the abdomen and pelvis.          Signer Name: Franklin Bella MD   Signed: 7/4/2020 11:56 PM   Workstation Name: [facility]    Radiology [facility]    CT Chest Without Contrast [[id number]] Collected:  07/04/20 2353     Updated:  07/04/20 2355    Narrative:       CT Chest WO    INDICATION:   Shortness of  air tonight    TECHNIQUE:   CT of the thorax without IV contrast. Coronal and sagittal reconstructions were obtained.  Radiation dose reduction techniques included automated exposure control or exposure modulation based on body size. Count of known CT and cardiac nuc med studies  performed in previous 12 months: 0.     COMPARISON:   None available.    FINDINGS:  There is minimal basilar atelectasis otherwise the lungs are clear. The airways are patent. The thyroid tissue is enlarged bilaterally. The aorta is normal in size. There is no adenopathy. Bones are unremarkable.      Impression:       Minimal bibasilar atelectasis. Otherwise negative CT chest    Signer Name: Franklin Bella MD   Signed: 7/4/2020 11:53 PM   Workstation Name: [employer]    Radiology [facility]    CT Head Without Contrast Stroke Protocol [[id number]] Collected:  07/04/20 2350     Updated:  07/04/20 2352    Narrative:       CT Head WO Code Stroke    HISTORY:   Decreased responsiveness tonight    TECHNIQUE:   Axial unenhanced head CT. Radiation dose reduction techniques included automated exposure control or exposure modulation based on body size. Count of known CT and cardiac nuc med studies performed in previous 12 months: 0.     Time of scan: 11:40 PM    COMPARISON:   None.    FINDINGS:   No intracranial hemorrhage, mass, or infarct. No hydrocephalus or extra-axial fluid collection. Brain parenchymal density is normal. The skull base, calvarium, and extracranial soft tissues are normal.      Impression:       Normal, negative unenhanced head CT.          Signer Name: Franklin Bella MD   Signed: 7/4/2020 11:50 PM   Workstation Name: [employer]    Radiology [facility]          Results for orders placed during the hospital encounter of 07/04/20   Duplex Venous Lower Extremity - Bilateral CAR    Narrative · Normal right lower extremity venous duplex scan.  · Acute left lower extremity deep vein thrombosis noted in the common    femoral, proximal femoral, mid femoral, distal femoral, popliteal,   posterial tibial, peroneal and gastrocnemius.          Results for orders placed during the hospital encounter of 07/04/20   Duplex Venous Lower Extremity - Bilateral CAR    Narrative · Normal right lower extremity venous duplex scan.  · Acute left lower extremity deep vein thrombosis noted in the common   femoral, proximal femoral, mid femoral, distal femoral, popliteal,   posterial tibial, peroneal and gastrocnemius.          Results for orders placed during the hospital encounter of 07/04/20   Adult Transthoracic Echo Complete W/ Cont if Necessary Per Protocol    Narrative · Cardiac chamber sizes and wall thicknesses are normal. All wall   thicknesses are normal.  · Left ventricular systolic function appears to be at the lower limits of   normal to slightly depressed with an estimated ejection fraction of 51% -   55%.  · The trileaflet aortic valve has some degree of calcific changes.   Significant aortic stenosis/aortic insufficiency is not seen.  · The mitral valve is normal in structure and function.  · There is moderate tricuspid regurgitation with an estimated right   ventricular systolic pressure of 43 mmHg, sugesting moderately severe PA   hypertension.  · An agitated saline study reveals no evidence of intracardiac shunting.  · There are no other important findings on this study.          Plan for Follow-up of Pending Labs/Results: None    Discharge Details        Discharge Medications      New Medications      Instructions Start Date   apixaban 5 MG tablet tablet  Commonly known as:  ELIQUIS   5 mg, Oral, Every 12 Hours      atorvastatin 80 MG tablet  Commonly known as:  LIPITOR   80 mg, Oral, Nightly         Changes to Medications      Instructions Start Date   HYDROcodone-acetaminophen  MG per tablet  Commonly known as:  NORCO  What changed:  Another medication with the same name was added. Make sure you understand how and  when to take each.   1 tablet, Oral, 3 Times Daily      HYDROcodone-acetaminophen 5-325 MG per tablet  Commonly known as:  NORCO  What changed:  You were already taking a medication with the same name, and this prescription was added. Make sure you understand how and when to take each.   1 tablet, Oral, Every 6 Hours PRN      isosorbide mononitrate 30 MG 24 hr tablet  Commonly known as:  IMDUR  What changed:    · medication strength  · how much to take  · when to take this   30 mg, Oral, Every 24 Hours Scheduled   Start Date:  July 11, 2020     losartan 50 MG tablet  Commonly known as:  COZAAR  What changed:  when to take this   50 mg, Oral, Daily      pregabalin 150 MG capsule  Commonly known as:  LYRICA  What changed:    · medication strength  · how much to take   150 mg, Oral, 2 Times Daily         Continue These Medications      Instructions Start Date   amitriptyline 10 MG tablet  Commonly known as:  ELAVIL   10 mg, Oral, Nightly      aspirin 81 MG EC tablet   81 mg, Oral, Daily      carvedilol 12.5 MG tablet  Commonly known as:  COREG   12.5 mg, Oral, 2 Times Daily With Meals      Claritin 10 MG tablet  Generic drug:  loratadine   10 mg, Oral, Daily      cyclobenzaprine 10 MG tablet  Commonly known as:  FLEXERIL   10 mg, Oral, Every Night at Bedtime      diclofenac 1 % gel gel  Commonly known as:  VOLTAREN   4 g, Topical, 3 Times Daily      donepezil 10 MG tablet  Commonly known as:  ARICEPT   10 mg, Oral, Nightly      DULoxetine 60 MG capsule  Commonly known as:  CYMBALTA   60 mg, Oral, Nightly      ferrous sulfate 325 (65 FE) MG tablet   325 mg, Oral, Daily With Breakfast      fish oil 1000 MG capsule capsule   1,000 mg, Oral, Daily With Breakfast      furosemide 40 MG tablet  Commonly known as:  LASIX   40 mg, Oral, Daily      guaiFENesin 600 MG 12 hr tablet  Commonly known as:  MUCINEX   600 mg, Oral, 2 Times Daily      icosapent ethyl 1 g capsule capsule  Commonly known as:  VASCEPA   1 g, Oral, 2 Times  Daily With Meals      ipratropium-albuterol 0.5-2.5 mg/3 ml nebulizer  Commonly known as:  DUO-NEB   3 mL, Nebulization, Every 4 Hours PRN      levothyroxine 50 MCG tablet  Commonly known as:  SYNTHROID, LEVOTHROID   50 mcg, Oral, Daily      linaclotide 290 MCG capsule capsule  Commonly known as:  LINZESS   290 mcg, Oral, Every Morning Before Breakfast      LORazepam 1 MG tablet  Commonly known as:  ATIVAN   1 mg, Oral, Nightly PRN      metFORMIN 500 MG tablet  Commonly known as:  GLUCOPHAGE   500 mg, Oral, 2 Times Daily With Meals      nitroglycerin 0.4 MG SL tablet  Commonly known as:  NITROSTAT   0.4 mg, Sublingual, Every 5 Minutes PRN, Take no more than 3 doses in 15 minutes.       omeprazole 20 MG capsule  Commonly known as:  priLOSEC   20 mg, Oral, Daily      pramipexole 0.5 MG tablet  Commonly known as:  MIRAPEX   0.5 mg, Oral, Every Night at Bedtime      propranolol 40 MG tablet  Commonly known as:  INDERAL   40 mg, Oral, 2 Times Daily      saccharomyces boulardii 250 MG capsule  Commonly known as:  FLORASTOR   250 mg, Oral, Daily      SITagliptin 100 MG tablet  Commonly known as:  JANUVIA   100 mg, Oral, Daily      vitamin D 1.25 MG (63182 UT) capsule capsule  Commonly known as:  ERGOCALCIFEROL   50,000 Units, Oral, Weekly      vitamin E 400 UNIT capsule   400 Units, Oral, Daily         Stop These Medications    bumetanide 2 MG tablet  Commonly known as:  BUMEX     carBAMazepine  MG 12 hr tablet  Commonly known as:  TEGretol  XR            No Known Allergies      Discharge Disposition: Rehab      Diet:  Hospital:  Diet Order   Procedures   • Diet Regular; Cardiac, Consistent Carbohydrate       Activity: As tolerated      Restrictions or Other Recommendations:  None       CODE STATUS:    Code Status and Medical Interventions:   Ordered at: 07/05/20 020     Limited Support to NOT Include:    Cardioversion/Defibrillation    Intubation     Level Of Support Discussed With:    Next of Kin (If No Surrogate)      Code Status:    No CPR     Medical Interventions (Level of Support Prior to Arrest):    Limited     Comments:    Discussed with daughter at length, patient never wished to be on the ventilator or wanted CPR.       No future appointments.     Electronically signed by Berna Mims MD, 07/10/20, 9:33 AM.    Time Spent on Discharge:  I spent  35  minutes on this discharge activity which included: face-to-face encounter with the patient, reviewing the data in the system, coordination of the care with the nursing staff as well as consultants, documentation, and entering orders.          Electronically signed by Berna Mims MD at 07/10/20 0933

## 2020-07-10 NOTE — PROGRESS NOTES
Case Management Discharge Note      Final Note: I confirmed with Nasima in admissions that they can accept Mrs. Zeng into a skilled rehab bed today. I notified Mrs. Zeng's daughter Cher. She is going to transport her there by car this afternoon. Nurse to call report to [phone]. Once available, I will fax the discharge summary to [phone]. Hard scripts will need to be sent in the transfer packet with Mrs. Zeng.        Destination - Selection Complete      Service Provider Request Status Selected Services Address Phone Number Fax Number    [facility] Selected Assisted Living [address] [place] [id number] [phone]      Durable Medical Equipment      No service has been selected for the patient.      Dialysis/Infusion      No service has been selected for the patient.      Home Medical Care      No service has been selected for the patient.      Therapy      No service has been selected for the patient.      Community Resources      No service has been selected for the patient.             Final Discharge Disposition Code: 03 - skilled nursing facility (SNF)

## 2020-07-10 NOTE — PROGRESS NOTES
"                  Clinical Nutrition     Reason for Visit:   LOS    Patient Name: Steffen Zeng  YOB: 1940  MRN: [id number]  Date of Encounter: 07/10/20 08:27  Admission date: 7/4/2020    Nutrition Assessment   Assessment     Admission diagnosis  Acute LE DVT and PE    Additional diagnosis/conditions/procedures this admission  L-sided weakness ?TIA  Suspected UTI  Prolapsed bladder  Neuropathy   RLE, L lateral ankle wounds    Additional PMH/procedures:  HTN  CHF  MI (2016)  CAD  DM2  Recurrent UTIs  COPD  Severe back pain  Spinal stenosis  CKD3    Appy  CCY      Reported/Observed/Food/Nutrition Related History:      Patient tolerating current diet order with good oral intake. Consuming >50% of past meals.      Anthropometrics     Height: 160 cm (63\")  Last filed wt: Weight: 110 kg (242 lb) (07/06/20 0608)  Weight Method: Bed scale    BMI: BMI (Calculated): 42.9  Obese Class III extreme obesity: > or equal to 40kg/m2    Ideal Body Weight (IBW) (kg): 52.72  Admission wt: 242 lb  Method obtained: bed scale weight       Labs reviewed     Results from last 7 days   Lab Units 07/08/20  0709 07/06/20  0903 07/05/20  0513 07/05/20  0055   GLUCOSE mg/dL 259* 294* 284* 334*   BUN mg/dL 19 17 22 23   CREATININE mg/dL 1.02* 0.96 1.10* 1.24*   SODIUM mmol/L 137 139 138 138   CHLORIDE mmol/L 101 102 101 99   POTASSIUM mmol/L 3.5 3.5 3.2* 3.5   ALT (SGPT) U/L  --   --  35* 37*     Results from last 7 days   Lab Units 07/05/20  0513 07/05/20  0055   ALBUMIN g/dL 3.20* 3.40*   CHOLESTEROL mg/dL  --  312*   TRIGLYCERIDES mg/dL  --  176*       Results from last 7 days   Lab Units 07/10/20  0734 07/09/20  1959 07/09/20  1804 07/09/20  1159 07/09/20  0722 07/08/20 2007   GLUCOSE mg/dL 248* 227* 217* 237* 255* 308*     Lab Results   Lab Value Date/Time    HGBA1C 8.10 (H) 07/05/2020 0055    HGBA1C 8.20 (H) 07/05/2020 0055       Medications reviewed   Pertinent: eliquis, aricept, pepcid, iron, insulin     Intake/Output 24 " hrs (7:00AM - 6:59 AM)     Intake & Output (last day)       07/09 0701 - 07/10 0700 07/10 0701 - 07/11 0700    P.O.      Total Intake(mL/kg)      Urine (mL/kg/hr) 350 (0.1)     Stool 0     Total Output 350     Net -350           Urine Unmeasured Occurrence 3 x     Stool Unmeasured Occurrence 1 x           Current Nutrition Prescription     PO: Diet Regular; Cardiac, Consistent Carbohydrate  Intake: 69% x 4 meals    Nutrition Diagnosis     7/7 updated 7/10  Problem Altered nutrition related laboratory values   Etiology DM2   Signs/Symptoms HgbA1c = 8.2%   Status: RD provided DM education 7/7    Nutrition Intervention   1.  Follow treatment progress, Care plan reviewed  2.  Cont to monitor PO intake and adequacy    Goal:   General: Nutrition support treatment  PO: Continue positive trend  Long term goals:  HgbA1c < 7.00%      Monitoring/Evaluation:   Per protocol, PO intake, Pertinent labs, Weight, Symptoms    Will Continue to follow per protocol    Michelle Gar RDN, LD  Time Spent: 25 minutes

## 2020-07-10 NOTE — DISCHARGE PLACEMENT REQUEST
"Steffen Zeng (79 y.o. Female)     Date of Birth Social Security Number Address Home Phone MRN    1940  [address] [place] [address] [phone] [id number]    Zoroastrian Marital Status          Oriental orthodox        Admission Date Admission Type Admitting Provider Attending Provider Department, Room/Bed    7/4/20 Urgent Berna Mims MD Opii, [place], MD [facility] 3F, S314/1    Discharge Date Discharge Disposition Discharge Destination                       Attending Provider:  Berna Mims MD    Allergies:  No Known Allergies    Isolation:  None   Infection:  None   Code Status:  No CPR    Ht:  160 cm (63\")   Wt:  110 kg (242 lb)    Admission Cmt:  None   Principal Problem:  Left-sided weakness [R53.1] More...                 Active Insurance as of 7/4/2020     Primary Coverage     Payor Plan Insurance Group Employer/Plan Group    MEDICARE MEDICARE A & B      Payor Plan Address Payor Plan Phone Number Payor Plan Fax Number Effective Dates    [address] [phone]  10/1/2004 - None Entered    [facility] [id number]       Subscriber Name Subscriber Birth Date Member ID       STEFFEN ZENG 1940 [id number]           Secondary Coverage     Payor Plan Insurance Group Employer/Plan Group    [facility] SUPP [id number]     Payor Plan Address Payor Plan Phone Number Payor Plan Fax Number Effective Dates    [address]   12/1/2016 - None Entered    [facility] [id number]       Subscriber Name Subscriber Birth Date Member ID       STEFFEN ZENG 1940 [id number]                 Emergency Contacts      (Rel.) Home Phone Work Phone Mobile Phone    Cher Velázquez (Daughter) [phone] -- --    JusPayal (Sister) [phone] -- --        COVID PRE-OP / PRE-PROCEDURE SCREENING ORDER (NO ISOLATION) - Swab, Nasopharynx [[id number]] (Order [id number])   Order   Date: 7/9/2020 Department: [address] Released By/Authorizing: Berna Mims" MD (auto-released)   Linked Results     Procedure Abnormality Status   COVID-19,[facility]-HOUSE, NP SWAB IN TRANSPORT MEDIA 8-12 HR TAT - Swab, Nasopharynx Normal Final result   Reprint Order Requisition     COVID PRE-OP / PRE-PROCEDURE SCREENING ORDER (NO ISOLATION) - Swab, Nasopharynx (Order #[id number]) on 7/9/20       COVID PRE-OP / PRE-PROCEDURE SCREENING ORDER (NO ISOLATION) - Swab, Nasopharynx   Order: [id number]   Status:  Final result   Visible to patient:  No (Not Released)   Next appt:  None   Specimen Information: Nasopharynx; Swab              Specimen Collected: 07/09/20 10:10   Last Resulted: 07/09/20 14:53   Order Details View Encounter Lab and Collection Details Routing Result History            COVID-19,[facility], NP SWAB IN TRANSPORT MEDIA 8-12 HR TAT - Swab, Nasopharynx   Order: [id number] - Part of Panel Order [id number]   Status:  Final result   Visible to patient:  No (Not Released)   Next appt:  None   Specimen Information: Nasopharynx; Swab        Component  Ref Range & Units    COVID19  Not Detected - Ref. Range Not Detected    Resulting Agency  [facility]      Narrative   Performed by: [facility]   Fact sheet for providers: https://www.fda.gov/media/294174/download     Fact sheet for patients: https://www.fda.gov/media/740984/download      Specimen Collected: 07/09/20 10:10   Last Resulted: 07/09/20 14:53   Order Details View Encounter Lab and Collection Details Routing Result History            Result Read / Acknowledged      Acknowledge result   COVID-19,[facility], NP SWAB IN TRANSPORT MEDIA 8-12 HR TAT - Swab, Nasopharynx (Order [id number])     No acknowledgement history exists for this order.      Other Results from 7/4/2020      POC Glucose Once  Final result 7/10/2020    POC Glucose Once  Final result 7/9/2020    POC Glucose Once  Final result 7/9/2020    POC Glucose Once  Final result 7/9/2020    POC Glucose Once  Final result 7/9/2020    POC Glucose Once  Final result  7/8/2020    POC Glucose Once  Final result 7/8/2020    POC Glucose Once  Final result 7/8/2020    POC Glucose Once  Final result 7/8/2020    CBC (No Diff)  Final result 7/8/2020    Basic Metabolic Panel  Final result 7/8/2020    POC Glucose Once  Final result 7/7/2020    POC Glucose Once  Final result 7/7/2020    POC Glucose Once  Final result 7/7/2020    POC Glucose Once  Final result 7/7/2020    POC Glucose Once  Final result 7/6/2020    POC Glucose Once  Final result 7/6/2020    POC Glucose Once  Final result 7/6/2020    CBC (No Diff)  Final result 7/6/2020    Basic Metabolic Panel  Final result 7/6/2020   (important suggestion)  Warning: Additional results from 7/4/2020 are available but are not displayed in this report.

## 2020-07-10 NOTE — PLAN OF CARE
Problem: Patient Care Overview  Goal: Plan of Care Review  Outcome: Ongoing (interventions implemented as appropriate)  Flowsheets (Taken 7/10/2020 6754)  Plan of Care Reviewed With: patient  Note:   Patient alert and able to make needs known to staff.  VSS c/o pain with prn meds given with positive results.  Pt up in chair at this time, up to bsc this shift.  Will continue to monitor  Goal: Individualization and Mutuality  Outcome: Ongoing (interventions implemented as appropriate)  Goal: Discharge Needs Assessment  Outcome: Ongoing (interventions implemented as appropriate)  Goal: Interprofessional Rounds/Family Conf  Outcome: Ongoing (interventions implemented as appropriate)     Problem: Fall Risk (Adult)  Goal: Identify Related Risk Factors and Signs and Symptoms  Outcome: Ongoing (interventions implemented as appropriate)  Goal: Absence of Fall  Outcome: Ongoing (interventions implemented as appropriate)     Problem: Skin Injury Risk (Adult)  Goal: Identify Related Risk Factors and Signs and Symptoms  Outcome: Ongoing (interventions implemented as appropriate)  Goal: Skin Health and Integrity  Outcome: Ongoing (interventions implemented as appropriate)     Problem: Patient Care Overview  Goal: Plan of Care Review  Outcome: Ongoing (interventions implemented as appropriate)  Goal: Individualization and Mutuality  Outcome: Ongoing (interventions implemented as appropriate)  Goal: Discharge Needs Assessment  Outcome: Ongoing (interventions implemented as appropriate)  Goal: Interprofessional Rounds/Family Conf  Outcome: Ongoing (interventions implemented as appropriate)     Problem: VTE, DVT and PE (Adult)  Goal: Signs and Symptoms of Listed Potential Problems Will be Absent, Minimized or Managed (VTE, DVT and PE)  Outcome: Ongoing (interventions implemented as appropriate)